# Patient Record
Sex: FEMALE | Race: WHITE | NOT HISPANIC OR LATINO | Employment: UNEMPLOYED | URBAN - METROPOLITAN AREA
[De-identification: names, ages, dates, MRNs, and addresses within clinical notes are randomized per-mention and may not be internally consistent; named-entity substitution may affect disease eponyms.]

---

## 2020-04-02 ENCOUNTER — OFFICE VISIT (OUTPATIENT)
Dept: URGENT CARE | Facility: CLINIC | Age: 60
End: 2020-04-02
Payer: COMMERCIAL

## 2020-04-02 VITALS
TEMPERATURE: 97 F | SYSTOLIC BLOOD PRESSURE: 144 MMHG | HEIGHT: 65 IN | BODY MASS INDEX: 46.65 KG/M2 | RESPIRATION RATE: 20 BRPM | HEART RATE: 92 BPM | DIASTOLIC BLOOD PRESSURE: 73 MMHG | WEIGHT: 280 LBS | OXYGEN SATURATION: 96 %

## 2020-04-02 DIAGNOSIS — R05.9 COUGH: Primary | ICD-10-CM

## 2020-04-02 DIAGNOSIS — J30.9 ALLERGIC RHINITIS WITH POSTNASAL DRIP: ICD-10-CM

## 2020-04-02 DIAGNOSIS — R09.82 ALLERGIC RHINITIS WITH POSTNASAL DRIP: ICD-10-CM

## 2020-04-02 DIAGNOSIS — J45.21 MILD INTERMITTENT ASTHMA WITH ACUTE EXACERBATION: ICD-10-CM

## 2020-04-02 PROCEDURE — 71046 XR CHEST PA AND LATERAL: ICD-10-PCS | Mod: S$GLB,,, | Performed by: RADIOLOGY

## 2020-04-02 PROCEDURE — 99203 OFFICE O/P NEW LOW 30 MIN: CPT | Mod: S$GLB,,, | Performed by: PHYSICIAN ASSISTANT

## 2020-04-02 PROCEDURE — 99203 PR OFFICE/OUTPT VISIT, NEW, LEVL III, 30-44 MIN: ICD-10-PCS | Mod: S$GLB,,, | Performed by: PHYSICIAN ASSISTANT

## 2020-04-02 PROCEDURE — 71046 X-RAY EXAM CHEST 2 VIEWS: CPT | Mod: S$GLB,,, | Performed by: RADIOLOGY

## 2020-04-02 RX ORDER — ALBUTEROL SULFATE 90 UG/1
AEROSOL, METERED RESPIRATORY (INHALATION)
COMMUNITY
End: 2021-12-15 | Stop reason: SDUPTHER

## 2020-04-02 RX ORDER — MONTELUKAST SODIUM 10 MG/1
10 TABLET ORAL NIGHTLY
Qty: 30 TABLET | Refills: 0 | Status: SHIPPED | OUTPATIENT
Start: 2020-04-02 | End: 2020-05-02

## 2020-04-02 RX ORDER — PREDNISONE 20 MG/1
TABLET ORAL
Qty: 6 TABLET | Refills: 0 | Status: SHIPPED | OUTPATIENT
Start: 2020-04-02 | End: 2020-04-06

## 2020-04-02 RX ORDER — ERGOCALCIFEROL 1.25 MG/1
CAPSULE ORAL
COMMUNITY
Start: 2019-12-10 | End: 2022-09-14 | Stop reason: SDUPTHER

## 2020-04-02 RX ORDER — BENZONATATE 200 MG/1
200 CAPSULE ORAL 3 TIMES DAILY PRN
Qty: 30 CAPSULE | Refills: 0 | Status: SHIPPED | OUTPATIENT
Start: 2020-04-02 | End: 2020-04-12

## 2020-04-02 RX ORDER — PROMETHAZINE HYDROCHLORIDE AND DEXTROMETHORPHAN HYDROBROMIDE 6.25; 15 MG/5ML; MG/5ML
5 SYRUP ORAL NIGHTLY PRN
Qty: 118 ML | Refills: 0 | Status: SHIPPED | OUTPATIENT
Start: 2020-04-02 | End: 2020-04-12

## 2020-04-02 NOTE — PATIENT INSTRUCTIONS
- Rest.    - Drink plenty of fluids.    - Tylenol or Ibuprofen as directed as needed for fever/pain. Avoid tylenol if you have a history of liver disease. Do not take ibuprofen if you have a history of GI bleeding, kidney disease, or if you take blood thinners.     - You can take over-the-counter claritin, zyrtec, allegra, or xyzal as directed. These are antihistamines that can help with runny nose, nasal congestion, sneezing, and helps to dry up post-nasal drip, which usually causes sore throat and cough.    - You can use Flonase (fluticasone) nasal spray as directed for sinus congestion and postnasal drip. This is a steroid nasal spray that works locally over time to decrease the inflammation in your nose/sinuses and help with allergic symptoms. This is not an quick- relief spray like afrin, but it works well if used daily.  Discontinue if you develop nose bleed  - use nasal saline prior to Flonase.    - Use Ocean Spray Nasal Saline 1-3 puffs each nostril every 2-3 hours then blow out onto tissue. This is to irrigate the nasal passage way to clear the sinus openings. Use until sinus problem resolved.    - you can take plain Mucinex (guaifenesin) 1200 mg twice a day to help loosen mucous    - Take the tessalon (benzonatate) as needed as prescribed for cough   - Only take the promethazine- DM as directed, as needed at night for cough. This medication may cause drowsiness.     - Montelukast/singulair is an allergy and asthma medication- use as directed.     -warm salt water gargles can help with sore throat    - warm tea with honey can help with cough. Honey is a natural cough suppressant.    - You received a steroid (prednisone) today.  This can elevate your blood pressure, elevate your blood sugar, water weight gain, nervous energy, redness to the face and dimpling of the skin where the shot goes in.   - Do not use steroids more than 3 times per year.   - If you have diabetes, please check you blood sugar  frequently.  - If you have high blood pressure, please check your blood pressure frequently.     - Follow up with your PCP or specialty clinic as directed in the next 1-2 weeks if not improved or as needed.  You can call [phone] to schedule an appointment with the appropriate provider.      - Go to the ER if you develop new or worsening symptoms.     - You must understand that you have received an Urgent Care treatment only and that you may be released before all of your medical problems are known or treated.   - You, the patient, will arrange for follow up care as instructed.   - If your condition worsens or fails to improve we recommend that you receive another evaluation at the ER immediately or contact your PCP to discuss your concerns or return here.       Understanding Nasal Allergies  Nasal allergies (also called allergic rhinitis) are a common health problem. They may be seasonal. This means they cause symptoms only at certain times of the year. Or they may be perennial. This means they cause symptoms all year long. Other health problems, such as asthma, often occur along with allergies as well.    What is an allergic reaction?  An allergy is a reaction to a substance called an allergen. Common allergens include:  · Wind-borne pollen  · Mold  · Dust mites  · Furry and feathered animals  · Cockroaches  Normally, allergens are harmless. But when a person has allergies, the body thinks they are harmful. The body then attacks allergens with antibodies. Antibodies are attached to special cells called mast cells. Allergens stick to the antibodies. This makes the mast cells release histamine and other chemicals. This is an allergic reaction. The chemicals irritate nearby nasal tissue. This causes nasal allergy symptoms.  Common nasal allergy symptoms  Allergies can cause nasal tissue to swell. This makes the air passages smaller. The nose may feel stuffed up. The nose may also make extra mucus, which can  plug the nasal passages or drip out of the nose. Mucus can drip down the back of the throat (postnasal drip) as well. Sinus tissue can swell. This may cause pain and headache. Common allergy symptoms include:  · Runny nose with clear, watery discharge  · Stuffy nose (nasal congestion)  · Drainage down your throat (postnasal drip)  · Sneezing  · Red, watery eyes  · Itchy nose, eyes, ears, and throat  · Plugged-up ears (ear congestion)  · Sore throat  · Coughing  · Sinus pain and swelling  · Headache  It may not be allergies  Other health problems can cause symptoms like those of nasal allergies. These include:  · Nonallergic rhinitis and viruses such as colds  · Irritants and pollutants, such as strong odors or smoke  · Certain medicines  · Changes in the weather   Treatment  Your healthcare provider will evaluate you to find the cause of your symptoms then recommend treatment. If your symptoms are due to nasal allergies, your healthcare provider may prescribe nasal steroid sprays or oral antihistamines to help reduce symptoms. Avoidance of the allergen will also be suggested. You may also be referred to an allergist.   Date Last Reviewed: 10/1/2016  © 8566-9215 [employer]. [address] [place] [address]. All rights reserved. This information is not intended as a substitute for professional medical care. Always follow your healthcare professional's instructions.        Asthma (Adult)  Asthma is a disease where the medium and  small air passages within the lung go into spasm and restrict the flow of air. Inflammation and swelling of the airways cause further restriction. During an acute asthma attack, these factors cause difficulty breathing, wheezing, cough and chest tightness.    An asthma attack can be triggered by many things. Common triggers include infections such as the common cold, bronchitis, pneumonia. Irritants such as smoke or pollutants in the air, emotional upset, and exercise  "can also trigger an attack. In many adults with asthma, allergies to dust, mold, pollen and animal dander can cause an asthma attack. Skipping doses of daily asthma medicine can also bring on an asthma attack.  Asthma can be controlled using the proper medicines prescribed by your healthcare provider and avoiding exposure to known triggers including allergens and irritants.  Home care  · Take prescribed medicine exactly at the times advised. If you need medicine such as from a hand held inhaler or aerosol breathing machine more than every 4 hours, contact your healthcare provider or seek immediate medical attention. If prescribed an antibiotic or prednisone, take all of the medicine as prescribed, even if you are feeling better after a few days.  · Do not smoke. Avoid being exposed to the smoke of others.  · Some people with asthma have worsening of their symptoms when they take aspirin and non-steroidal or fever-reducing medicines like ibuprofen and naproxen. Talk to your healthcare provider if you think this may apply to you.  Follow-up care  Follow up with your healthcare provider, or as advised. Always bring all of your current medicines to any appointments with your healthcare provider. Also bring a complete list of medications even those not taken for asthma. If you do not already have one, talk to your healthcare provider about developing a personalized "Asthma Action Plan."  A pneumococcal (pneumonia) vaccine and yearly flu shot (every fall) are recommended. Ask your doctor about this.  When to seek medical advice  Call your healthcare provider right away if any of these occur:   · Increased wheezing or shortness of breath  · Need to use your inhalers more often than usual without relief  · Fever of 100.4ºF (38ºC) or higher, or as directed by your healthcare provider  · Coughing up lots of dark-colored or bloody sputum (mucus)  · Chest pain with each breath  · If you use a peak flow meter as part of an " Asthma Action Plan, and you are still in the yellow zone (50% to 80%) 15 minutes after using inhaler medicine.  Call 911  Call 911 if any of the following occur  · Trouble walking or talking because of shortness of breath  · If you use a peak flow meter as part of an Asthma Action Plan and you are still in the red zone (less than 50%) 15 minutes after using inhaler medicine  · Lips or fingernails turning gray or blue  Date Last Reviewed: 12/2/2015  © 1111-9128 [employer]. [address] [place] [address], [place] [address]. All rights reserved. This information is not intended as a substitute for professional medical care. Always follow your healthcare professional's instructions.

## 2020-04-02 NOTE — PROGRESS NOTES
"Subjective:       Patient ID: Frances Cardoza is a 59 y.o. female.    Vitals:  height is 5' 5" (1.651 m) and weight is 127 kg (280 lb). Her oral temperature is 97.4 °F (36.3 °C). Her blood pressure is 144/73 (abnormal) and her pulse is 92. Her respiration is 20 and oxygen saturation is 96%.     Chief Complaint: Cough    59 year old female c/o cough, watery eyes, sneezing, post nasal drip, & scratchy throat that started 1 1/2 weeks ago. She has rhinorrhea and nasal congestion that will come and go. No sinus pain.  She denies chest pain or shortness of breath.  No fever.  She has history of Asthma, and has been using her albuterol regularly to help with coughing fits.. She claims the albuterol does temporary relief her symptoms.  Cough worsens at night time.  Cough is usually nonproductive, in the morning she does cough up some clear postnasal drip. She states that this happens every year around this time when the pollen comes out and triggers her seasonal allergies and asthma.    Cough   This is a new problem. The current episode started 1 to 4 weeks ago. The problem has been gradually worsening. The problem occurs every few minutes. The cough is productive of sputum. Associated symptoms include eye redness, postnasal drip, rhinorrhea and a sore throat (scratchy). Pertinent negatives include no chest pain, chills, ear congestion, ear pain, fever, headaches, heartburn, hemoptysis, myalgias, nasal congestion, rash, shortness of breath, sweats, weight loss or wheezing. The symptoms are aggravated by lying down. She has tried a beta-agonist inhaler for the symptoms. The treatment provided moderate relief. Her past medical history is significant for asthma, bronchitis and environmental allergies. There is no history of bronchiectasis, COPD, emphysema or pneumonia.       Constitution: Negative for chills, sweating, fatigue, fever and unexpected weight change.   HENT: Positive for postnasal drip and sore throat " (scratchy). Negative for ear pain.    Neck: Negative for neck pain, neck stiffness, painful lymph nodes and degenerative disc disease.   Cardiovascular: Negative for chest pain, leg swelling and sob on exertion.   Eyes: Positive for eye discharge (watering; no purulent discharge or crusting), eye itching and eye redness. Negative for eye pain and photophobia.   Respiratory: Positive for cough, sputum production and asthma. Negative for sleep apnea, chest tightness, bloody sputum, COPD, shortness of breath, stridor and wheezing.    Gastrointestinal: Negative for abdominal pain, abdominal bloating and heartburn.   Genitourinary: Negative for dysuria, frequency and urgency.   Musculoskeletal: Negative for muscle ache.   Skin: Negative for rash.   Allergic/Immunologic: Positive for environmental allergies, seasonal allergies and asthma.   Neurological: Negative for dizziness, light-headedness, passing out, speech difficulty, coordination disturbances, loss of balance, headaches, history of migraines, disorientation, altered mental status and loss of consciousness.   Hematologic/Lymphatic: Negative for swollen lymph nodes.   Psychiatric/Behavioral: Negative for altered mental status and disorientation.       Objective:      Physical Exam   Constitutional: She is oriented to person, place, and time. She appears well-developed and well-nourished. She is cooperative.  Non-toxic appearance. She does not have a sickly appearance. She does not appear ill. No distress.   HENT:   Head: Normocephalic and atraumatic.   Right Ear: Hearing, tympanic membrane, external ear and ear canal normal.   Left Ear: Hearing, tympanic membrane, external ear and ear canal normal.   Nose: No mucosal edema, rhinorrhea, purulent discharge or nasal deformity. No epistaxis. Right sinus exhibits no maxillary sinus tenderness and no frontal sinus tenderness. Left sinus exhibits no maxillary sinus tenderness and no frontal sinus tenderness.    Mouth/Throat: Uvula is midline, oropharynx is clear and moist and mucous membranes are normal. She does not have dentures. No trismus in the jaw. Normal dentition. No uvula swelling or dental caries. No oropharyngeal exudate, posterior oropharyngeal edema, posterior oropharyngeal erythema, tonsillar abscesses or cobblestoning. Tonsils are 0 on the right. Tonsils are 0 on the left. No tonsillar exudate.   Eyes: Pupils are equal, round, and reactive to light. Conjunctivae, EOM and lids are normal. Right eye exhibits no chemosis, no discharge and no exudate. Left eye exhibits no chemosis, no discharge and no exudate. No scleral icterus.   Neck: Trachea normal, full passive range of motion without pain and phonation normal. Neck supple. No neck rigidity. No edema and no erythema present.   Cardiovascular: Normal rate, regular rhythm, normal heart sounds, intact distal pulses and normal pulses.   Pulmonary/Chest: Effort normal and breath sounds normal. No accessory muscle usage or stridor. No tachypnea and no bradypnea. No respiratory distress. She has no decreased breath sounds. She has no wheezes. She has no rhonchi. She has no rales.   No respiratory distress; no wheeze   Abdominal: Normal appearance.   Musculoskeletal: Normal range of motion. She exhibits no edema or deformity.   Lymphadenopathy:        Head (right side): No submandibular, no preauricular and no posterior auricular adenopathy present.        Head (left side): No submandibular, no preauricular and no posterior auricular adenopathy present.     She has no cervical adenopathy.   Neurological: She is alert and oriented to person, place, and time. She exhibits normal muscle tone. Coordination normal.   Skin: Skin is warm, dry, intact, not diaphoretic and not pale.   Psychiatric: She has a normal mood and affect. Her speech is normal and behavior is normal. Judgment and thought content normal. Cognition and memory are normal.   Nursing note and vitals  reviewed.          Xr Chest Pa And Lateral    Result Date: 4/2/2020  EXAMINATION: XR CHEST PA AND LATERAL CLINICAL HISTORY: Cough TECHNIQUE: PA and lateral views of the chest were performed. COMPARISON: None FINDINGS: Cardiac silhouette and pulmonary vascularity are within normal range.  Lungs are symmetrically expanded without evidence of significant focal consolidation, large effusion or pneumothorax.  There is a retrocardiac opacity with air-fluid level likely a hiatal hernia.  Bones show degenerative changes.     No acute radiographic findings in the chest. Hiatal hernia. Electronically signed by: Aram Russell MD Date:    04/02/2020 Time:    13:19    Assessment:       1. Cough    2. Allergic rhinitis with postnasal drip    3. Mild intermittent asthma with acute exacerbation        Plan:         Cough  -     XR CHEST PA AND LATERAL; Future; Expected date: 04/02/2020  -     benzonatate (TESSALON) 200 MG capsule; Take 1 capsule (200 mg total) by mouth 3 (three) times daily as needed for Cough.  Dispense: 30 capsule; Refill: 0  -     promethazine-dextromethorphan (PROMETHAZINE-DM) 6.25-15 mg/5 mL Syrp; Take 5 mLs by mouth nightly as needed.  Dispense: 118 mL; Refill: 0    Allergic rhinitis with postnasal drip  -     montelukast (SINGULAIR) 10 mg tablet; Take 1 tablet (10 mg total) by mouth every evening.  Dispense: 30 tablet; Refill: 0    Mild intermittent asthma with acute exacerbation  -     predniSONE (DELTASONE) 20 MG tablet; Take 2 tablets (40 mg total) by mouth once daily for 2 days, THEN 1 tablet (20 mg total) once daily for 2 days.  Dispense: 6 tablet; Refill: 0  -     montelukast (SINGULAIR) 10 mg tablet; Take 1 tablet (10 mg total) by mouth every evening.  Dispense: 30 tablet; Refill: 0      Patient Instructions     - Rest.    - Drink plenty of fluids.    - Tylenol or Ibuprofen as directed as needed for fever/pain. Avoid tylenol if you have a history of liver disease. Do not take ibuprofen if you have a  history of GI bleeding, kidney disease, or if you take blood thinners.     - You can take over-the-counter claritin, zyrtec, allegra, or xyzal as directed. These are antihistamines that can help with runny nose, nasal congestion, sneezing, and helps to dry up post-nasal drip, which usually causes sore throat and cough.    - You can use Flonase (fluticasone) nasal spray as directed for sinus congestion and postnasal drip. This is a steroid nasal spray that works locally over time to decrease the inflammation in your nose/sinuses and help with allergic symptoms. This is not an quick- relief spray like afrin, but it works well if used daily.  Discontinue if you develop nose bleed  - use nasal saline prior to Flonase.    - Use Ocean Spray Nasal Saline 1-3 puffs each nostril every 2-3 hours then blow out onto tissue. This is to irrigate the nasal passage way to clear the sinus openings. Use until sinus problem resolved.    - you can take plain Mucinex (guaifenesin) 1200 mg twice a day to help loosen mucous    - Take the tessalon (benzonatate) as needed as prescribed for cough   - Only take the promethazine- DM as directed, as needed at night for cough. This medication may cause drowsiness.     - Montelukast/singulair is an allergy and asthma medication- use as directed.     -warm salt water gargles can help with sore throat    - warm tea with honey can help with cough. Honey is a natural cough suppressant.    - You received a steroid (prednisone) today.  This can elevate your blood pressure, elevate your blood sugar, water weight gain, nervous energy, redness to the face and dimpling of the skin where the shot goes in.   - Do not use steroids more than 3 times per year.   - If you have diabetes, please check you blood sugar frequently.  - If you have high blood pressure, please check your blood pressure frequently.     - Follow up with your PCP or specialty clinic as directed in the next 1-2 weeks if not improved or as  needed.  You can call [phone] to schedule an appointment with the appropriate provider.      - Go to the ER if you develop new or worsening symptoms.     - You must understand that you have received an Urgent Care treatment only and that you may be released before all of your medical problems are known or treated.   - You, the patient, will arrange for follow up care as instructed.   - If your condition worsens or fails to improve we recommend that you receive another evaluation at the ER immediately or contact your PCP to discuss your concerns or return here.       Understanding Nasal Allergies  Nasal allergies (also called allergic rhinitis) are a common health problem. They may be seasonal. This means they cause symptoms only at certain times of the year. Or they may be perennial. This means they cause symptoms all year long. Other health problems, such as asthma, often occur along with allergies as well.    What is an allergic reaction?  An allergy is a reaction to a substance called an allergen. Common allergens include:  · Wind-borne pollen  · Mold  · Dust mites  · Furry and feathered animals  · Cockroaches  Normally, allergens are harmless. But when a person has allergies, the body thinks they are harmful. The body then attacks allergens with antibodies. Antibodies are attached to special cells called mast cells. Allergens stick to the antibodies. This makes the mast cells release histamine and other chemicals. This is an allergic reaction. The chemicals irritate nearby nasal tissue. This causes nasal allergy symptoms.  Common nasal allergy symptoms  Allergies can cause nasal tissue to swell. This makes the air passages smaller. The nose may feel stuffed up. The nose may also make extra mucus, which can plug the nasal passages or drip out of the nose. Mucus can drip down the back of the throat (postnasal drip) as well. Sinus tissue can swell. This may cause pain and headache. Common allergy symptoms  include:  · Runny nose with clear, watery discharge  · Stuffy nose (nasal congestion)  · Drainage down your throat (postnasal drip)  · Sneezing  · Red, watery eyes  · Itchy nose, eyes, ears, and throat  · Plugged-up ears (ear congestion)  · Sore throat  · Coughing  · Sinus pain and swelling  · Headache  It may not be allergies  Other health problems can cause symptoms like those of nasal allergies. These include:  · Nonallergic rhinitis and viruses such as colds  · Irritants and pollutants, such as strong odors or smoke  · Certain medicines  · Changes in the weather   Treatment  Your healthcare provider will evaluate you to find the cause of your symptoms then recommend treatment. If your symptoms are due to nasal allergies, your healthcare provider may prescribe nasal steroid sprays or oral antihistamines to help reduce symptoms. Avoidance of the allergen will also be suggested. You may also be referred to an allergist.   Date Last Reviewed: 10/1/2016  © 2597-3256 [employer]. [address] [place] [address], [place] [address]. All rights reserved. This information is not intended as a substitute for professional medical care. Always follow your healthcare professional's instructions.        Asthma (Adult)  Asthma is a disease where the medium and  small air passages within the lung go into spasm and restrict the flow of air. Inflammation and swelling of the airways cause further restriction. During an acute asthma attack, these factors cause difficulty breathing, wheezing, cough and chest tightness.    An asthma attack can be triggered by many things. Common triggers include infections such as the common cold, bronchitis, pneumonia. Irritants such as smoke or pollutants in the air, emotional upset, and exercise can also trigger an attack. In many adults with asthma, allergies to dust, mold, pollen and animal dander can cause an asthma attack. Skipping doses of daily asthma medicine can also bring on an asthma  "attack.  Asthma can be controlled using the proper medicines prescribed by your healthcare provider and avoiding exposure to known triggers including allergens and irritants.  Home care  · Take prescribed medicine exactly at the times advised. If you need medicine such as from a hand held inhaler or aerosol breathing machine more than every 4 hours, contact your healthcare provider or seek immediate medical attention. If prescribed an antibiotic or prednisone, take all of the medicine as prescribed, even if you are feeling better after a few days.  · Do not smoke. Avoid being exposed to the smoke of others.  · Some people with asthma have worsening of their symptoms when they take aspirin and non-steroidal or fever-reducing medicines like ibuprofen and naproxen. Talk to your healthcare provider if you think this may apply to you.  Follow-up care  Follow up with your healthcare provider, or as advised. Always bring all of your current medicines to any appointments with your healthcare provider. Also bring a complete list of medications even those not taken for asthma. If you do not already have one, talk to your healthcare provider about developing a personalized "Asthma Action Plan."  A pneumococcal (pneumonia) vaccine and yearly flu shot (every fall) are recommended. Ask your doctor about this.  When to seek medical advice  Call your healthcare provider right away if any of these occur:   · Increased wheezing or shortness of breath  · Need to use your inhalers more often than usual without relief  · Fever of 100.4ºF (38ºC) or higher, or as directed by your healthcare provider  · Coughing up lots of dark-colored or bloody sputum (mucus)  · Chest pain with each breath  · If you use a peak flow meter as part of an Asthma Action Plan, and you are still in the yellow zone (50% to 80%) 15 minutes after using inhaler medicine.  Call 911  Call 911 if any of the following occur  · Trouble walking or talking because of " shortness of breath  · If you use a peak flow meter as part of an Asthma Action Plan and you are still in the red zone (less than 50%) 15 minutes after using inhaler medicine  · Lips or fingernails turning gray or blue  Date Last Reviewed: 12/2/2015  © 0728-2645 [employer]. [address] [place] [address], [place] [address]. All rights reserved. This information is not intended as a substitute for professional medical care. Always follow your healthcare professional's instructions.

## 2021-07-27 ENCOUNTER — OFFICE VISIT (OUTPATIENT)
Dept: URGENT CARE | Facility: CLINIC | Age: 61
End: 2021-07-27
Payer: COMMERCIAL

## 2021-07-27 VITALS
WEIGHT: 293 LBS | OXYGEN SATURATION: 98 % | BODY MASS INDEX: 48.82 KG/M2 | DIASTOLIC BLOOD PRESSURE: 71 MMHG | SYSTOLIC BLOOD PRESSURE: 122 MMHG | HEIGHT: 65 IN | HEART RATE: 91 BPM | TEMPERATURE: 98 F

## 2021-07-27 DIAGNOSIS — R09.81 CONGESTION OF PARANASAL SINUS: ICD-10-CM

## 2021-07-27 DIAGNOSIS — J00 ACUTE NASOPHARYNGITIS: Primary | ICD-10-CM

## 2021-07-27 DIAGNOSIS — Z11.52 ENCOUNTER FOR SCREENING FOR COVID-19: ICD-10-CM

## 2021-07-27 DIAGNOSIS — Z71.89 EDUCATED ABOUT COVID-19 VIRUS INFECTION: ICD-10-CM

## 2021-07-27 LAB
CTP QC/QA: YES
SARS-COV-2 RDRP RESP QL NAA+PROBE: NEGATIVE

## 2021-07-27 PROCEDURE — 1159F MED LIST DOCD IN RCRD: CPT | Mod: CPTII,S$GLB,, | Performed by: PHYSICIAN ASSISTANT

## 2021-07-27 PROCEDURE — 99214 PR OFFICE/OUTPT VISIT, EST, LEVL IV, 30-39 MIN: ICD-10-PCS | Mod: S$GLB,,, | Performed by: PHYSICIAN ASSISTANT

## 2021-07-27 PROCEDURE — U0002 COVID-19 LAB TEST NON-CDC: HCPCS | Mod: QW,S$GLB,, | Performed by: PHYSICIAN ASSISTANT

## 2021-07-27 PROCEDURE — U0002: ICD-10-PCS | Mod: QW,S$GLB,, | Performed by: PHYSICIAN ASSISTANT

## 2021-07-27 PROCEDURE — 1159F PR MEDICATION LIST DOCUMENTED IN MEDICAL RECORD: ICD-10-PCS | Mod: CPTII,S$GLB,, | Performed by: PHYSICIAN ASSISTANT

## 2021-07-27 PROCEDURE — 3008F BODY MASS INDEX DOCD: CPT | Mod: CPTII,S$GLB,, | Performed by: PHYSICIAN ASSISTANT

## 2021-07-27 PROCEDURE — 3008F PR BODY MASS INDEX (BMI) DOCUMENTED: ICD-10-PCS | Mod: CPTII,S$GLB,, | Performed by: PHYSICIAN ASSISTANT

## 2021-07-27 PROCEDURE — 99214 OFFICE O/P EST MOD 30 MIN: CPT | Mod: S$GLB,,, | Performed by: PHYSICIAN ASSISTANT

## 2021-07-27 RX ORDER — BENZONATATE 200 MG/1
CAPSULE ORAL
COMMUNITY
End: 2021-12-15

## 2021-07-27 RX ORDER — CIPROFLOXACIN 500 MG/1
TABLET ORAL
COMMUNITY
End: 2021-12-15

## 2021-07-27 RX ORDER — AMOXICILLIN AND CLAVULANATE POTASSIUM 875; 125 MG/1; MG/1
TABLET, FILM COATED ORAL
COMMUNITY
End: 2021-12-15

## 2021-07-27 RX ORDER — AZITHROMYCIN 250 MG/1
TABLET, FILM COATED ORAL
COMMUNITY
End: 2021-12-15

## 2021-07-27 RX ORDER — AMITRIPTYLINE HYDROCHLORIDE 25 MG/1
TABLET, FILM COATED ORAL
COMMUNITY
End: 2021-12-15

## 2021-07-27 RX ORDER — ASPIRIN 325 MG
TABLET, DELAYED RELEASE (ENTERIC COATED) ORAL
COMMUNITY
End: 2023-10-20

## 2021-07-27 RX ORDER — CELECOXIB 200 MG/1
CAPSULE ORAL
COMMUNITY
End: 2021-12-15

## 2021-07-27 RX ORDER — BACLOFEN 10 MG/1
TABLET ORAL
COMMUNITY
End: 2021-12-15

## 2021-07-27 RX ORDER — BETAMETHASONE DIPROPIONATE 0.5 MG/G
OINTMENT, AUGMENTED TOPICAL
COMMUNITY
End: 2021-12-15

## 2021-07-27 RX ORDER — CLOBETASOL PROPIONATE 0.5 MG/G
EMULSION TOPICAL
COMMUNITY
End: 2023-12-22

## 2021-07-27 RX ORDER — AMOXICILLIN 500 MG/1
CAPSULE ORAL
COMMUNITY
End: 2021-12-15

## 2021-07-27 RX ORDER — CLOBETASOL PROPIONATE 0.5 MG/G
OINTMENT TOPICAL
COMMUNITY
End: 2021-12-15

## 2021-12-15 ENCOUNTER — OFFICE VISIT (OUTPATIENT)
Dept: INTERNAL MEDICINE | Facility: CLINIC | Age: 61
End: 2021-12-15
Payer: COMMERCIAL

## 2021-12-15 VITALS
RESPIRATION RATE: 16 BRPM | DIASTOLIC BLOOD PRESSURE: 88 MMHG | HEIGHT: 65 IN | BODY MASS INDEX: 48.82 KG/M2 | OXYGEN SATURATION: 97 % | WEIGHT: 293 LBS | TEMPERATURE: 98 F | SYSTOLIC BLOOD PRESSURE: 134 MMHG | HEART RATE: 80 BPM

## 2021-12-15 DIAGNOSIS — I10 PRIMARY HYPERTENSION: ICD-10-CM

## 2021-12-15 DIAGNOSIS — J40 BRONCHITIS: Primary | ICD-10-CM

## 2021-12-15 DIAGNOSIS — R05.9 COUGH: ICD-10-CM

## 2021-12-15 DIAGNOSIS — J45.30 MILD PERSISTENT ASTHMA WITHOUT COMPLICATION: ICD-10-CM

## 2021-12-15 DIAGNOSIS — J01.00 ACUTE NON-RECURRENT MAXILLARY SINUSITIS: ICD-10-CM

## 2021-12-15 PROCEDURE — 99214 OFFICE O/P EST MOD 30 MIN: CPT | Mod: 25,S$GLB,, | Performed by: NURSE PRACTITIONER

## 2021-12-15 PROCEDURE — 99999 PR PBB SHADOW E&M-EST. PATIENT-LVL III: CPT | Mod: PBBFAC,,, | Performed by: NURSE PRACTITIONER

## 2021-12-15 PROCEDURE — 99214 PR OFFICE/OUTPT VISIT, EST, LEVL IV, 30-39 MIN: ICD-10-PCS | Mod: 25,S$GLB,, | Performed by: NURSE PRACTITIONER

## 2021-12-15 PROCEDURE — 96372 PR INJECTION,THERAP/PROPH/DIAG2ST, IM OR SUBCUT: ICD-10-PCS | Mod: S$GLB,,, | Performed by: NURSE PRACTITIONER

## 2021-12-15 PROCEDURE — 99999 PR PBB SHADOW E&M-EST. PATIENT-LVL III: ICD-10-PCS | Mod: PBBFAC,,, | Performed by: NURSE PRACTITIONER

## 2021-12-15 PROCEDURE — 96372 THER/PROPH/DIAG INJ SC/IM: CPT | Mod: S$GLB,,, | Performed by: NURSE PRACTITIONER

## 2021-12-15 RX ORDER — BENZONATATE 200 MG/1
200 CAPSULE ORAL 3 TIMES DAILY PRN
Qty: 30 CAPSULE | Refills: 0 | Status: SHIPPED | OUTPATIENT
Start: 2021-12-15 | End: 2021-12-25

## 2021-12-15 RX ORDER — PANTOPRAZOLE SODIUM 40 MG/1
40 TABLET, DELAYED RELEASE ORAL DAILY
COMMUNITY
Start: 2021-10-21 | End: 2022-01-13 | Stop reason: SDUPTHER

## 2021-12-15 RX ORDER — AMOXICILLIN AND CLAVULANATE POTASSIUM 875; 125 MG/1; MG/1
1 TABLET, FILM COATED ORAL EVERY 12 HOURS
Qty: 14 TABLET | Refills: 0 | Status: SHIPPED | OUTPATIENT
Start: 2021-12-15 | End: 2021-12-22

## 2021-12-15 RX ORDER — ROSUVASTATIN CALCIUM 40 MG/1
40 TABLET, COATED ORAL DAILY
COMMUNITY
Start: 2021-10-19 | End: 2022-09-14 | Stop reason: SDUPTHER

## 2021-12-15 RX ORDER — NEBIVOLOL HYDROCHLORIDE 5 MG/1
1 TABLET ORAL 2 TIMES DAILY
COMMUNITY
Start: 2021-09-23 | End: 2022-09-14 | Stop reason: SDUPTHER

## 2021-12-15 RX ORDER — ALBUTEROL SULFATE 90 UG/1
2 AEROSOL, METERED RESPIRATORY (INHALATION) EVERY 6 HOURS PRN
Qty: 18 G | Refills: 6 | Status: SHIPPED | OUTPATIENT
Start: 2021-12-15 | End: 2022-09-14 | Stop reason: SDUPTHER

## 2021-12-15 RX ORDER — METFORMIN HYDROCHLORIDE 500 MG/1
500 TABLET, EXTENDED RELEASE ORAL DAILY
COMMUNITY
Start: 2021-10-27 | End: 2022-01-20 | Stop reason: SDUPTHER

## 2021-12-15 RX ORDER — TRIAMCINOLONE ACETONIDE 40 MG/ML
40 INJECTION, SUSPENSION INTRA-ARTICULAR; INTRAMUSCULAR ONCE
Status: COMPLETED | OUTPATIENT
Start: 2021-12-15 | End: 2021-12-15

## 2021-12-15 RX ADMIN — TRIAMCINOLONE ACETONIDE 40 MG: 40 INJECTION, SUSPENSION INTRA-ARTICULAR; INTRAMUSCULAR at 08:12

## 2022-01-13 ENCOUNTER — PATIENT MESSAGE (OUTPATIENT)
Dept: INTERNAL MEDICINE | Facility: CLINIC | Age: 62
End: 2022-01-13

## 2022-01-13 ENCOUNTER — OFFICE VISIT (OUTPATIENT)
Dept: INTERNAL MEDICINE | Facility: CLINIC | Age: 62
End: 2022-01-13
Payer: COMMERCIAL

## 2022-01-13 ENCOUNTER — HOSPITAL ENCOUNTER (OUTPATIENT)
Dept: RADIOLOGY | Facility: HOSPITAL | Age: 62
Discharge: HOME | End: 2022-01-13
Attending: INTERNAL MEDICINE
Payer: COMMERCIAL

## 2022-01-13 VITALS
HEART RATE: 72 BPM | OXYGEN SATURATION: 99 % | RESPIRATION RATE: 16 BRPM | DIASTOLIC BLOOD PRESSURE: 94 MMHG | BODY MASS INDEX: 48.82 KG/M2 | HEIGHT: 65 IN | WEIGHT: 293 LBS | SYSTOLIC BLOOD PRESSURE: 130 MMHG

## 2022-01-13 DIAGNOSIS — J40 BRONCHITIS: Primary | ICD-10-CM

## 2022-01-13 DIAGNOSIS — J40 BRONCHITIS: ICD-10-CM

## 2022-01-13 PROCEDURE — 3075F SYST BP GE 130 - 139MM HG: CPT | Mod: CPTII,S$GLB,, | Performed by: INTERNAL MEDICINE

## 2022-01-13 PROCEDURE — 3080F PR MOST RECENT DIASTOLIC BLOOD PRESSURE >= 90 MM HG: ICD-10-PCS | Mod: CPTII,S$GLB,, | Performed by: INTERNAL MEDICINE

## 2022-01-13 PROCEDURE — 99999 PR PBB SHADOW E&M-EST. PATIENT-LVL III: ICD-10-PCS | Mod: PBBFAC,,, | Performed by: INTERNAL MEDICINE

## 2022-01-13 PROCEDURE — 1159F PR MEDICATION LIST DOCUMENTED IN MEDICAL RECORD: ICD-10-PCS | Mod: CPTII,S$GLB,, | Performed by: INTERNAL MEDICINE

## 2022-01-13 PROCEDURE — 99999 PR PBB SHADOW E&M-EST. PATIENT-LVL III: CPT | Mod: PBBFAC,,, | Performed by: INTERNAL MEDICINE

## 2022-01-13 PROCEDURE — 71046 XR CHEST PA AND LATERAL: ICD-10-PCS | Mod: 26,,, | Performed by: RADIOLOGY

## 2022-01-13 PROCEDURE — 71046 X-RAY EXAM CHEST 2 VIEWS: CPT | Mod: TC

## 2022-01-13 PROCEDURE — 3080F DIAST BP >= 90 MM HG: CPT | Mod: CPTII,S$GLB,, | Performed by: INTERNAL MEDICINE

## 2022-01-13 PROCEDURE — 1159F MED LIST DOCD IN RCRD: CPT | Mod: CPTII,S$GLB,, | Performed by: INTERNAL MEDICINE

## 2022-01-13 PROCEDURE — 99214 PR OFFICE/OUTPT VISIT, EST, LEVL IV, 30-39 MIN: ICD-10-PCS | Mod: S$GLB,,, | Performed by: INTERNAL MEDICINE

## 2022-01-13 PROCEDURE — 3008F BODY MASS INDEX DOCD: CPT | Mod: CPTII,S$GLB,, | Performed by: INTERNAL MEDICINE

## 2022-01-13 PROCEDURE — 3008F PR BODY MASS INDEX (BMI) DOCUMENTED: ICD-10-PCS | Mod: CPTII,S$GLB,, | Performed by: INTERNAL MEDICINE

## 2022-01-13 PROCEDURE — 3075F PR MOST RECENT SYSTOLIC BLOOD PRESS GE 130-139MM HG: ICD-10-PCS | Mod: CPTII,S$GLB,, | Performed by: INTERNAL MEDICINE

## 2022-01-13 PROCEDURE — 71046 X-RAY EXAM CHEST 2 VIEWS: CPT | Mod: 26,,, | Performed by: RADIOLOGY

## 2022-01-13 PROCEDURE — 99214 OFFICE O/P EST MOD 30 MIN: CPT | Mod: S$GLB,,, | Performed by: INTERNAL MEDICINE

## 2022-01-13 RX ORDER — CEFDINIR 300 MG/1
300 CAPSULE ORAL 2 TIMES DAILY
Qty: 20 CAPSULE | Refills: 0 | Status: SHIPPED | OUTPATIENT
Start: 2022-01-13 | End: 2022-01-23

## 2022-01-13 RX ORDER — LEVOTHYROXINE SODIUM 50 UG/1
50 TABLET ORAL DAILY
Qty: 90 TABLET | Refills: 0 | Status: SHIPPED | OUTPATIENT
Start: 2022-01-13 | End: 2022-07-12 | Stop reason: SDUPTHER

## 2022-01-13 RX ORDER — DULOXETIN HYDROCHLORIDE 30 MG/1
30 CAPSULE, DELAYED RELEASE ORAL DAILY
COMMUNITY
Start: 2021-12-29 | End: 2022-01-13 | Stop reason: SDUPTHER

## 2022-01-13 RX ORDER — PANTOPRAZOLE SODIUM 40 MG/1
40 TABLET, DELAYED RELEASE ORAL DAILY
Qty: 90 TABLET | Refills: 0 | Status: SHIPPED | OUTPATIENT
Start: 2022-01-13 | End: 2022-07-12 | Stop reason: SDUPTHER

## 2022-01-13 RX ORDER — DULOXETIN HYDROCHLORIDE 30 MG/1
30 CAPSULE, DELAYED RELEASE ORAL DAILY
Qty: 90 CAPSULE | Refills: 0 | Status: SHIPPED | OUTPATIENT
Start: 2022-01-13 | End: 2022-09-14

## 2022-01-13 NOTE — PROGRESS NOTES
[facility] Note    Chief Complaint      Chief Complaint   Patient presents with    Chest Congestion    Asthma     History of Present Illness      Frances Cardoza is a 61 y.o. female who presents today for chief complaint cough and congestion x3 weeks  HPI   covid pos 12.24, feeling poorly, productive cough, not wheezing, not short of breath, taking OTC theraflu and mucinex   Was tx with augmentin and kenalog on 12.15 NP Any     There are no problems to display for this patient.      Health Maintenance   Topic Date Due    Hepatitis C Screening  Never done    TETANUS VACCINE  Never done    Mammogram  Never done    Lipid Panel  07/24/2026       Past Medical History:   Diagnosis Date    GERD (gastroesophageal reflux disease)     High cholesterol     Hypertension     Plantar fasciitis     Thyroid disease        Past Surgical History:   Procedure Laterality Date    HERNIA REPAIR      THYROID SURGERY      TONSILLECTOMY      uterine ablation         family history includes Cancer in her mother; Hypertension in her father; Stroke in her father.    Social History     Tobacco Use    Smoking status: Never Smoker    Smokeless tobacco: Never Used   Substance Use Topics    Alcohol use: Yes     Comment: occ    Drug use: No       Review of Systems   Constitutional: Positive for malaise/fatigue. Negative for chills, fever and weight loss.   HENT: Positive for congestion and sore throat. Negative for sinus pain.    Respiratory: Positive for cough and sputum production. Negative for shortness of breath and wheezing.         Outpatient Encounter Medications as of 1/13/2022   Medication Sig Dispense Refill    albuterol (VENTOLIN HFA) 90 mcg/actuation inhaler Inhale 2 puffs into the lungs every 6 (six) hours as needed for Wheezing. Rescue 18 g 6    BYSTOLIC 5 mg Tab Take 1 tablet by mouth 2 (two) times a day.      cholecalciferol, vitamin D3, 1,250 mcg (50,000 unit)  "capsule Decara 1,250 mcg (50,000 unit) capsule   TK ONE C PO  ONCE A MONTH      ergocalciferol (ERGOCALCIFEROL) 50,000 unit Cap ergocalciferol (vitamin D2) 1,250 mcg (50,000 unit) capsule      metFORMIN (GLUCOPHAGE-XR) 500 MG ER 24hr tablet Take 500 mg by mouth once daily.      rosuvastatin (CRESTOR) 40 MG Tab Take 40 mg by mouth once daily.      [DISCONTINUED] DULoxetine (CYMBALTA) 30 MG capsule Take 30 mg by mouth once daily.      [DISCONTINUED] levothyroxine (SYNTHROID) 50 MCG tablet Take 50 mcg by mouth once daily.      [DISCONTINUED] pantoprazole (PROTONIX) 40 MG tablet Take 40 mg by mouth once daily.      cefdinir (OMNICEF) 300 MG capsule Take 1 capsule (300 mg total) by mouth 2 (two) times daily. for 10 days 20 capsule 0    clobetasoL-emollient 0.05 % Crea clobetasol-emollient 0.05 % topical cream   SABINO AA BID      DULoxetine (CYMBALTA) 30 MG capsule Take 1 capsule (30 mg total) by mouth once daily. 90 capsule 0    levothyroxine (SYNTHROID) 50 MCG tablet Take 1 tablet (50 mcg total) by mouth once daily. 90 tablet 0    pantoprazole (PROTONIX) 40 MG tablet Take 1 tablet (40 mg total) by mouth once daily. 90 tablet 0     No facility-administered encounter medications on file as of 1/13/2022.        Review of patient's allergies indicates:   Allergen Reactions    Coly-mycin m [colistimethate sodium]     Latex, natural rubber Rash           Physical Exam      Vital Signs  Pulse: 72  Resp: 16  SpO2: 99 %  BP: (!) 130/94  BP Location: Left arm  Patient Position: Sitting  Height and Weight  Height: 5' 5" (165.1 cm)  Weight: 134.4 kg (296 lb 3 oz)  BSA (Calculated - sq m): 2.48 sq meters  BMI (Calculated): 49.3  Weight in (lb) to have BMI = 25: 149.9]    Physical Exam  Vitals reviewed.   Constitutional:       General: She is not in acute distress.     Appearance: She is well-developed and well-nourished. She is not diaphoretic.   HENT:      Head: Normocephalic and atraumatic.      Right Ear: External ear " normal.      Left Ear: External ear normal.      Nose: Nose normal.   Eyes:      General:         Right eye: No discharge.         Left eye: No discharge.      Extraocular Movements: EOM normal.      Conjunctiva/sclera: Conjunctivae normal.   Cardiovascular:      Rate and Rhythm: Normal rate and regular rhythm.      Heart sounds: Normal heart sounds.   Pulmonary:      Effort: Pulmonary effort is normal. No respiratory distress.      Breath sounds: Normal breath sounds. No wheezing or rales.   Musculoskeletal:         General: Normal range of motion.      Cervical back: Normal range of motion.   Skin:     Coloration: Skin is not pale.      Findings: No rash.   Neurological:      Mental Status: She is alert and oriented to person, place, and time.   Psychiatric:         Mood and Affect: Mood and affect normal.         Behavior: Behavior normal.         Thought Content: Thought content normal.         Judgment: Judgment normal.            Assessment/Plan     Frances Cardoza is a 61 y.o.female with:    Bronchitis  Omnicef, antihistamine nasal steroid, decongestant as tolerated and expectorant otc  cxr today     Orders Placed This Encounter   Procedures    X-Ray Chest PA And Lateral     Standing Status:   Future     Standing Expiration Date:   1/13/2023     Order Specific Question:   May the Radiologist modify the order per protocol to meet the clinical needs of the patient?     Answer:   Yes       Use of the [facility] Patient Portal discussed and encouraged during today's visit  -Continue current medications and maintain follow up with specialists.  Return to clinic in prn months.  No future appointments.    Katherine Scott MD  1/13/2022 8:24 AM    Primary Care Internal Medicine - [facility]

## 2022-01-20 ENCOUNTER — PATIENT MESSAGE (OUTPATIENT)
Dept: INTERNAL MEDICINE | Facility: CLINIC | Age: 62
End: 2022-01-20
Payer: COMMERCIAL

## 2022-01-20 RX ORDER — METFORMIN HYDROCHLORIDE 500 MG/1
500 TABLET, EXTENDED RELEASE ORAL DAILY
Qty: 90 TABLET | Refills: 1 | Status: SHIPPED | OUTPATIENT
Start: 2022-01-20 | End: 2022-07-01

## 2022-03-16 ENCOUNTER — PATIENT MESSAGE (OUTPATIENT)
Dept: INTERNAL MEDICINE | Facility: CLINIC | Age: 62
End: 2022-03-16
Payer: COMMERCIAL

## 2022-03-16 DIAGNOSIS — R73.03 PRE-DIABETES: Primary | ICD-10-CM

## 2022-03-16 DIAGNOSIS — I10 PRIMARY HYPERTENSION: ICD-10-CM

## 2022-03-18 ENCOUNTER — PATIENT MESSAGE (OUTPATIENT)
Dept: INTERNAL MEDICINE | Facility: CLINIC | Age: 62
End: 2022-03-18
Payer: COMMERCIAL

## 2022-03-22 ENCOUNTER — LAB VISIT (OUTPATIENT)
Dept: LAB | Facility: HOSPITAL | Age: 62
End: 2022-03-22
Attending: NURSE PRACTITIONER
Payer: COMMERCIAL

## 2022-03-22 DIAGNOSIS — R73.03 PRE-DIABETES: ICD-10-CM

## 2022-03-22 DIAGNOSIS — I10 PRIMARY HYPERTENSION: ICD-10-CM

## 2022-03-22 LAB
ALBUMIN SERPL BCP-MCNC: 3.4 G/DL (ref 3.5–5.2)
ALP SERPL-CCNC: 81 U/L (ref 55–135)
ALT SERPL W/O P-5'-P-CCNC: 24 U/L (ref 10–44)
ANION GAP SERPL CALC-SCNC: 8 MMOL/L (ref 8–16)
AST SERPL-CCNC: 17 U/L (ref 10–40)
BILIRUB SERPL-MCNC: 0.7 MG/DL (ref 0.1–1)
BUN SERPL-MCNC: 16 MG/DL (ref 6–20)
CALCIUM SERPL-MCNC: 10 MG/DL (ref 8.7–10.5)
CHLORIDE SERPL-SCNC: 103 MMOL/L (ref 95–110)
CO2 SERPL-SCNC: 29 MMOL/L (ref 23–29)
CREAT SERPL-MCNC: 0.7 MG/DL (ref 0.5–1.4)
EST. GFR  (AFRICAN AMERICAN): >60 ML/MIN/1.73 M^2
EST. GFR  (NON AFRICAN AMERICAN): >60 ML/MIN/1.73 M^2
ESTIMATED AVG GLUCOSE: 114 MG/DL (ref 68–131)
GLUCOSE SERPL-MCNC: 110 MG/DL (ref 70–110)
HBA1C MFR BLD: 5.6 % (ref 4–5.6)
POTASSIUM SERPL-SCNC: 4.4 MMOL/L (ref 3.5–5.1)
PROT SERPL-MCNC: 7.2 G/DL (ref 6–8.4)
SODIUM SERPL-SCNC: 140 MMOL/L (ref 136–145)

## 2022-03-22 PROCEDURE — 36415 COLL VENOUS BLD VENIPUNCTURE: CPT | Performed by: NURSE PRACTITIONER

## 2022-03-22 PROCEDURE — 80053 COMPREHEN METABOLIC PANEL: CPT | Performed by: NURSE PRACTITIONER

## 2022-03-22 PROCEDURE — 83036 HEMOGLOBIN GLYCOSYLATED A1C: CPT | Performed by: NURSE PRACTITIONER

## 2022-07-12 ENCOUNTER — PATIENT MESSAGE (OUTPATIENT)
Dept: INTERNAL MEDICINE | Facility: CLINIC | Age: 62
End: 2022-07-12
Payer: COMMERCIAL

## 2022-07-12 RX ORDER — PANTOPRAZOLE SODIUM 40 MG/1
40 TABLET, DELAYED RELEASE ORAL DAILY
Qty: 90 TABLET | Refills: 0 | Status: SHIPPED | OUTPATIENT
Start: 2022-07-12 | End: 2022-09-14 | Stop reason: SDUPTHER

## 2022-07-12 RX ORDER — DULOXETIN HYDROCHLORIDE 30 MG/1
30 CAPSULE, DELAYED RELEASE ORAL DAILY
Qty: 90 CAPSULE | Refills: 0 | OUTPATIENT
Start: 2022-07-12

## 2022-07-12 RX ORDER — LEVOTHYROXINE SODIUM 50 UG/1
50 TABLET ORAL DAILY
Qty: 90 TABLET | Refills: 0 | Status: SHIPPED | OUTPATIENT
Start: 2022-07-12 | End: 2022-09-14 | Stop reason: SDUPTHER

## 2022-07-12 RX ORDER — PANTOPRAZOLE SODIUM 40 MG/1
40 TABLET, DELAYED RELEASE ORAL DAILY
Qty: 90 TABLET | Refills: 0 | OUTPATIENT
Start: 2022-07-12

## 2022-07-12 RX ORDER — LEVOTHYROXINE SODIUM 50 UG/1
50 TABLET ORAL DAILY
Qty: 90 TABLET | Refills: 0 | OUTPATIENT
Start: 2022-07-12

## 2022-09-14 ENCOUNTER — LAB VISIT (OUTPATIENT)
Dept: LAB | Facility: HOSPITAL | Age: 62
End: 2022-09-14
Attending: INTERNAL MEDICINE
Payer: COMMERCIAL

## 2022-09-14 ENCOUNTER — TELEPHONE (OUTPATIENT)
Dept: HEMATOLOGY/ONCOLOGY | Facility: CLINIC | Age: 62
End: 2022-09-14
Payer: COMMERCIAL

## 2022-09-14 ENCOUNTER — PATIENT MESSAGE (OUTPATIENT)
Dept: ADMINISTRATIVE | Facility: HOSPITAL | Age: 62
End: 2022-09-14
Payer: COMMERCIAL

## 2022-09-14 ENCOUNTER — OFFICE VISIT (OUTPATIENT)
Dept: INTERNAL MEDICINE | Facility: CLINIC | Age: 62
End: 2022-09-14
Payer: COMMERCIAL

## 2022-09-14 VITALS
DIASTOLIC BLOOD PRESSURE: 88 MMHG | OXYGEN SATURATION: 98 % | WEIGHT: 293 LBS | HEIGHT: 65 IN | TEMPERATURE: 98 F | SYSTOLIC BLOOD PRESSURE: 128 MMHG | HEART RATE: 90 BPM | BODY MASS INDEX: 48.82 KG/M2

## 2022-09-14 DIAGNOSIS — I65.29 CAROTID ATHEROSCLEROSIS, UNSPECIFIED LATERALITY: ICD-10-CM

## 2022-09-14 DIAGNOSIS — Z11.59 ENCOUNTER FOR HEPATITIS C SCREENING TEST FOR LOW RISK PATIENT: ICD-10-CM

## 2022-09-14 DIAGNOSIS — R73.03 PREDIABETES: ICD-10-CM

## 2022-09-14 DIAGNOSIS — Z12.11 SCREENING FOR COLON CANCER: ICD-10-CM

## 2022-09-14 DIAGNOSIS — Z80.0 FAMILY HISTORY OF COLON CANCER: ICD-10-CM

## 2022-09-14 DIAGNOSIS — Z80.3 FAMILY HISTORY OF BREAST CANCER: ICD-10-CM

## 2022-09-14 DIAGNOSIS — M79.7 FIBROMYALGIA: ICD-10-CM

## 2022-09-14 DIAGNOSIS — E55.9 VITAMIN D DEFICIENCY: ICD-10-CM

## 2022-09-14 DIAGNOSIS — J01.00 ACUTE NON-RECURRENT MAXILLARY SINUSITIS: ICD-10-CM

## 2022-09-14 DIAGNOSIS — I10 HYPERTENSION, BENIGN: ICD-10-CM

## 2022-09-14 DIAGNOSIS — K21.9 GASTROESOPHAGEAL REFLUX DISEASE, UNSPECIFIED WHETHER ESOPHAGITIS PRESENT: ICD-10-CM

## 2022-09-14 DIAGNOSIS — Z11.59 ENCOUNTER FOR HEPATITIS C SCREENING TEST FOR LOW RISK PATIENT: Primary | ICD-10-CM

## 2022-09-14 DIAGNOSIS — J45.30 MILD PERSISTENT ASTHMA WITHOUT COMPLICATION: ICD-10-CM

## 2022-09-14 DIAGNOSIS — I25.10 CORONARY ARTERY DISEASE INVOLVING NATIVE CORONARY ARTERY OF NATIVE HEART WITHOUT ANGINA PECTORIS: ICD-10-CM

## 2022-09-14 DIAGNOSIS — E89.0 POST-SURGICAL HYPOTHYROIDISM: ICD-10-CM

## 2022-09-14 DIAGNOSIS — E78.2 MIXED HYPERLIPIDEMIA: ICD-10-CM

## 2022-09-14 DIAGNOSIS — Z84.81 FAMILY HISTORY OF BRCA GENE MUTATION: ICD-10-CM

## 2022-09-14 DIAGNOSIS — J40 BRONCHITIS: ICD-10-CM

## 2022-09-14 LAB — HCV AB SERPL QL IA: NORMAL

## 2022-09-14 PROCEDURE — 1159F PR MEDICATION LIST DOCUMENTED IN MEDICAL RECORD: ICD-10-PCS | Mod: CPTII,S$GLB,, | Performed by: INTERNAL MEDICINE

## 2022-09-14 PROCEDURE — 3074F SYST BP LT 130 MM HG: CPT | Mod: CPTII,S$GLB,, | Performed by: INTERNAL MEDICINE

## 2022-09-14 PROCEDURE — 3074F PR MOST RECENT SYSTOLIC BLOOD PRESSURE < 130 MM HG: ICD-10-PCS | Mod: CPTII,S$GLB,, | Performed by: INTERNAL MEDICINE

## 2022-09-14 PROCEDURE — 3044F PR MOST RECENT HEMOGLOBIN A1C LEVEL <7.0%: ICD-10-PCS | Mod: CPTII,S$GLB,, | Performed by: INTERNAL MEDICINE

## 2022-09-14 PROCEDURE — 99999 PR PBB SHADOW E&M-EST. PATIENT-LVL V: ICD-10-PCS | Mod: PBBFAC,,, | Performed by: INTERNAL MEDICINE

## 2022-09-14 PROCEDURE — 3079F DIAST BP 80-89 MM HG: CPT | Mod: CPTII,S$GLB,, | Performed by: INTERNAL MEDICINE

## 2022-09-14 PROCEDURE — 36415 COLL VENOUS BLD VENIPUNCTURE: CPT | Performed by: INTERNAL MEDICINE

## 2022-09-14 PROCEDURE — 1159F MED LIST DOCD IN RCRD: CPT | Mod: CPTII,S$GLB,, | Performed by: INTERNAL MEDICINE

## 2022-09-14 PROCEDURE — 3044F HG A1C LEVEL LT 7.0%: CPT | Mod: CPTII,S$GLB,, | Performed by: INTERNAL MEDICINE

## 2022-09-14 PROCEDURE — 3079F PR MOST RECENT DIASTOLIC BLOOD PRESSURE 80-89 MM HG: ICD-10-PCS | Mod: CPTII,S$GLB,, | Performed by: INTERNAL MEDICINE

## 2022-09-14 PROCEDURE — 99214 PR OFFICE/OUTPT VISIT, EST, LEVL IV, 30-39 MIN: ICD-10-PCS | Mod: S$GLB,,, | Performed by: INTERNAL MEDICINE

## 2022-09-14 PROCEDURE — 3008F BODY MASS INDEX DOCD: CPT | Mod: CPTII,S$GLB,, | Performed by: INTERNAL MEDICINE

## 2022-09-14 PROCEDURE — 99214 OFFICE O/P EST MOD 30 MIN: CPT | Mod: S$GLB,,, | Performed by: INTERNAL MEDICINE

## 2022-09-14 PROCEDURE — 1160F RVW MEDS BY RX/DR IN RCRD: CPT | Mod: CPTII,S$GLB,, | Performed by: INTERNAL MEDICINE

## 2022-09-14 PROCEDURE — 1160F PR REVIEW ALL MEDS BY PRESCRIBER/CLIN PHARMACIST DOCUMENTED: ICD-10-PCS | Mod: CPTII,S$GLB,, | Performed by: INTERNAL MEDICINE

## 2022-09-14 PROCEDURE — 99999 PR PBB SHADOW E&M-EST. PATIENT-LVL V: CPT | Mod: PBBFAC,,, | Performed by: INTERNAL MEDICINE

## 2022-09-14 PROCEDURE — 3008F PR BODY MASS INDEX (BMI) DOCUMENTED: ICD-10-PCS | Mod: CPTII,S$GLB,, | Performed by: INTERNAL MEDICINE

## 2022-09-14 PROCEDURE — 86803 HEPATITIS C AB TEST: CPT | Performed by: INTERNAL MEDICINE

## 2022-09-14 RX ORDER — CLOPIDOGREL BISULFATE 75 MG/1
75 TABLET ORAL DAILY
COMMUNITY
Start: 2022-07-06 | End: 2022-09-14 | Stop reason: SDUPTHER

## 2022-09-14 RX ORDER — MONTELUKAST SODIUM 10 MG/1
10 TABLET ORAL NIGHTLY
Qty: 90 TABLET | Refills: 1 | Status: SHIPPED | OUTPATIENT
Start: 2022-09-14 | End: 2023-03-14

## 2022-09-14 RX ORDER — ALBUTEROL SULFATE 90 UG/1
2 AEROSOL, METERED RESPIRATORY (INHALATION) EVERY 6 HOURS PRN
Qty: 18 G | Refills: 6 | Status: SHIPPED | OUTPATIENT
Start: 2022-09-14 | End: 2023-10-20 | Stop reason: SDUPTHER

## 2022-09-14 RX ORDER — DULOXETIN HYDROCHLORIDE 30 MG/1
30 CAPSULE, DELAYED RELEASE ORAL DAILY
Qty: 90 CAPSULE | Refills: 1 | Status: SHIPPED | OUTPATIENT
Start: 2022-09-14 | End: 2022-11-10

## 2022-09-14 RX ORDER — MUPIROCIN 20 MG/G
1 OINTMENT TOPICAL 2 TIMES DAILY
COMMUNITY
Start: 2022-08-30 | End: 2023-12-22

## 2022-09-14 RX ORDER — DULOXETIN HYDROCHLORIDE 30 MG/1
30 CAPSULE, DELAYED RELEASE ORAL DAILY
COMMUNITY
End: 2022-09-14 | Stop reason: SDUPTHER

## 2022-09-14 RX ORDER — ROSUVASTATIN CALCIUM 40 MG/1
40 TABLET, COATED ORAL DAILY
Qty: 90 TABLET | Refills: 1 | Status: SHIPPED | OUTPATIENT
Start: 2022-09-14 | End: 2023-03-28

## 2022-09-14 RX ORDER — METFORMIN HYDROCHLORIDE 500 MG/1
500 TABLET, EXTENDED RELEASE ORAL DAILY
Qty: 90 TABLET | Refills: 1 | Status: SHIPPED | OUTPATIENT
Start: 2022-09-14 | End: 2023-03-28

## 2022-09-14 RX ORDER — CLOPIDOGREL BISULFATE 75 MG/1
75 TABLET ORAL DAILY
Qty: 90 TABLET | Refills: 1 | Status: SHIPPED | OUTPATIENT
Start: 2022-09-14 | End: 2022-11-10

## 2022-09-14 RX ORDER — PANTOPRAZOLE SODIUM 40 MG/1
40 TABLET, DELAYED RELEASE ORAL DAILY
Qty: 90 TABLET | Refills: 1 | Status: SHIPPED | OUTPATIENT
Start: 2022-09-14 | End: 2022-11-10 | Stop reason: SDUPTHER

## 2022-09-14 RX ORDER — LEVOTHYROXINE SODIUM 50 UG/1
50 TABLET ORAL DAILY
Qty: 90 TABLET | Refills: 1 | Status: SHIPPED | OUTPATIENT
Start: 2022-09-14 | End: 2023-04-05 | Stop reason: SDUPTHER

## 2022-09-14 RX ORDER — ERGOCALCIFEROL 1.25 MG/1
50000 CAPSULE ORAL
Qty: 12 CAPSULE | Refills: 1 | Status: SHIPPED | OUTPATIENT
Start: 2022-09-14 | End: 2023-10-20 | Stop reason: SDUPTHER

## 2022-09-14 RX ORDER — NEBIVOLOL HYDROCHLORIDE 5 MG/1
5 TABLET ORAL 2 TIMES DAILY
Qty: 180 TABLET | Refills: 1 | Status: SHIPPED | OUTPATIENT
Start: 2022-09-14 | End: 2023-03-22

## 2022-09-14 RX ORDER — EZETIMIBE 10 MG/1
10 TABLET ORAL DAILY
Qty: 90 TABLET | Refills: 1 | Status: SHIPPED | OUTPATIENT
Start: 2022-09-14 | End: 2022-11-10

## 2022-09-14 RX ORDER — EZETIMIBE 10 MG/1
10 TABLET ORAL DAILY
COMMUNITY
Start: 2022-07-06 | End: 2022-09-14 | Stop reason: SDUPTHER

## 2022-09-14 NOTE — LETTER
AUTHORIZATION FOR RELEASE OF   CONFIDENTIAL INFORMATION    Dear ,    We are seeing Frances Cardoza, date of birth 1960, in the clinic at [facility]. Katherine Scott MD is the patient's PCP. Frances Cardoza has an outstanding lab/procedure at the time we reviewed her chart. In order to help keep her health information updated, she has authorized us to request the following medical record(s):        (  )  MAMMOGRAM                                      (  )  COLONOSCOPY      (  )  PAP SMEAR                                          (  X)  OUTSIDE LAB RESULTS     (  )  DEXA SCAN                                          (  )  EYE EXAM            (  )  FOOT EXAM                                          ( X )  ENTIRE RECORD     (  )  OUTSIDE IMMUNIZATIONS                 ( X )Imaging         Please fax records to [facility], Abby E. Gandolfi, MD, [phone]     If you have any questions, please contact Araceli at [phone].           Patient Name: Frances Cardoza  : 1960  Patient Phone #: [phone]

## 2022-09-14 NOTE — LETTER
AUTHORIZATION FOR RELEASE OF   CONFIDENTIAL INFORMATION    Dear Dr.Labadie,    We are seeing Frances Cardoza, date of birth 1960, in the clinic at [facility]. Katherine Scott MD is the patient's PCP. Frances Cardoza has an outstanding lab/procedure at the time we reviewed her chart. In order to help keep her health information updated, she has authorized us to request the following medical record(s):        ( X )  MAMMOGRAM                                      (  )  COLONOSCOPY      (  )  PAP SMEAR                                          (  )  OUTSIDE LAB RESULTS     (  )  DEXA SCAN                                          (  )  EYE EXAM            (  )  FOOT EXAM                                          (  )  ENTIRE RECORD     (  )  OUTSIDE IMMUNIZATIONS                 (  )  _______________         Please fax records to [facility], Abby E. Gandolfi, MD, [phone]     If you have any questions, please contact Araceli at [phone].           Patient Name: Frances Cardoza  : 1960  Patient Phone #: [phone]

## 2022-09-14 NOTE — PROGRESS NOTES
[facility] Note    Chief Complaint      Chief Complaint   Patient presents with    Medication Refill     History of Present Illness      Frances Cardoza is a 62 y.o. female who presents today for chief complaint follow up chronic issues and est care.   HPI   Basically new pt appt, had seen PCP at [facility] and prior to that Noe, has been followed for   She has covid fog   HTN is on bystolic monotherapy, has tried other meds shes unsure  Has known hld is on crestor and zetia recently added by Dr Freddie nolasco surgery   HoT post op in  benign goiter   Vit d def is on ergo   Pre dm last A1c 5.8 is on metformin and   Takes cymbalta by Rheum Pwerdue for fibromyalgia   She has asthma mild intermittent, has been using HELLEN more recently bc of seasonal allergies, normally she doesn't need it unless she ill   GERD sees Joseph is on ppi has had egd in the past, due for scope now her mom  of colon cancer     CAD and carotid disease: famhis on dads side:dad had cva and PGF  of MI, brotehr with MI x2, P uncle with MI, GF  at 50, brother with MI at 30. She sees cards Dr Lancaster at . Had CNS vasualr imaging, carotid u/s, has had angigogram within 10 years she has non obstrictive cad it sounds like. She has had a stress and echo. She has maybe had a mild mi, has EKG changes she is unaware of   She has significant carotid atherosclerosis, was ref'd to gaurav Graves   She was ref'd to bariatric surgery as well     2 sisters with breast cancer, BRCA pos, also her MAunt, GGM maternal,   She had a sunconjunctival hemorrhage last week now resolved, - sees Dr Thomas     Had labs with Jason in July which were largely unremarkable.   HM: see above will get records .    Finacial exec sec for [employer]   Active Problem List with Overview Notes    Diagnosis Date Noted    Family history of colon cancer 2022     Mother       Family history of BRCA gene mutation  09/14/2022    Family history of breast cancer 09/14/2022     M GGM, M Aunt, 2 sisters both BRCA pos       Coronary artery disease involving native coronary artery of native heart without angina pectoris 09/14/2022    Carotid atherosclerosis 09/14/2022    Hypertension, benign 09/14/2022    Mixed hyperlipidemia 09/14/2022    Asthma, mild persistent 09/14/2022    Post-surgical hypothyroidism 09/14/2022    Fibromyalgia 09/14/2022    Vitamin D deficiency 09/14/2022       Health Maintenance   Topic Date Due    TETANUS VACCINE  Never done    Mammogram  04/04/2023    Lipid Panel  07/05/2027    Hepatitis C Screening  Completed       Past Medical History:   Diagnosis Date    GERD (gastroesophageal reflux disease)     High cholesterol     Hypertension     Plantar fasciitis     Thyroid disease        Past Surgical History:   Procedure Laterality Date    HERNIA REPAIR      THYROID SURGERY      TONSILLECTOMY      uterine ablation         family history includes Cancer in her mother; Hypertension in her father; Stroke in her father.    Social History     Tobacco Use    Smoking status: Never    Smokeless tobacco: Never   Substance Use Topics    Alcohol use: Yes     Comment: occ    Drug use: No       Review of Systems   Constitutional:  Negative for chills, fever, malaise/fatigue and weight loss.   Respiratory:  Negative for cough, sputum production, shortness of breath and wheezing.    Cardiovascular:  Negative for chest pain, palpitations, orthopnea and leg swelling.   Gastrointestinal:  Negative for constipation, diarrhea, nausea and vomiting.   Genitourinary:  Negative for dysuria, frequency, hematuria and urgency.      Outpatient Encounter Medications as of 9/14/2022   Medication Sig Dispense Refill    cholecalciferol, vitamin D3, 1,250 mcg (50,000 unit) capsule Decara 1,250 mcg (50,000 unit) capsule   TK ONE C PO  ONCE A MONTH      clobetasoL-emollient 0.05 % Crea clobetasol-emollient 0.05 % topical cream   SABINO AA BID       mupirocin (BACTROBAN) 2 % ointment Apply 1 g topically 2 (two) times daily.      [DISCONTINUED] albuterol (VENTOLIN HFA) 90 mcg/actuation inhaler Inhale 2 puffs into the lungs every 6 (six) hours as needed for Wheezing. Rescue 18 g 6    [DISCONTINUED] BYSTOLIC 5 mg Tab Take 1 tablet by mouth 2 (two) times a day.      [DISCONTINUED] clopidogreL (PLAVIX) 75 mg tablet Take 75 mg by mouth once daily.      [DISCONTINUED] DULoxetine (CYMBALTA) 30 MG capsule Take 1 capsule (30 mg total) by mouth once daily. 90 capsule 0    [DISCONTINUED] DULoxetine (CYMBALTA) 30 MG capsule Take 30 mg by mouth once daily.      [DISCONTINUED] ergocalciferol (ERGOCALCIFEROL) 50,000 unit Cap ergocalciferol (vitamin D2) 1,250 mcg (50,000 unit) capsule      [DISCONTINUED] ezetimibe (ZETIA) 10 mg tablet Take 10 mg by mouth once daily.      [DISCONTINUED] levothyroxine (SYNTHROID) 50 MCG tablet Take 1 tablet (50 mcg total) by mouth once daily. 90 tablet 0    [DISCONTINUED] metFORMIN (GLUCOPHAGE-XR) 500 MG ER 24hr tablet TAKE 1 TABLET(500 MG) BY MOUTH EVERY DAY 90 tablet 1    [DISCONTINUED] pantoprazole (PROTONIX) 40 MG tablet Take 1 tablet (40 mg total) by mouth once daily. 90 tablet 0    [DISCONTINUED] rosuvastatin (CRESTOR) 40 MG Tab Take 40 mg by mouth once daily.      albuterol (VENTOLIN HFA) 90 mcg/actuation inhaler Inhale 2 puffs into the lungs every 6 (six) hours as needed for Wheezing. Rescue 18 g 6    BYSTOLIC 5 mg Tab Take 1 tablet (5 mg total) by mouth 2 (two) times a day. 180 tablet 1    clopidogreL (PLAVIX) 75 mg tablet Take 1 tablet (75 mg total) by mouth once daily. 90 tablet 1    DULoxetine (CYMBALTA) 30 MG capsule Take 1 capsule (30 mg total) by mouth once daily. 90 capsule 1    ergocalciferol (ERGOCALCIFEROL) 50,000 unit Cap Take 1 capsule (50,000 Units total) by mouth every 7 days. 12 capsule 1    ezetimibe (ZETIA) 10 mg tablet Take 1 tablet (10 mg total) by mouth once daily. 90 tablet 1    levothyroxine (SYNTHROID) 50 MCG  "tablet Take 1 tablet (50 mcg total) by mouth once daily. 90 tablet 1    metFORMIN (GLUCOPHAGE-XR) 500 MG ER 24hr tablet Take 1 tablet (500 mg total) by mouth once daily. 90 tablet 1    montelukast (SINGULAIR) 10 mg tablet Take 1 tablet (10 mg total) by mouth every evening. 90 tablet 1    pantoprazole (PROTONIX) 40 MG tablet Take 1 tablet (40 mg total) by mouth once daily. 90 tablet 1    rosuvastatin (CRESTOR) 40 MG Tab Take 1 tablet (40 mg total) by mouth once daily. 90 tablet 1     No facility-administered encounter medications on file as of 9/14/2022.        Review of patient's allergies indicates:   Allergen Reactions    Coly-mycin m [colistimethate sodium]     Latex, natural rubber Rash           Physical Exam      Vital Signs  Temp: 97.9 °F (36.6 °C)  Pulse: 90  SpO2: 98 %  BP: 128/88  BP Location: Left arm  Patient Position: Sitting  Height and Weight  Height: 5' 5" (165.1 cm)  Weight: (!) 138.6 kg (305 lb 8.9 oz)  BSA (Calculated - sq m): 2.52 sq meters  BMI (Calculated): 50.8  Weight in (lb) to have BMI = 25: 149.9]    Physical Exam  Vitals reviewed.   Constitutional:       General: She is not in acute distress.     Appearance: She is well-developed. She is not diaphoretic.   HENT:      Head: Normocephalic and atraumatic.      Right Ear: External ear normal.      Left Ear: External ear normal.      Nose: Nose normal.   Eyes:      General:         Right eye: No discharge.         Left eye: No discharge.      Conjunctiva/sclera: Conjunctivae normal.   Cardiovascular:      Rate and Rhythm: Normal rate and regular rhythm.   Pulmonary:      Effort: Pulmonary effort is normal. No respiratory distress.      Breath sounds: Normal breath sounds.   Musculoskeletal:         General: Normal range of motion.      Cervical back: Normal range of motion.   Skin:     Coloration: Skin is not pale.      Findings: No rash.   Neurological:      Mental Status: She is alert and oriented to person, place, and time.   Psychiatric:   "       Behavior: Behavior normal.         Thought Content: Thought content normal.        Laboratory:  CBC:  No results for input(s): WBC, RBC, HGB, HCT, PLT, MCV, MCH, MCHC in the last 2160 hours.  CMP:  No results for input(s): GLU, CALCIUM, ALBUMIN, PROT, NA, K, CO2, CL, BUN, ALKPHOS, ALT, AST, BILITOT in the last 2160 hours.    Invalid input(s): CREATININ  URINALYSIS:  No results for input(s): COLORU, CLARITYU, SPECGRAV, PHUR, PROTEINUA, GLUCOSEU, BILIRUBINCON, BLOODU, WBCU, RBCU, BACTERIA, MUCUS, NITRITE, LEUKOCYTESUR, UROBILINOGEN, HYALINECASTS in the last 2160 hours.   LIPIDS:  Recent Labs   Lab Result Units 07/05/22  0736   HDL mg/dL 66   Cholesterol mg/dL 180   Triglycerides mg/dL 151*   LDL Calculated mg/dL 90   Hdl/Cholesterol Ratio  2.73     TSH:  No results for input(s): TSH in the last 2160 hours.  A1C:  No results for input(s): HGBA1C in the last 2160 hours.    Radiology:      Assessment/Plan     Frances Cardoza is a 62 y.o.female with:    1. Encounter for hepatitis C screening test for low risk patient  -     Hepatitis C Antibody    2. Family history of colon cancer  Overview:  Mother       3. Family history of BRCA gene mutation  Assessment & Plan:  Ref to breast surgery and genetics    Orders:  -     Ambulatory referral/consult to Genetics  -     Ambulatory referral/consult to Breast Surgery    4. Family history of breast cancer  Overview:  M GGM, M Aunt, 2 sisters both BRCA pos       5. Coronary artery disease involving native coronary artery of native heart without angina pectoris  Assessment & Plan:  Get cardiology records     Orders:  -     clopidogreL (PLAVIX) 75 mg tablet  -     rosuvastatin (CRESTOR) 40 MG Tab    6. Carotid atherosclerosis, unspecified laterality  Assessment & Plan:  Per vascular    Orders:  -     clopidogreL (PLAVIX) 75 mg tablet  -     rosuvastatin (CRESTOR) 40 MG Tab    7. Hypertension, benign  Assessment & Plan:  At goal no change     Orders:  -     BYSTOLIC 5 mg  Tab    8. Mixed hyperlipidemia  -     ezetimibe (ZETIA) 10 mg tablet  -     rosuvastatin (CRESTOR) 40 MG Tab    9. Mild persistent asthma without complication  Assessment & Plan:  Trial singulair     Orders:  -     montelukast (SINGULAIR) 10 mg tablet  -     albuterol (VENTOLIN HFA) 90 mcg/actuation inhaler    10. Post-surgical hypothyroidism  Assessment & Plan:  Euthyroid     Orders:  -     levothyroxine (SYNTHROID) 50 MCG tablet    11. Fibromyalgia  Assessment & Plan:  Continue cymbalta     Orders:  -     DULoxetine (CYMBALTA) 30 MG capsule    12. Vitamin D deficiency  -     ergocalciferol (ERGOCALCIFEROL) 50,000 unit Cap    13. Acute non-recurrent maxillary sinusitis  -     albuterol (VENTOLIN HFA) 90 mcg/actuation inhaler    14. Bronchitis  -     albuterol (VENTOLIN HFA) 90 mcg/actuation inhaler    15. Prediabetes  -     metFORMIN (GLUCOPHAGE-XR) 500 MG ER 24hr tablet    16. Gastroesophageal reflux disease, unspecified whether esophagitis present  -     pantoprazole (PROTONIX) 40 MG tablet       Use of the [employer] Patient Portal discussed and encouraged during today's visit  -Continue current medications and maintain follow up with specialists.  Return to clinic in .  Future Appointments   Date Time Provider Department Center   10/26/2022  8:30 AM Katherine Scott MD [facility]   11/15/2022  9:30 AM Tammi Rodney PA-C [facility]       Katherine Scott MD  9/19/2022 9:06 AM    Primary Care Internal Medicine

## 2022-09-14 NOTE — PATIENT INSTRUCTIONS
6 week follow up    OTC antihistamine (Zyrtec, Claritin, Allegra, or Xyxal) and a steroid nasal spray such as flonase for ear pressure

## 2022-09-14 NOTE — LETTER
AUTHORIZATION FOR RELEASE OF   CONFIDENTIAL INFORMATION    Dear Joseph,    We are seeing Frances Cardoza, date of birth 1960, in the clinic at [facility]. Katherine Scott MD is the patient's PCP. Frances Cardoza has an outstanding lab/procedure at the time we reviewed her chart. In order to help keep her health information updated, she has authorized us to request the following medical record(s):        (  )  MAMMOGRAM                                      ( X )  COLONOSCOPY      (  )  PAP SMEAR                                          (  )  OUTSIDE LAB RESULTS     (  )  DEXA SCAN                                          (  )  EYE EXAM            (  )  FOOT EXAM                                          (  )  ENTIRE RECORD     (  )  OUTSIDE IMMUNIZATIONS                 (  )  _______________         Please fax records to [facility], Abby E. Gandolfi, MD, [phone]     If you have any questions, please contact Araceli at [phone].           Patient Name: Frances Cardoza  : 1960  Patient Phone #: [phone]

## 2022-09-14 NOTE — Clinical Note
Mammogram  Imaging center Dr Labadie  Colonoscopy Dr Ruiz GI  Imaging/records/cardica studies Dr Lancaster

## 2022-10-03 ENCOUNTER — PATIENT MESSAGE (OUTPATIENT)
Dept: ADMINISTRATIVE | Facility: HOSPITAL | Age: 62
End: 2022-10-03
Payer: COMMERCIAL

## 2022-11-10 ENCOUNTER — OFFICE VISIT (OUTPATIENT)
Dept: INTERNAL MEDICINE | Facility: CLINIC | Age: 62
End: 2022-11-10
Payer: COMMERCIAL

## 2022-11-10 VITALS
TEMPERATURE: 98 F | DIASTOLIC BLOOD PRESSURE: 80 MMHG | HEART RATE: 81 BPM | HEIGHT: 65 IN | OXYGEN SATURATION: 100 % | SYSTOLIC BLOOD PRESSURE: 146 MMHG | BODY MASS INDEX: 48.82 KG/M2 | WEIGHT: 293 LBS

## 2022-11-10 DIAGNOSIS — K21.9 GASTROESOPHAGEAL REFLUX DISEASE, UNSPECIFIED WHETHER ESOPHAGITIS PRESENT: ICD-10-CM

## 2022-11-10 DIAGNOSIS — F33.8 SEASONAL AFFECTIVE DISORDER: ICD-10-CM

## 2022-11-10 DIAGNOSIS — Z84.81 FAMILY HISTORY OF BRCA GENE MUTATION: ICD-10-CM

## 2022-11-10 DIAGNOSIS — H66.90 OTITIS MEDIA, UNSPECIFIED LATERALITY, UNSPECIFIED OTITIS MEDIA TYPE: Primary | ICD-10-CM

## 2022-11-10 PROCEDURE — 3044F PR MOST RECENT HEMOGLOBIN A1C LEVEL <7.0%: ICD-10-PCS | Mod: CPTII,S$GLB,, | Performed by: INTERNAL MEDICINE

## 2022-11-10 PROCEDURE — 3079F DIAST BP 80-89 MM HG: CPT | Mod: CPTII,S$GLB,, | Performed by: INTERNAL MEDICINE

## 2022-11-10 PROCEDURE — 3077F SYST BP >= 140 MM HG: CPT | Mod: CPTII,S$GLB,, | Performed by: INTERNAL MEDICINE

## 2022-11-10 PROCEDURE — 99214 OFFICE O/P EST MOD 30 MIN: CPT | Mod: S$GLB,,, | Performed by: INTERNAL MEDICINE

## 2022-11-10 PROCEDURE — 1159F PR MEDICATION LIST DOCUMENTED IN MEDICAL RECORD: ICD-10-PCS | Mod: CPTII,S$GLB,, | Performed by: INTERNAL MEDICINE

## 2022-11-10 PROCEDURE — 3079F PR MOST RECENT DIASTOLIC BLOOD PRESSURE 80-89 MM HG: ICD-10-PCS | Mod: CPTII,S$GLB,, | Performed by: INTERNAL MEDICINE

## 2022-11-10 PROCEDURE — 1159F MED LIST DOCD IN RCRD: CPT | Mod: CPTII,S$GLB,, | Performed by: INTERNAL MEDICINE

## 2022-11-10 PROCEDURE — 99214 PR OFFICE/OUTPT VISIT, EST, LEVL IV, 30-39 MIN: ICD-10-PCS | Mod: S$GLB,,, | Performed by: INTERNAL MEDICINE

## 2022-11-10 PROCEDURE — 3008F PR BODY MASS INDEX (BMI) DOCUMENTED: ICD-10-PCS | Mod: CPTII,S$GLB,, | Performed by: INTERNAL MEDICINE

## 2022-11-10 PROCEDURE — 3008F BODY MASS INDEX DOCD: CPT | Mod: CPTII,S$GLB,, | Performed by: INTERNAL MEDICINE

## 2022-11-10 PROCEDURE — 3077F PR MOST RECENT SYSTOLIC BLOOD PRESSURE >= 140 MM HG: ICD-10-PCS | Mod: CPTII,S$GLB,, | Performed by: INTERNAL MEDICINE

## 2022-11-10 PROCEDURE — 99999 PR PBB SHADOW E&M-EST. PATIENT-LVL IV: CPT | Mod: PBBFAC,,, | Performed by: INTERNAL MEDICINE

## 2022-11-10 PROCEDURE — 3044F HG A1C LEVEL LT 7.0%: CPT | Mod: CPTII,S$GLB,, | Performed by: INTERNAL MEDICINE

## 2022-11-10 PROCEDURE — 99999 PR PBB SHADOW E&M-EST. PATIENT-LVL IV: ICD-10-PCS | Mod: PBBFAC,,, | Performed by: INTERNAL MEDICINE

## 2022-11-10 RX ORDER — METHYLPREDNISOLONE 4 MG/1
TABLET ORAL
Qty: 21 EACH | Refills: 0 | Status: SHIPPED | OUTPATIENT
Start: 2022-11-10 | End: 2022-12-01

## 2022-11-10 RX ORDER — PANTOPRAZOLE SODIUM 40 MG/1
40 TABLET, DELAYED RELEASE ORAL 2 TIMES DAILY
Qty: 180 TABLET | Refills: 1 | Status: SHIPPED | OUTPATIENT
Start: 2022-11-10 | End: 2023-06-23 | Stop reason: SDUPTHER

## 2022-11-10 RX ORDER — AZITHROMYCIN 250 MG/1
TABLET, FILM COATED ORAL
Qty: 4 TABLET | Refills: 0 | Status: SHIPPED | OUTPATIENT
Start: 2022-11-10 | End: 2022-11-15

## 2022-11-10 RX ORDER — DULOXETIN HYDROCHLORIDE 20 MG/1
40 CAPSULE, DELAYED RELEASE ORAL DAILY
Qty: 60 CAPSULE | Refills: 1 | Status: SHIPPED | OUTPATIENT
Start: 2022-11-10 | End: 2023-01-22

## 2022-11-10 NOTE — PROGRESS NOTES
[facility] Note    Chief Complaint      Chief Complaint   Patient presents with    Fatigue     Started on Friday with fatigue and vomiting, but has not happened since. 2-3 people in office have RSV     Cough     Slightly productive cough in am but dry throughout the day, started on Monday     Headache     History of Present Illness      Frances Cardoza is a 62 y.o. female who presents today for chief complaint ear pain and coughh, depression .     HPI   Has exposure to RSV, cough x5 days, had emesis 1 week ago no recurrence, no fever,   Had barium swallow and has hiatal hernia uses ppi bid but having brakthrough, sees GI Angiea   Feeling bad sinc ethe end of daylight savings, overwhelmed and tearful, on [place] [address]   Active Problem List with Overview Notes    Diagnosis Date Noted    Seasonal affective disorder 11/10/2022    Family history of colon cancer 09/14/2022     Mother       Family history of BRCA gene mutation 09/14/2022    Family history of breast cancer 09/14/2022     M GGM, M Aunt, 2 sisters both BRCA pos       Coronary artery disease involving native coronary artery of native heart without angina pectoris 09/14/2022    Carotid atherosclerosis 09/14/2022    Hypertension, benign 09/14/2022    Mixed hyperlipidemia 09/14/2022    Asthma, mild persistent 09/14/2022    Post-surgical hypothyroidism 09/14/2022    Fibromyalgia 09/14/2022    Vitamin D deficiency 09/14/2022       Health Maintenance   Topic Date Due    TETANUS VACCINE  Never done    Mammogram  04/04/2023    Lipid Panel  07/05/2027    Hepatitis C Screening  Completed       Past Medical History:   Diagnosis Date    GERD (gastroesophageal reflux disease)     High cholesterol     Hypertension     Plantar fasciitis     Seasonal affective disorder 11/10/2022    Thyroid disease        Past Surgical History:   Procedure Laterality Date    HERNIA REPAIR      THYROID SURGERY      TONSILLECTOMY      uterine ablation         family  history includes Cancer in her mother; Hypertension in her father; Stroke in her father.    Social History     Tobacco Use    Smoking status: Never    Smokeless tobacco: Never   Substance Use Topics    Alcohol use: Yes     Comment: occ    Drug use: No       Review of Systems   Constitutional:  Positive for malaise/fatigue. Negative for chills, fever and weight loss.   HENT:  Positive for ear pain and sore throat. Negative for congestion and sinus pain.    Respiratory:  Positive for cough. Negative for sputum production, shortness of breath and wheezing.    Cardiovascular:  Negative for chest pain, palpitations, orthopnea and leg swelling.   Gastrointestinal:  Positive for abdominal pain and heartburn. Negative for constipation, diarrhea, nausea and vomiting.   Genitourinary:  Negative for dysuria, frequency, hematuria and urgency.   Psychiatric/Behavioral:  Positive for depression. The patient is nervous/anxious.       Outpatient Encounter Medications as of 11/10/2022   Medication Sig Dispense Refill    BYSTOLIC 5 mg Tab Take 1 tablet (5 mg total) by mouth 2 (two) times a day. (Patient taking differently: Take 5 mg by mouth 2 (two) times a day. Brand name only) 180 tablet 1    cholecalciferol, vitamin D3, 1,250 mcg (50,000 unit) capsule Decara 1,250 mcg (50,000 unit) capsule   TK ONE C PO  ONCE A MONTH      clobetasoL-emollient 0.05 % Crea clobetasol-emollient 0.05 % topical cream   SABINO AA BID      ergocalciferol (ERGOCALCIFEROL) 50,000 unit Cap Take 1 capsule (50,000 Units total) by mouth every 7 days. 12 capsule 1    levothyroxine (SYNTHROID) 50 MCG tablet Take 1 tablet (50 mcg total) by mouth once daily. 90 tablet 1    mupirocin (BACTROBAN) 2 % ointment Apply 1 g topically 2 (two) times daily.      rosuvastatin (CRESTOR) 40 MG Tab Take 1 tablet (40 mg total) by mouth once daily. 90 tablet 1    [DISCONTINUED] DULoxetine (CYMBALTA) 30 MG capsule Take 1 capsule (30 mg total) by mouth once daily. 90 capsule 1     "[DISCONTINUED] pantoprazole (PROTONIX) 40 MG tablet Take 1 tablet (40 mg total) by mouth once daily. 90 tablet 1    albuterol (VENTOLIN HFA) 90 mcg/actuation inhaler Inhale 2 puffs into the lungs every 6 (six) hours as needed for Wheezing. Rescue 18 g 6    azithromycin (Z-JOHN) 250 MG tablet Take 2 tablets by mouth on day 1; Take 1 tablet by mouth on days 2-5 4 tablet 0    DULoxetine (CYMBALTA) 20 MG capsule Take 2 capsules (40 mg total) by mouth once daily. 60 capsule 1    metFORMIN (GLUCOPHAGE-XR) 500 MG ER 24hr tablet Take 1 tablet (500 mg total) by mouth once daily. (Patient not taking: Reported on 11/10/2022) 90 tablet 1    methylPREDNISolone (MEDROL DOSEPACK) 4 mg tablet use as directed 21 each 0    pantoprazole (PROTONIX) 40 MG tablet Take 1 tablet (40 mg total) by mouth 2 (two) times daily. 180 tablet 1    [DISCONTINUED] clopidogreL (PLAVIX) 75 mg tablet Take 1 tablet (75 mg total) by mouth once daily. (Patient not taking: Reported on 11/10/2022) 90 tablet 1    [DISCONTINUED] ezetimibe (ZETIA) 10 mg tablet Take 1 tablet (10 mg total) by mouth once daily. 90 tablet 1     No facility-administered encounter medications on file as of 11/10/2022.        Review of patient's allergies indicates:   Allergen Reactions    Coly-mycin m [colistimethate sodium]     Latex, natural rubber Rash           Physical Exam      Vital Signs  Temp: 98.1 °F (36.7 °C)  Pulse: 81  SpO2: 100 %  BP: (!) 146/80  BP Location: Right arm  Patient Position: Sitting  Height and Weight  Height: 5' 5" (165.1 cm)  Weight: (!) 138.4 kg (305 lb 1.9 oz)  BSA (Calculated - sq m): 2.52 sq meters  BMI (Calculated): 50.8  Weight in (lb) to have BMI = 25: 149.9]    Physical Exam  Vitals reviewed.   Constitutional:       General: She is not in acute distress.     Appearance: She is well-developed. She is not diaphoretic.   HENT:      Head: Normocephalic and atraumatic.      Right Ear: External ear normal. A middle ear effusion is present.      Left Ear: " External ear normal. A middle ear effusion is present.      Nose: Nose normal.      Right Sinus: No maxillary sinus tenderness or frontal sinus tenderness.      Left Sinus: No maxillary sinus tenderness or frontal sinus tenderness.      Mouth/Throat:      Pharynx: Posterior oropharyngeal erythema present. No oropharyngeal exudate.   Eyes:      General:         Right eye: No discharge.         Left eye: No discharge.      Conjunctiva/sclera: Conjunctivae normal.   Cardiovascular:      Rate and Rhythm: Normal rate and regular rhythm.      Heart sounds: Normal heart sounds.   Pulmonary:      Effort: Pulmonary effort is normal. No respiratory distress.      Breath sounds: Normal breath sounds.   Musculoskeletal:         General: Normal range of motion.      Cervical back: Normal range of motion.   Skin:     Coloration: Skin is not pale.      Findings: No rash.   Neurological:      Mental Status: She is alert and oriented to person, place, and time.   Psychiatric:         Behavior: Behavior normal.         Thought Content: Thought content normal.        Laboratory:  CBC:  No results for input(s): WBC, RBC, HGB, HCT, PLT, MCV, MCH, MCHC in the last 2160 hours.  CMP:  No results for input(s): GLU, CALCIUM, ALBUMIN, PROT, NA, K, CO2, CL, BUN, ALKPHOS, ALT, AST, BILITOT in the last 2160 hours.    Invalid input(s): CREATININ  URINALYSIS:  No results for input(s): COLORU, CLARITYU, SPECGRAV, PHUR, PROTEINUA, GLUCOSEU, BILIRUBINCON, BLOODU, WBCU, RBCU, BACTERIA, MUCUS, NITRITE, LEUKOCYTESUR, UROBILINOGEN, HYALINECASTS in the last 2160 hours.   LIPIDS:  No results for input(s): TSH, HDL, CHOL, TRIG, LDLCALC, CHOLHDL, NONHDLCHOL, TOTALCHOLEST in the last 2160 hours.  TSH:  No results for input(s): TSH in the last 2160 hours.  A1C:  No results for input(s): HGBA1C in the last 2160 hours.    Radiology:      Assessment/Plan     Frances Cardoza is a 62 y.o.female with:    1. Otitis media, unspecified laterality, unspecified  otitis media type  -     methylPREDNISolone (MEDROL DOSEPACK) 4 mg tablet; use as directed  Dispense: 21 each; Refill: 0  -     azithromycin (Z-JOHN) 250 MG tablet; Take 2 tablets by mouth on day 1; Take 1 tablet by mouth on days 2-5  Dispense: 4 tablet; Refill: 0    2. Seasonal affective disorder  Assessment & Plan:  Increase cymbalta to 40     Orders:  -     DULoxetine (CYMBALTA) 20 MG capsule; Take 2 capsules (40 mg total) by mouth once daily.  Dispense: 60 capsule; Refill: 1    3. Gastroesophageal reflux disease, unspecified whether esophagitis present  -     pantoprazole (PROTONIX) 40 MG tablet; Take 1 tablet (40 mg total) by mouth 2 (two) times daily.  Dispense: 180 tablet; Refill: 1    4. Family history of BRCA gene mutation  Assessment & Plan:  She is scheduled for genetics consult          Use of the [employer] Patient Portal discussed and encouraged during today's visit  -Continue current medications and maintain follow up with specialists.  Return to clinic in 3 mos .  Future Appointments   Date Time Provider Department Center   11/15/2022  9:30 AM Tammi Rodney PA-C [facility] NATE Scott MD  11/10/2022 8:43 AM    Primary Care Internal Medicine

## 2022-11-10 NOTE — PATIENT INSTRUCTIONS
OTC antihistamine (Zyrtec, Claritin, Allegra, or Xyxal) and a steroid nasal spray such as flonase   decongestant with pseudoephedrine as tolerated, guaifenesin for expectorant/thin secretions-- Mucinex D   Delsym DM OTC cough syrup     Future Appointments   Date Time Provider Department Center   11/15/2022  9:30 AM Tammi Rodney PA-C [facility] NATE Reid

## 2022-11-11 ENCOUNTER — PATIENT MESSAGE (OUTPATIENT)
Dept: HEMATOLOGY/ONCOLOGY | Facility: CLINIC | Age: 62
End: 2022-11-11
Payer: COMMERCIAL

## 2022-11-14 ENCOUNTER — PATIENT MESSAGE (OUTPATIENT)
Dept: HEMATOLOGY/ONCOLOGY | Facility: CLINIC | Age: 62
End: 2022-11-14
Payer: COMMERCIAL

## 2022-11-14 NOTE — PROGRESS NOTES
"Department of Hematology and Oncology  [facility]    Cancer Genetics  Initial Consult    Date of Service:  11/15/22  Visit Provider:  Tammi Rodney PA-C  Collaborating Physician:  Josi Luciano MD    Patient:  Frances Cardoza  :  1960  MRN:  [id number]     Referring Provider    Katherine Scott MD  [address]  [facility] [address], [address] [place] [address]    SUBJECTIVE      Chief Complaint: Genetic evaluation  History of Present Illness (HPI):  Frances Cardoza ("Frances"), 62 y.o., assigned female sex at birth, is new to the [facility] and to me.  She presents for genetic cancer risk assessment given her family history of cancer and a reported BRCA mutation in her sisters and maternal relative. This mutation has not been confirmed.     Genetic Assessment History  Germline cancer-genetic testing: No  Personal history of cancer: No  Benign tumors: Yes - Thyroid nodules  Colon polyps: No. Last colonoscopy 6 years ago showed polyps. Plan is to repeat in 5 years.   Pancreatitis: No  Chemoprevention: Never used  Uterus and ovaries intact: Yes  Ashkenazi Gnosticist ancestry: No    Past Medical History:   Diagnosis Date    GERD (gastroesophageal reflux disease)     High cholesterol     Hypertension     Plantar fasciitis     Post-surgical hypothyroidism     Seasonal affective disorder 11/10/2022     Patient Active Problem List    Diagnosis Date Noted    Seasonal affective disorder 11/10/2022    Family history of colon cancer 2022    Family history of BRCA gene mutation 2022    Family history of breast cancer 2022    Coronary artery disease involving native coronary artery of native heart without angina pectoris 2022    Carotid atherosclerosis 2022    Hypertension, benign 2022    Mixed hyperlipidemia 2022    Asthma, mild persistent 2022    Post-surgical hypothyroidism " 09/14/2022    Fibromyalgia 09/14/2022    Vitamin D deficiency 09/14/2022      Family History  Germline cancer-genetic testing in blood relatives:  No  Consanguinity in ancestors:  No  No known history of cancer of colorectal polyps in extended family other than noted below.       Family History   Problem Relation Age of Onset    Colon cancer Mother         mets to lung, etc    Stroke Father 71    Hypertension Father     BRCA 1/2 Sister     Breast cancer Sister 58        s/p bilat mast    BRCA 1/2 Sister     Breast cancer Sister 56        s/p bilat mast    Heart attacks under age 50 Brother 30        x2    Heart attacks under age 50 Paternal Grandfather     BRCA 1/2 Maternal Aunt     Breast cancer Maternal Aunt 25    Stroke Paternal Uncle     Heart attack Paternal Uncle     Cancer Paternal Grandmother 88        soft tissue of her upper arm    Cancer Other 1        rare form of cancer only elderly people get, only seen in 5 children world wide, at base of neck    Cancer Maternal Uncle 50        prostate?    Breast cancer Other 80        s/p bilat mastectomy      Review of Systems  See HPI.    Pain 0/10  Recent falls: None   Patient's Distress Score today was 8/10 (with 10 being the worst). Patient attributes this to seasonal affective disorder, work stress, weight gain, family stress.  Patient is not experiencing suicidal or homicidal ideations (SI/HI).  Offered patient a referral to Social Work and Psychology, and patient declined.      OBJECTIVE      Physical Exam  Very pleasant patient.  Unaccompanied  Vitals signs:  There were no vitals filed for this visit.   Constitutional      Appearance:  Well developed and well nourished. No apparent distress.   Pulmonary     Effort:  Normal  Neurological     Mental Status:  Alert and oriented.     Coordination:  Normal  Psychiatric        Mood and Affect: Normal     Thought Content:  Normal       Speech:  Normal     Behavior:  Normal     Judgment:   Normal  Genetics-specific     It is my assessment that the patient is ready to proceed with cancer-genetic testing from a psychosocial perspective.    COUNSELING      Germline cancer-genetic testing is the testing of genes associated with cancer, known as cancer susceptibility genes.  Just as these genes are inherited from parents, mutations in these genes can be inherited, as well.  A mutation in a cancer susceptibility gene adversely affects the gene's ability to prevent cancer; therefore, carriers of cancer susceptibility gene mutations may be at increased risk for certain cancers.    A small percentage of cancers are caused by an cancer susceptibility gene mutation, meaning the cancer is genetic/hereditary; rather, most cancers are sporadic.  Causes of sporadic cancers may include environmental risk factors, lifestyle risk factors, and non-modifiable risk factors.  It is important to note that members of a family often share not only their genetics but also risk factors including environmental and lifestyle risk factors.    Results of genetic testing include positive, negative, and variant of unknown significance (VUS).  A positive result indicates the presence of at least one clinically significant mutation, and the patient's specific cancer risks vary depending upon the tumor-suppressor gene(s) in which there is/are a mutation(s).  With a positive result, in some cases, depending upon the specific result and the patient's clinical history, modified management may be recommended, including measures for risk reduction and/or surveillance; however, modified management is not always an option.  A negative result indicates that no clinically significant mutations were identified in the gene(s) tested.  A VUS indicates that there is not presently enough data for the laboratory to make a determination as to whether the variant is clinically significant; VUSs are not typically acted upon clinically.      The ability  to interpret the meaning of a negative genetic testing result in genes associated with cancer with which the patient has not personally been affected, when done prior to testing the appropriate affected relative(s), is significantly limited.  A negative result in the patient does not indicate that she cannot develop cancer, and, in fact, the patient may even be at increased risk for cancer based on shared risk factors with affected relatives.  The most informative candidates for initial genetic testing in a family are those who have been affected with cancer.    Modified management may also be recommended, even with a result of no or unknown significance, based upon risk assessment that incorporates the family history.      Sometimes, depending upon the genetic testing result and the cancer diagnosis, additional/modified treatments may be an option, though this is not guaranteed.    If Frances tests positive for a mutation, her first-degree relatives would each have a 50% chance of having the same mutation, and other, more distantly related blood-relatives would also be at risk of having the same mutation.       The Genetic Information Nondiscrimination Act (IDRIS) is U.S. federal legislation that provides some protections against use of an individual's genetic information by their health insurer and by their employer.  Title I of IDRIS prohibits most health insurers from utilizing an individual's genetic information to make decisions regarding insurance eligibility, coverage, underwriting, or premium charges.  Title II of IDRIS prohibits covered entities, which include employers, employment agencies, labor organizations, and joint labor-management training and apprenticeship programs, from requesting, requiring, or disclosing the genetic information of employees and applicants.  IDRIS also prohibits health insurers and employers from asking or mandating that an individual take a genetic test.  IDRIS does not protect  individuals from genetic discrimination toward health insurance obtained through a job with the  or through the Federal Employees Health Benefits Plan; from genetic discrimination by employers with fewer than 15 employees; or from genetic discrimination by any other type of policies/entities, including but not limited to life insurance, disability insurance, long-term care insurance,  benefits, and [employer] [facility] benefits.     An outside laboratory would perform the testing after a blood sample is collected here at the [facility] or a saliva sample is collected by the patient at home.  With genetic testing, there is a potential for the patient to incur out-of-pocket costs.  Results can take several weeks.  Post-test genetic counseling can be conducted once the genetic testing results are available.     Questions were encouraged and answered to the patient's satisfaction, and she verbalized understanding of information and agreement with the plan.       ASSESSMENT/PLAN      A cancer-genetic evaluation and pre-test genetic counseling were conducted. Based on the information provided by Frances, her personal and/or family history is suggestive of a potential hereditary predisposition to cancer. The recommendation is to proceed with germline testing to include the genes commonly associated with hereditary breast cancer (ARNULFO, BARD1, BRCA1, BRCA2, CDH1, CHEK2, NF1, PALB2, PTEN, RAD51C, RAD51D, STK11, TP53) and colorectal cancer and polyp syndromes (APC, AXIN2, BMPR1A, CHEK2, EPCAM, GREM1, MLH1, MSH2, MSH3, MSH6, MUTYH, NTHL1, PMS2, POLD1, POLE, PTEN, SMAD4, STK11, TP53).     Frances was given the option of proceeding with testing now, deferring testing to a later date, or declining testing and opted to proceed. Results are expected approximately 2-3 weeks after the genetic testing laboratory receives the specimen.     Genetic test: [facility] BRCA1/2 Core Panel (05524) + Multi-Cancer +RNA Panel,  84 genes (689134.1)  Specimen type: Blood   Collection: By [facility] today.    Consent: The patient has provided her written informed consent.       1. Encounter for nonprocreative genetic counseling    2. Family history of breast cancer    3. Family history of BRCA gene mutation  - Ambulatory referral/consult to Genetics  - Genetic Misc Sendout Test, Blood; Future    4. Family history of colon cancer    - Proceed with germline genetic testing.  - Follow up with results.    Approximately 60 minutes were spent face-to-face with the patient.  Approximately 75 minutes in total were spent on this encounter, which includes face-to-face time and non-face-to-face time preparing to see the patient (e.g., review of tests), obtaining and/or reviewing separately obtained history, documenting clinical information in the electronic or other health record, independently interpreting results (not separately reported) and communicating results to the patient/family/caregiver, or care coordination (not separately reported).     REFERENCES     National Comprehensive Cancer Network (NCCN). (2021). Genetic/familial high-risk assessment: Breast, ovarian, and pancreatic. NCCN Clinical Practice Guidelines in Oncology (NCCN Guidelines), Version 1.2022.  National Comprehensive Cancer Network (NCCN). (2021). Genetic/familial high-risk assessment: Colorectal. NCCN Clinical Practice Guidelines in Oncology (NCCN Guidelines), Version 1.2021.    Tammi Rodney PA-C, MPAS, PA-C  Physician Assistant, Hereditary/High Risk Clinic  Hematology/Oncology, [facility]

## 2022-11-15 ENCOUNTER — LAB VISIT (OUTPATIENT)
Dept: LAB | Facility: HOSPITAL | Age: 62
End: 2022-11-15
Payer: COMMERCIAL

## 2022-11-15 ENCOUNTER — OFFICE VISIT (OUTPATIENT)
Dept: HEMATOLOGY/ONCOLOGY | Facility: CLINIC | Age: 62
End: 2022-11-15
Payer: COMMERCIAL

## 2022-11-15 DIAGNOSIS — Z84.81 FAMILY HISTORY OF BRCA GENE MUTATION: ICD-10-CM

## 2022-11-15 DIAGNOSIS — Z80.3 FAMILY HISTORY OF BREAST CANCER: ICD-10-CM

## 2022-11-15 DIAGNOSIS — Z71.83 ENCOUNTER FOR NONPROCREATIVE GENETIC COUNSELING: Primary | ICD-10-CM

## 2022-11-15 DIAGNOSIS — Z80.0 FAMILY HISTORY OF COLON CANCER: ICD-10-CM

## 2022-11-15 PROCEDURE — 1159F PR MEDICATION LIST DOCUMENTED IN MEDICAL RECORD: ICD-10-PCS | Mod: CPTII,S$GLB,, | Performed by: PHYSICIAN ASSISTANT

## 2022-11-15 PROCEDURE — 99205 OFFICE O/P NEW HI 60 MIN: CPT | Mod: S$GLB,,, | Performed by: PHYSICIAN ASSISTANT

## 2022-11-15 PROCEDURE — 1159F MED LIST DOCD IN RCRD: CPT | Mod: CPTII,S$GLB,, | Performed by: PHYSICIAN ASSISTANT

## 2022-11-15 PROCEDURE — 99205 PR OFFICE/OUTPT VISIT, NEW, LEVL V, 60-74 MIN: ICD-10-PCS | Mod: S$GLB,,, | Performed by: PHYSICIAN ASSISTANT

## 2022-11-15 PROCEDURE — 99999 PR PBB SHADOW E&M-EST. PATIENT-LVL III: CPT | Mod: PBBFAC,,, | Performed by: PHYSICIAN ASSISTANT

## 2022-11-15 PROCEDURE — 3044F HG A1C LEVEL LT 7.0%: CPT | Mod: CPTII,S$GLB,, | Performed by: PHYSICIAN ASSISTANT

## 2022-11-15 PROCEDURE — 99999 PR PBB SHADOW E&M-EST. PATIENT-LVL III: ICD-10-PCS | Mod: PBBFAC,,, | Performed by: PHYSICIAN ASSISTANT

## 2022-11-15 PROCEDURE — 36415 COLL VENOUS BLD VENIPUNCTURE: CPT | Performed by: PHYSICIAN ASSISTANT

## 2022-11-15 PROCEDURE — 3044F PR MOST RECENT HEMOGLOBIN A1C LEVEL <7.0%: ICD-10-PCS | Mod: CPTII,S$GLB,, | Performed by: PHYSICIAN ASSISTANT

## 2022-11-30 ENCOUNTER — PATIENT MESSAGE (OUTPATIENT)
Dept: INTERNAL MEDICINE | Facility: CLINIC | Age: 62
End: 2022-11-30
Payer: COMMERCIAL

## 2022-11-30 RX ORDER — IPRATROPIUM BROMIDE 0.5 MG/2.5ML
500 SOLUTION RESPIRATORY (INHALATION) 4 TIMES DAILY
Qty: 75 ML | Refills: 1 | Status: SHIPPED | OUTPATIENT
Start: 2022-11-30 | End: 2023-03-22

## 2022-11-30 RX ORDER — IPRATROPIUM BROMIDE 0.5 MG/2.5ML
500 SOLUTION RESPIRATORY (INHALATION) 4 TIMES DAILY
COMMUNITY
End: 2022-11-30 | Stop reason: SDUPTHER

## 2022-11-30 NOTE — TELEPHONE ENCOUNTER
Care Due:                  Date            Visit Type   Department     Provider  --------------------------------------------------------------------------------                                MYCHART                              FOLLOWUP/OF  [facility] PRIMARY  Last Visit: 11-      FICE VISIT   CARE           Katherine Scott  Next Visit: None Scheduled  None         None Found                                                            Last  Test          Frequency    Reason                     Performed    Due Date  --------------------------------------------------------------------------------    HBA1C.......  6 months...  metFORMIN................  03- 09-    Lipid Panel.  12 months..  rosuvastatin.............  Not Found    Overdue    TSH.........  12 months..  levothyroxine............  Not Found    Overdue    [employer] Embedded Care Gaps. Reference number: [id number]. 11/30/2022   4:04:29 PM CST

## 2022-12-05 LAB
GENETIC COUNSELING?: YES
GENSO SPECIMEN TYPE: NORMAL
MISCELLANEOUS GENETIC TEST NAME: NORMAL
PARTENTAL OR SIBLING TESTING?: NO
REFERENCE LAB: NORMAL
TEST RESULT: NORMAL

## 2022-12-08 ENCOUNTER — DOCUMENTATION ONLY (OUTPATIENT)
Dept: HEMATOLOGY/ONCOLOGY | Facility: CLINIC | Age: 62
End: 2022-12-08
Payer: COMMERCIAL

## 2022-12-08 NOTE — PROGRESS NOTES
"Hereditary and High Risk Clinic  Department of Hematology and Oncology  [facility]    Cancer Genetics  Results Disclosure    Name: Frances Cardoza ("Frances")  MRN: [id number]    GERMLINE GENETIC TESTING       The [facility] Multi-Cancer DNA+RNA Panel analyzes 84 genes associated with hereditary cancer:  AIP, ALK, APC, ARNULFO, AXIN2, BAP1, BARD1, BLM, BMPR1A, BRCA1, BRCA2, BRIP1, CASR, CDC73, CDH1, CDK4, CDKN1B, CDKN1C, CDKN2A, CEBPA, CHEK2, CTNNA1, DICER1, DIS3L2, EGFR, EPCAM, FH, FLCN, GATA2, GPC3, GREM1, HOXB13, HRAS, KIT, MAX, MEN1, MET, MITF, MLH1, MSH2, MSH3, MSH6, MUTYH, NBN, NF1, NF2, NTHL1, PALB2, PDGFRA, PHOX2B, PMS2, POLD1, POLE, POT1, LROXK0L, PTCH1, PTEN, RAD50, RAD51C, RAD51D, RB1, RECQL4, RET, RUNX1, SDHA, SDHAF2, SDHB, SDHC, SDHD, SMAD4, SMARCA4, SMARCB1, SMARCE1, STK11, SUFU, TERC, TERT, EDST461, TP53, TSC1, TSC2, VHL, WRN, WT1.    RESULT: Negative  Pathogenic (harmful) variants:  The test did not identify any genetic variants known to cause cancer.     Variants of uncertain significance:   The test did not identify any variants of uncertain significance.         Personal and Family History:   Frances has no personal history of cancer.    Frances's family history is significant for:   Sister: Breast cancer 56, BRCA+ (not confirmed)  Sister: Breast cancer 58, BRCA+ (not confirmed)  Nephew: Unspecified rare cancer at age 1  Mother: Colon ,  at 58  Maternal aunt: Breast cancer 25, BRCA+ (not confirmed)  Maternal uncle: Unspecified cancer at 50, possibly prostate  PGM: Soft tissue cancer of upper arm    Interpretation of Results:     Frances tested negative for pathogenic (harmful) mutations in BRCA1, BRCA2 and the genes commonly associated with hereditary breast, colon, prostate and other cancers.   BRCA: BRCA1/2 are associated with breast, ovarian, prostate and pancreatic cancer. Frances's sisters and maternal aunt have had breast cancer and are reported to have a BRCA mutation, which " means your mother had the BRCA mutation. Frances did not inherit the BRCA mutation, so her risk for breast cancer is lower than that of her sisters and maternal aunt. The BRCA genes are also associated with prostate cancer. If this is the cancer her uncle had at age 50, he may also have the BRCA mutation.   Colon cancer: Regarding her mother's history of metastatic colon cancer in her 50s, Frances's negative result is considered an uninformative negative, as it is unknown if her mother had a genetic variant she did not inherit. Since there are no other colon cancers in the family and no cancers associated with any of the common hereditary colon cancer syndromes, it is quite possible that her mother had a random cancer.   Relatives: Anyone who is a blood relative of Frances's mother and maternal aunts should undergo genetic testing for the familial BRCA gene mutation.   Anyone interested in pursuing genetic counseling/testing can contact the [facility] for an in-personal or virtual appointment in [place] or [place] by calling [phone]. The [place] provider can see virtual patients in [place] or [place]. The [place] providers can only see virtual patients located in [place]. Virtual patients must be able to access the virtual visit through the MyChart ([facility]) portal. Anyone who lives in another state or prefers to see someone in-person closer to home can visit [email or web address] to locate a local genetic specialist in their area.    Cancer Risks:   Breast cancer: Frances's lifetime risk for breast cancer as calculated today using the Tyrer-Cuzick model is 3.3% if her relatives have a BRCA1 mutation and 6.2% if they have a BRCA2 mutation. This places her in the average risk range. Annual mammogram, clinical breast exam and self-exams are recommended.   Colon cancer: Frances is considered to be at increased risk for colon cancer due to having a first-degree relative with colon cancer and  should undergo colonoscopy every 5 years, per current NCCN guidelines.   Gynecologic cancers: Average risk.     Recommendations:   Annual well woman exam for breast and gynecologic cancer screening.   Colonoscopy at least every 5 years.     Patient notification:   The Genetics Navigator will notify the patient of the results and direct her to the results letter in [employer].   Results letter: A letter will be sent to the patient via [employer] that contains the test results and recommendations and advises the patient to notify me of any changes to her personal or family history and the genetic testing results of any relatives. Since genetics is an evolving field, she is also advised to check in with me periodically to see if updated testing is indicated.   Results report: The Genetics Navigator will ensure that Frances receives a copy of her [facility] results report and that a copy is available in [employer].     JUAN Rodriguez, PAIsabelC  Hereditary/High Risk Clinic  Department of Hematology/Oncology  [facility]      CC: Katherine Scott MD

## 2022-12-08 NOTE — LETTER
2022    Frances Cardoza  [address] [facility] [address]     Dear Frances,    Below are the results from your recent cancer genetic testing. Please review and let us know if you have any questions or concerns. If you would like to schedule an in-person or a virtual appointment with me to further discuss the results, please message me through your [facility] patient portal or call [phone] to request a post-test genetic counseling appointment.    Katherine Scott MD will be notified of your results.  A copy of the [facility] results report is available to you in your [facility] medical record and the [facility] patient portal. If you have any difficulty accessing your report, please let our office know so we can mail you a hard copy.    Germline Genetic Testing  The [facility] Multi-Cancer DNA+RNA Panel analyzes 84 genes associated with hereditary cancer:  AIP, ALK, APC, ARNULFO, AXIN2, BAP1, BARD1, BLM, BMPR1A, BRCA1, BRCA2, BRIP1, CASR, CDC73, CDH1, CDK4, CDKN1B, CDKN1C, CDKN2A, CEBPA, CHEK2, CTNNA1, DICER1, DIS3L2, EGFR, EPCAM, FH, FLCN, GATA2, GPC3, GREM1, HOXB13, HRAS, KIT, MAX, MEN1, MET, MITF, MLH1, MSH2, MSH3, MSH6, MUTYH, NBN, NF1, NF2, NTHL1, PALB2, PDGFRA, PHOX2B, PMS2, POLD1, POLE, POT1, ITEYW5S, PTCH1, PTEN, RAD50, RAD51C, RAD51D, RB1, RECQL4, RET, RUNX1, SDHA, SDHAF2, SDHB, SDHC, SDHD, SMAD4, SMARCA4, SMARCB1, SMARCE1, STK11, SUFU, TERC, TERT, ASLZ445, TP53, TSC1, TSC2, VHL, WRN, WT1.    RESULT: Negative  Pathogenic (harmful) variants:  The test did not identify any genetic variants known to cause cancer.     Variants of uncertain significance:   The test did not identify any variants of uncertain significance.         Family History:  Sister: Breast cancer 56, BRCA+ (not confirmed)  Sister: Breast cancer 58, BRCA+ (not confirmed)  Nephew: Unspecified rare cancer at age 1  Mother: Colon ,  at 58  Maternal aunt: Breast cancer 25, BRCA+ (not confirmed)  Maternal uncle:  Unspecified cancer at 50, possibly prostate  PGM: Soft tissue cancer of upper arm              What do these results mean?    You tested negative for pathogenic (harmful) mutations in BRCA1, BRCA2 and the genes associated with hereditary breast, colon, prostate and other cancers.    BRCA: BRCA1/2 are associated with breast, ovarian, prostate and pancreatic cancer. Your sisters and maternal aunt have had breast cancer and are reported to have a BRCA mutation. This would mean that your mother also had the BRCA mutation. Since you did not inherit the BRCA mutation, your risk for breast cancer is lower than that of your sisters and maternal aunt. The BRCA genes are also associated with prostate cancer. If this is the cancer your uncle had at age 50, he may also have the BRCA mutation.   Colon cancer: Regarding your mother's history of metastatic colon cancer in her 50s, your negative result is considered an uninformative negative, as it is unknown if your mother had a genetic variant you did not inherit. Since there are no other colon cancers in the family and no cancers associated with any of the common hereditary colon cancer syndromes, it is quite possible that your mother had a random colon cancer.   Relatives: Anyone who is a blood relative of your mother and maternal aunts should undergo genetic testing for the familial BRCA gene mutation.   Anyone interested in pursuing genetic counseling/testing can contact the [facility] for an in-personal or virtual appointment in [place] or [place] by calling [phone]. The [place] provider can see virtual patients in [place] or [place]. The [place] providers can only see virtual patients located in [place]. Virtual patients must be able to access the virtual visit through the MyChart ([facility]) portal. Anyone who lives in another state or prefers to see someone in-person closer to home can visit [email or web address] to locate a local genetic  specialist in their area.  Cancer is usually caused by multiple risk factors, some of which are inherited. Even though your genetic testing was negative, you may still be at increased risk for the cancers in your family compared to someone who does not have a family history of these cancers.     Your cancer risks:  Breast cancer: Your lifetime risk for breast cancer as calculated today using the Tyrer-Cuzick model is 3.3% if your relatives have a BRCA1 mutation and 6.2% if they have a BRCA2 mutation. This places you in the average risk range. Annual mammogram, clinical breast exam and self-exams are recommended.   Colon cancer: You are considered to be at increased risk for colon cancer due to having a first-degree relative with colon cancer and should undergo colonoscopy every 5 years, per current NCCN guidelines.   Gynecologic cancers: Average risk.     Your screening recommendations:   Annual well woman exam for breast and gynecologic cancer screening.   Colonoscopy at least every 5 years.     Hereditary versus random/sporadic cancer:   Only a small percentage of cancers (5-10%) are hereditary, meaning they are caused by an inherited genetic variant (mutation). The remaining cancers (90-95%) are random or sporadic, caused often by a combination of risk factors including age, sex, race, physical characteristics and environmental and lifestyle factors (diet, obesity, smoking, lack of exercise, etc). Family members often share these risk factors resulting in multiple individuals with cancer. Even though your test was negative, you may still be at risk for certain cancers based on factors such as family history, genetic causes not evaluated with this test, or other lifestyle and environmental influences.     Cancer screening and risk reduction  It is important that you and your relatives establish care with a primary care provider (PCP), whom you see at least annually for routine health maintenance and guidance on  cancer screening and cancer risk reduction. Keep your PCP updated on your personal and family history. If you are not established with a PCP, please contact me so I can submit a referral.        What you can do to reduce your risk for cancer:   Avoid tobacco use; exercise; maintain a healthy weight; eat a diet rich in fruits, vegetables, and whole grains and low in saturated/trans fat, red meat, and processed meat; limit alcohol consumption; protect against sexually transmitted infections; protect against sun exposure, avoid tanning beds; and get regular cancer screenings as recommended by your healthcare team.    Follow-up    Please update me through [facility] with any changes to your personal or family history and with any relative's genetic testing results. I'm happy to review their results to determine how they might affect you and other family members. Genetics is an evolving field, so I invite you to contact me periodically to determine if any updated genetic testing is recommended for you or your relatives.     Sincerely,  JUAN Rodriguez PA-C  Physician Assistant, Hereditary/High Risk Clinic  [facility]    Phone:  [phone]

## 2022-12-09 ENCOUNTER — PATIENT MESSAGE (OUTPATIENT)
Dept: HEMATOLOGY/ONCOLOGY | Facility: CLINIC | Age: 62
End: 2022-12-09
Payer: COMMERCIAL

## 2023-01-09 ENCOUNTER — PATIENT OUTREACH (OUTPATIENT)
Dept: ADMINISTRATIVE | Facility: HOSPITAL | Age: 63
End: 2023-01-09
Payer: COMMERCIAL

## 2023-01-09 ENCOUNTER — PATIENT MESSAGE (OUTPATIENT)
Dept: ADMINISTRATIVE | Facility: HOSPITAL | Age: 63
End: 2023-01-09
Payer: COMMERCIAL

## 2023-01-09 NOTE — PROGRESS NOTES
Health Maintenance Due   Topic Date Due    COVID-19 Vaccine (1) Never done    Pneumococcal Vaccines (Age 0-64) (1 - PCV) Never done    TETANUS VACCINE  Never done    Colorectal Cancer Screening  Never done    Shingles Vaccine (1 of 2) Never done    Influenza Vaccine (1) Never done    Cervical Cancer Screening  12/12/2022     Portal message sent to inquire about last Colorectal Exam and possible need to schedule

## 2023-02-20 ENCOUNTER — OFFICE VISIT (OUTPATIENT)
Dept: PRIMARY CARE CLINIC | Facility: CLINIC | Age: 63
End: 2023-02-20
Payer: COMMERCIAL

## 2023-02-20 VITALS
HEART RATE: 78 BPM | BODY MASS INDEX: 51.95 KG/M2 | WEIGHT: 293 LBS | OXYGEN SATURATION: 97 % | SYSTOLIC BLOOD PRESSURE: 168 MMHG | DIASTOLIC BLOOD PRESSURE: 70 MMHG

## 2023-02-20 DIAGNOSIS — R73.01 IFG (IMPAIRED FASTING GLUCOSE): ICD-10-CM

## 2023-02-20 DIAGNOSIS — I77.9 DISORDER OF ARTERIES AND ARTERIOLES, UNSPECIFIED: ICD-10-CM

## 2023-02-20 DIAGNOSIS — E78.2 MIXED HYPERLIPIDEMIA: ICD-10-CM

## 2023-02-20 DIAGNOSIS — I25.10 CORONARY ARTERY DISEASE INVOLVING NATIVE CORONARY ARTERY OF NATIVE HEART WITHOUT ANGINA PECTORIS: ICD-10-CM

## 2023-02-20 DIAGNOSIS — I10 HYPERTENSION, BENIGN: ICD-10-CM

## 2023-02-20 DIAGNOSIS — E66.01 MORBID (SEVERE) OBESITY DUE TO EXCESS CALORIES: ICD-10-CM

## 2023-02-20 DIAGNOSIS — F33.8 SEASONAL AFFECTIVE DISORDER: ICD-10-CM

## 2023-02-20 DIAGNOSIS — R10.11 RIGHT UPPER QUADRANT ABDOMINAL PAIN: Primary | ICD-10-CM

## 2023-02-20 PROCEDURE — 1159F PR MEDICATION LIST DOCUMENTED IN MEDICAL RECORD: ICD-10-PCS | Mod: CPTII,S$GLB,, | Performed by: NURSE PRACTITIONER

## 2023-02-20 PROCEDURE — 1160F PR REVIEW ALL MEDS BY PRESCRIBER/CLIN PHARMACIST DOCUMENTED: ICD-10-PCS | Mod: CPTII,S$GLB,, | Performed by: NURSE PRACTITIONER

## 2023-02-20 PROCEDURE — 3078F PR MOST RECENT DIASTOLIC BLOOD PRESSURE < 80 MM HG: ICD-10-PCS | Mod: CPTII,S$GLB,, | Performed by: NURSE PRACTITIONER

## 2023-02-20 PROCEDURE — 3008F PR BODY MASS INDEX (BMI) DOCUMENTED: ICD-10-PCS | Mod: CPTII,S$GLB,, | Performed by: NURSE PRACTITIONER

## 2023-02-20 PROCEDURE — 3077F SYST BP >= 140 MM HG: CPT | Mod: CPTII,S$GLB,, | Performed by: NURSE PRACTITIONER

## 2023-02-20 PROCEDURE — 1159F MED LIST DOCD IN RCRD: CPT | Mod: CPTII,S$GLB,, | Performed by: NURSE PRACTITIONER

## 2023-02-20 PROCEDURE — 3008F BODY MASS INDEX DOCD: CPT | Mod: CPTII,S$GLB,, | Performed by: NURSE PRACTITIONER

## 2023-02-20 PROCEDURE — 99999 PR PBB SHADOW E&M-EST. PATIENT-LVL IV: CPT | Mod: PBBFAC,,, | Performed by: NURSE PRACTITIONER

## 2023-02-20 PROCEDURE — 1160F RVW MEDS BY RX/DR IN RCRD: CPT | Mod: CPTII,S$GLB,, | Performed by: NURSE PRACTITIONER

## 2023-02-20 PROCEDURE — 3044F PR MOST RECENT HEMOGLOBIN A1C LEVEL <7.0%: ICD-10-PCS | Mod: CPTII,S$GLB,, | Performed by: NURSE PRACTITIONER

## 2023-02-20 PROCEDURE — 99214 OFFICE O/P EST MOD 30 MIN: CPT | Mod: S$GLB,,, | Performed by: NURSE PRACTITIONER

## 2023-02-20 PROCEDURE — 3078F DIAST BP <80 MM HG: CPT | Mod: CPTII,S$GLB,, | Performed by: NURSE PRACTITIONER

## 2023-02-20 PROCEDURE — 3044F HG A1C LEVEL LT 7.0%: CPT | Mod: CPTII,S$GLB,, | Performed by: NURSE PRACTITIONER

## 2023-02-20 PROCEDURE — 99214 PR OFFICE/OUTPT VISIT, EST, LEVL IV, 30-39 MIN: ICD-10-PCS | Mod: S$GLB,,, | Performed by: NURSE PRACTITIONER

## 2023-02-20 PROCEDURE — 3077F PR MOST RECENT SYSTOLIC BLOOD PRESSURE >= 140 MM HG: ICD-10-PCS | Mod: CPTII,S$GLB,, | Performed by: NURSE PRACTITIONER

## 2023-02-20 PROCEDURE — 99999 PR PBB SHADOW E&M-EST. PATIENT-LVL IV: ICD-10-PCS | Mod: PBBFAC,,, | Performed by: NURSE PRACTITIONER

## 2023-02-20 NOTE — PROGRESS NOTES
[facility]    Chief Complaint      Chief Complaint   Patient presents with    Breast Pain     Left     History of Present Illness      Frances Cardoza is a 62 y.o. female patient of Dr. Salazar who presents today for c/o  pain across top of abd over the past few weeks. Sharp pain, sometimes has mid back pain. Still has gallbladder.     Pt is inquiring about diabetic injectables    Health Maintenance   Topic Date Due    TETANUS VACCINE  Never done    Mammogram  04/04/2023    Lipid Panel  07/05/2027    Hepatitis C Screening  Completed       Past Medical History:   Diagnosis Date    Genetic testing 12/2022    negative, [facility] 84-gene Multi-Cancer +RNA panel    GERD (gastroesophageal reflux disease)     High cholesterol     Hypertension     Plantar fasciitis     Post-surgical hypothyroidism 1997    Seasonal affective disorder 11/10/2022       Past Surgical History:   Procedure Laterality Date    HERNIA REPAIR  1997    TONSILLECTOMY  1977    TOTAL THYROIDECTOMY Bilateral 1997    due to rapid growth of a benign nodule    uterine ablation  2010    for menorrhagia       family history includes BRCA 1/2 in her maternal aunt, sister, and sister; Breast cancer (age of onset: 25) in her maternal aunt; Breast cancer (age of onset: 56) in her sister; Breast cancer (age of onset: 58) in her sister; Breast cancer (age of onset: 80) in an other family member; Cancer (age of onset: 1) in an other family member; Cancer (age of onset: 50) in her maternal uncle; Cancer (age of onset: 88) in her paternal grandmother; Colon cancer in her mother; Heart attack in her paternal uncle; Heart attacks under age 50 in her paternal grandfather; Heart attacks under age 50 (age of onset: 30) in her brother; Hypertension in her father; Stroke in her paternal uncle; Stroke (age of onset: 71) in her father.    Social History     Tobacco Use    Smoking status: Never    Smokeless tobacco: Never   Substance Use Topics     Alcohol use: Yes     Comment: occ    Drug use: No       Review of Systems   Constitutional:  Negative for chills and fever.   HENT:  Negative for congestion, sinus pain and sore throat.    Eyes:  Negative for blurred vision.   Respiratory:  Negative for cough, shortness of breath and wheezing.    Cardiovascular:  Negative for chest pain, palpitations and leg swelling.   Gastrointestinal:  Positive for abdominal pain. Negative for constipation, diarrhea, nausea and vomiting.   Genitourinary:  Negative for dysuria.   Musculoskeletal:  Positive for back pain. Negative for myalgias.   Skin:  Negative for rash.   Neurological:  Negative for dizziness, weakness and headaches.   Psychiatric/Behavioral:  Negative for depression. The patient is not nervous/anxious.       Outpatient Encounter Medications as of 2/20/2023   Medication Sig Dispense Refill    albuterol (VENTOLIN HFA) 90 mcg/actuation inhaler Inhale 2 puffs into the lungs every 6 (six) hours as needed for Wheezing. Rescue 18 g 6    cholecalciferol, vitamin D3, 1,250 mcg (50,000 unit) capsule Decara 1,250 mcg (50,000 unit) capsule   TK ONE C PO  ONCE A MONTH      clobetasoL-emollient 0.05 % Crea clobetasol-emollient 0.05 % topical cream   SABINO AA BID      DULoxetine (CYMBALTA) 20 MG capsule TAKE 2 CAPSULES(40 MG) BY MOUTH EVERY DAY 60 capsule 1    ergocalciferol (ERGOCALCIFEROL) 50,000 unit Cap Take 1 capsule (50,000 Units total) by mouth every 7 days. 12 capsule 1    ipratropium (ATROVENT) 0.02 % nebulizer solution Take 2.5 mLs (500 mcg total) by nebulization 4 (four) times daily. Rescue 75 mL 1    levothyroxine (SYNTHROID) 50 MCG tablet Take 1 tablet (50 mcg total) by mouth once daily. 90 tablet 1    metFORMIN (GLUCOPHAGE-XR) 500 MG ER 24hr tablet Take 1 tablet (500 mg total) by mouth once daily. 90 tablet 1    mupirocin (BACTROBAN) 2 % ointment Apply 1 g topically 2 (two) times daily.      pantoprazole (PROTONIX) 40 MG tablet Take 1 tablet (40 mg total) by mouth  2 (two) times daily. 180 tablet 1    rosuvastatin (CRESTOR) 40 MG Tab Take 1 tablet (40 mg total) by mouth once daily. 90 tablet 1    BYSTOLIC 5 mg Tab Take 1 tablet (5 mg total) by mouth 2 (two) times a day. (Patient not taking: Reported on 2/20/2023) 180 tablet 1     No facility-administered encounter medications on file as of 2/20/2023.        Review of patient's allergies indicates:   Allergen Reactions    Coly-mycin m [colistimethate sodium]     Latex, natural rubber Rash       Physical Exam      Vital Signs  Pulse: 78  SpO2: 97 %  BP: (!) 168/70  Height and Weight  Weight: (!) 141.6 kg (312 lb 2.7 oz)    Physical Exam  Vitals and nursing note reviewed.   Constitutional:       General: She is not in acute distress.     Appearance: Normal appearance. She is ill-appearing.   HENT:      Head: Normocephalic and atraumatic.   Cardiovascular:      Rate and Rhythm: Normal rate and regular rhythm.   Pulmonary:      Effort: Pulmonary effort is normal.      Breath sounds: Normal breath sounds.   Abdominal:      General: There is no distension.      Palpations: Abdomen is soft.      Tenderness: There is abdominal tenderness.   Neurological:      General: No focal deficit present.      Mental Status: She is alert and oriented to person, place, and time.   Psychiatric:         Mood and Affect: Mood normal.         Behavior: Behavior normal.         Thought Content: Thought content normal.         Judgment: Judgment normal.        Laboratory:  CBC:  Lab Results   Component Value Date    WBC 8.74 02/22/2023    RBC 5.07 02/22/2023    HGB 13.2 02/22/2023    HCT 42.8 02/22/2023     02/22/2023    MCV 84 02/22/2023    MCH 26.0 (L) 02/22/2023    MCHC 30.8 (L) 02/22/2023    MCHC 32.5 02/18/2006     CMP:  Lab Results   Component Value Date     (H) 02/22/2023    CALCIUM 9.7 02/22/2023    ALBUMIN 3.5 02/22/2023    PROT 7.2 02/22/2023     02/22/2023    K 4.1 02/22/2023    CO2 27 02/22/2023     02/22/2023    BUN  13 02/22/2023    ALKPHOS 99 02/22/2023    ALT 27 02/22/2023    AST 21 02/22/2023    BILITOT 0.7 02/22/2023    BILITOT 0.7 03/22/2022    BILITOT 0.6 02/18/2006     URINALYSIS:  No results found for: COLORU, CLARITYU, SPECGRAV, PHUR, PROTEINUA, GLUCOSEU, BILIRUBINCON, BLOODU, WBCU, RBCU, BACTERIA, MUCUS, NITRITE, LEUKOCYTESUR, UROBILINOGEN, HYALINECASTS   LIPIDS:  Lab Results   Component Value Date    TSH 2.0 02/18/2006    HDL 66 07/05/2022    HDL 67.0 02/18/2006    CHOL 180 07/05/2022    CHOL 205 (H) 02/18/2006    TRIG 151 (H) 07/05/2022    TRIG 71 02/18/2006    LDLCALC 90 07/05/2022    LDLCALC 123.8 02/18/2006    CHOLHDL 2.73 07/05/2022    CHOLHDL 32.7 02/18/2006    TOTALCHOLEST 3.1 02/18/2006     TSH:  Lab Results   Component Value Date    TSH 2.0 02/18/2006     A1C:  Lab Results   Component Value Date    HGBA1C 6.0 (H) 02/22/2023    HGBA1C 5.6 03/22/2022         Assessment/Plan     Frances Cardoza is a 62 y.o.female with:    Right upper quadrant abdominal pain  -     US Abdomen Limited; Future; Expected date: 02/20/2023  -     Comprehensive Metabolic Panel; Future; Expected date: 02/20/2023  -     Hemoglobin A1C; Future; Expected date: 02/20/2023  -     CBC Auto Differential; Future; Expected date: 02/20/2023    IFG (impaired fasting glucose)  -     Comprehensive Metabolic Panel; Future; Expected date: 02/20/2023  -     Hemoglobin A1C; Future; Expected date: 02/20/2023  -     CBC Auto Differential; Future; Expected date: 02/20/2023    Hypertension, benign    Mixed hyperlipidemia    Coronary artery disease involving native coronary artery of native heart without angina pectoris    Morbid (severe) obesity due to excess calories    Disorder of arteries and arterioles, unspecified    Seasonal affective disorder          Health Maintenance Due   Topic Date Due    COVID-19 Vaccine (1) Never done    Pneumococcal Vaccines (Age 0-64) (1 - PCV) Never done    TETANUS VACCINE  Never done    Colorectal Cancer  Screening  Never done    Shingles Vaccine (1 of 2) Never done    Cervical Cancer Screening  12/12/2022    Mammogram  04/04/2023        I spent 35 minutes on the day of this encounter for preparing for, evaluating, treating, and managing this patient.      -Continue current medications and maintain follow up with specialists.  Return to clinic in 1 year or sooner for any concerns   No follow-ups on file.       BARBARA RendonC  [facility]

## 2023-02-22 ENCOUNTER — PATIENT MESSAGE (OUTPATIENT)
Dept: HEMATOLOGY/ONCOLOGY | Facility: CLINIC | Age: 63
End: 2023-02-22
Payer: COMMERCIAL

## 2023-02-22 ENCOUNTER — HOSPITAL ENCOUNTER (OUTPATIENT)
Dept: RADIOLOGY | Facility: HOSPITAL | Age: 63
Discharge: HOME | End: 2023-02-22
Attending: NURSE PRACTITIONER
Payer: COMMERCIAL

## 2023-02-22 DIAGNOSIS — R10.11 RIGHT UPPER QUADRANT ABDOMINAL PAIN: ICD-10-CM

## 2023-02-22 PROCEDURE — 76705 US ABDOMEN LIMITED: ICD-10-PCS | Mod: 26,,, | Performed by: STUDENT IN AN ORGANIZED HEALTH CARE EDUCATION/TRAINING PROGRAM

## 2023-02-22 PROCEDURE — 76705 ECHO EXAM OF ABDOMEN: CPT | Mod: 26,,, | Performed by: STUDENT IN AN ORGANIZED HEALTH CARE EDUCATION/TRAINING PROGRAM

## 2023-02-22 PROCEDURE — 76705 ECHO EXAM OF ABDOMEN: CPT | Mod: TC

## 2023-02-24 ENCOUNTER — PATIENT MESSAGE (OUTPATIENT)
Dept: PRIMARY CARE CLINIC | Facility: CLINIC | Age: 63
End: 2023-02-24
Payer: COMMERCIAL

## 2023-02-26 PROBLEM — I77.9 DISORDER OF ARTERIES AND ARTERIOLES, UNSPECIFIED: Status: ACTIVE | Noted: 2023-02-26

## 2023-02-26 PROBLEM — E66.01 MORBID (SEVERE) OBESITY DUE TO EXCESS CALORIES: Status: ACTIVE | Noted: 2023-02-26

## 2023-03-01 ENCOUNTER — TELEPHONE (OUTPATIENT)
Dept: PRIMARY CARE CLINIC | Facility: CLINIC | Age: 63
End: 2023-03-01
Payer: COMMERCIAL

## 2023-03-09 ENCOUNTER — PATIENT MESSAGE (OUTPATIENT)
Dept: PRIMARY CARE CLINIC | Facility: CLINIC | Age: 63
End: 2023-03-09
Payer: COMMERCIAL

## 2023-03-22 ENCOUNTER — OFFICE VISIT (OUTPATIENT)
Dept: PRIMARY CARE CLINIC | Facility: CLINIC | Age: 63
End: 2023-03-22
Payer: COMMERCIAL

## 2023-03-22 VITALS
SYSTOLIC BLOOD PRESSURE: 138 MMHG | HEIGHT: 65 IN | HEART RATE: 76 BPM | BODY MASS INDEX: 48.82 KG/M2 | OXYGEN SATURATION: 95 % | DIASTOLIC BLOOD PRESSURE: 86 MMHG | WEIGHT: 293 LBS

## 2023-03-22 DIAGNOSIS — Z12.31 ENCOUNTER FOR SCREENING MAMMOGRAM FOR MALIGNANT NEOPLASM OF BREAST: ICD-10-CM

## 2023-03-22 DIAGNOSIS — K74.60 HEPATIC CIRRHOSIS, UNSPECIFIED HEPATIC CIRRHOSIS TYPE, UNSPECIFIED WHETHER ASCITES PRESENT: ICD-10-CM

## 2023-03-22 DIAGNOSIS — I10 HYPERTENSION, BENIGN: Primary | ICD-10-CM

## 2023-03-22 PROCEDURE — 3079F DIAST BP 80-89 MM HG: CPT | Mod: CPTII,S$GLB,, | Performed by: INTERNAL MEDICINE

## 2023-03-22 PROCEDURE — 99999 PR PBB SHADOW E&M-EST. PATIENT-LVL IV: CPT | Mod: PBBFAC,,, | Performed by: INTERNAL MEDICINE

## 2023-03-22 PROCEDURE — 1159F MED LIST DOCD IN RCRD: CPT | Mod: CPTII,S$GLB,, | Performed by: INTERNAL MEDICINE

## 2023-03-22 PROCEDURE — 3008F BODY MASS INDEX DOCD: CPT | Mod: CPTII,S$GLB,, | Performed by: INTERNAL MEDICINE

## 2023-03-22 PROCEDURE — 3075F SYST BP GE 130 - 139MM HG: CPT | Mod: CPTII,S$GLB,, | Performed by: INTERNAL MEDICINE

## 2023-03-22 PROCEDURE — 99214 OFFICE O/P EST MOD 30 MIN: CPT | Mod: S$GLB,,, | Performed by: INTERNAL MEDICINE

## 2023-03-22 PROCEDURE — 3044F HG A1C LEVEL LT 7.0%: CPT | Mod: CPTII,S$GLB,, | Performed by: INTERNAL MEDICINE

## 2023-03-22 PROCEDURE — 3008F PR BODY MASS INDEX (BMI) DOCUMENTED: ICD-10-PCS | Mod: CPTII,S$GLB,, | Performed by: INTERNAL MEDICINE

## 2023-03-22 PROCEDURE — 99214 PR OFFICE/OUTPT VISIT, EST, LEVL IV, 30-39 MIN: ICD-10-PCS | Mod: S$GLB,,, | Performed by: INTERNAL MEDICINE

## 2023-03-22 PROCEDURE — 99999 PR PBB SHADOW E&M-EST. PATIENT-LVL IV: ICD-10-PCS | Mod: PBBFAC,,, | Performed by: INTERNAL MEDICINE

## 2023-03-22 PROCEDURE — 3079F PR MOST RECENT DIASTOLIC BLOOD PRESSURE 80-89 MM HG: ICD-10-PCS | Mod: CPTII,S$GLB,, | Performed by: INTERNAL MEDICINE

## 2023-03-22 PROCEDURE — 4010F PR ACE/ARB THEARPY RXD/TAKEN: ICD-10-PCS | Mod: CPTII,S$GLB,, | Performed by: INTERNAL MEDICINE

## 2023-03-22 PROCEDURE — 1159F PR MEDICATION LIST DOCUMENTED IN MEDICAL RECORD: ICD-10-PCS | Mod: CPTII,S$GLB,, | Performed by: INTERNAL MEDICINE

## 2023-03-22 PROCEDURE — 3044F PR MOST RECENT HEMOGLOBIN A1C LEVEL <7.0%: ICD-10-PCS | Mod: CPTII,S$GLB,, | Performed by: INTERNAL MEDICINE

## 2023-03-22 PROCEDURE — 4010F ACE/ARB THERAPY RXD/TAKEN: CPT | Mod: CPTII,S$GLB,, | Performed by: INTERNAL MEDICINE

## 2023-03-22 PROCEDURE — 3075F PR MOST RECENT SYSTOLIC BLOOD PRESS GE 130-139MM HG: ICD-10-PCS | Mod: CPTII,S$GLB,, | Performed by: INTERNAL MEDICINE

## 2023-03-22 RX ORDER — LISINOPRIL 20 MG/1
20 TABLET ORAL DAILY
Qty: 90 TABLET | Refills: 3 | Status: SHIPPED | OUTPATIENT
Start: 2023-03-22 | End: 2023-10-20

## 2023-03-23 ENCOUNTER — PATIENT MESSAGE (OUTPATIENT)
Dept: PRIMARY CARE CLINIC | Facility: CLINIC | Age: 63
End: 2023-03-23
Payer: COMMERCIAL

## 2023-03-23 NOTE — TELEPHONE ENCOUNTER
Pt was seen yesterday and was advised that blood work was going to ordered and a follow up visit needed to be scheduled.     I do not see any lab orders on file, when would you like for her to return?

## 2023-03-27 ENCOUNTER — PATIENT MESSAGE (OUTPATIENT)
Dept: PRIMARY CARE CLINIC | Facility: CLINIC | Age: 63
End: 2023-03-27
Payer: COMMERCIAL

## 2023-04-03 ENCOUNTER — PATIENT MESSAGE (OUTPATIENT)
Dept: ADMINISTRATIVE | Facility: HOSPITAL | Age: 63
End: 2023-04-03
Payer: COMMERCIAL

## 2023-04-04 ENCOUNTER — LAB VISIT (OUTPATIENT)
Dept: LAB | Facility: HOSPITAL | Age: 63
End: 2023-04-04
Attending: INTERNAL MEDICINE
Payer: COMMERCIAL

## 2023-04-04 DIAGNOSIS — K74.60 HEPATIC CIRRHOSIS, UNSPECIFIED HEPATIC CIRRHOSIS TYPE, UNSPECIFIED WHETHER ASCITES PRESENT: ICD-10-CM

## 2023-04-04 LAB
APTT PPP: 25.4 SEC (ref 21–32)
CERULOPLASMIN SERPL-MCNC: 36 MG/DL (ref 15–45)
FERRITIN SERPL-MCNC: 20 NG/ML (ref 20–300)
HAV IGM SERPL QL IA: NORMAL
HBV CORE IGM SERPL QL IA: NORMAL
HBV SURFACE AG SERPL QL IA: NORMAL
HCV AB SERPL QL IA: NORMAL
INR PPP: 1 (ref 0.8–1.2)
PROTHROMBIN TIME: 10.3 SEC (ref 9–12.5)

## 2023-04-04 PROCEDURE — 85610 PROTHROMBIN TIME: CPT | Performed by: INTERNAL MEDICINE

## 2023-04-04 PROCEDURE — 86015 ACTIN ANTIBODY EACH: CPT | Performed by: INTERNAL MEDICINE

## 2023-04-04 PROCEDURE — 86036 ANCA SCREEN EACH ANTIBODY: CPT | Performed by: INTERNAL MEDICINE

## 2023-04-04 PROCEDURE — 86381 MITOCHONDRIAL ANTIBODY EACH: CPT | Performed by: INTERNAL MEDICINE

## 2023-04-04 PROCEDURE — 82728 ASSAY OF FERRITIN: CPT | Performed by: INTERNAL MEDICINE

## 2023-04-04 PROCEDURE — 80074 ACUTE HEPATITIS PANEL: CPT | Performed by: INTERNAL MEDICINE

## 2023-04-04 PROCEDURE — 85730 THROMBOPLASTIN TIME PARTIAL: CPT | Performed by: INTERNAL MEDICINE

## 2023-04-04 PROCEDURE — 82787 IGG 1 2 3 OR 4 EACH: CPT | Performed by: INTERNAL MEDICINE

## 2023-04-04 PROCEDURE — 82390 ASSAY OF CERULOPLASMIN: CPT | Performed by: INTERNAL MEDICINE

## 2023-04-04 PROCEDURE — 36415 COLL VENOUS BLD VENIPUNCTURE: CPT | Performed by: INTERNAL MEDICINE

## 2023-04-05 ENCOUNTER — PATIENT MESSAGE (OUTPATIENT)
Dept: PRIMARY CARE CLINIC | Facility: CLINIC | Age: 63
End: 2023-04-05
Payer: COMMERCIAL

## 2023-04-05 LAB
MITOCHONDRIA AB TITR SER IF: NORMAL {TITER}
SMOOTH MUSCLE AB TITR SER IF: NORMAL {TITER}

## 2023-04-07 LAB
ANCA AB TITR SER IF: NORMAL TITER
IGG4 SER-MCNC: 39 MG/DL (ref 4–86)
P-ANCA TITR SER IF: NORMAL TITER

## 2023-04-21 ENCOUNTER — PATIENT MESSAGE (OUTPATIENT)
Dept: ADMINISTRATIVE | Facility: HOSPITAL | Age: 63
End: 2023-04-21
Payer: COMMERCIAL

## 2023-06-23 DIAGNOSIS — F33.8 SEASONAL AFFECTIVE DISORDER: ICD-10-CM

## 2023-06-23 DIAGNOSIS — K21.9 GASTROESOPHAGEAL REFLUX DISEASE, UNSPECIFIED WHETHER ESOPHAGITIS PRESENT: ICD-10-CM

## 2023-06-23 NOTE — TELEPHONE ENCOUNTER
Care Due:                  Date            Visit Type   Department     Provider  --------------------------------------------------------------------------------                                MYCHART                              FOLLOWUP/OF  Last Visit: 03-      [facility]ICE VISIT   None Found     Katherine Scott  Next Visit: None Scheduled  None         None Found                                                            Last  Test          Frequency    Reason                     Performed    Due Date  --------------------------------------------------------------------------------    HBA1C.......  6 months...  metFORMIN................  02- 08-    Lipid Panel.  12 months..  rosuvastatin.............  Not Found    Overdue    TSH.........  12 months..  levothyroxine............  Not Found    Overdue    [employer] Embedded Care Due Messages. Reference number: [id number].   6/23/2023 12:46:21 PM CDT

## 2023-06-25 RX ORDER — PANTOPRAZOLE SODIUM 40 MG/1
40 TABLET, DELAYED RELEASE ORAL 2 TIMES DAILY
Qty: 180 TABLET | Refills: 1 | Status: SHIPPED | OUTPATIENT
Start: 2023-06-25

## 2023-06-25 RX ORDER — DULOXETIN HYDROCHLORIDE 20 MG/1
40 CAPSULE, DELAYED RELEASE ORAL DAILY
Qty: 60 CAPSULE | Refills: 1 | Status: SHIPPED | OUTPATIENT
Start: 2023-06-25 | End: 2023-10-03 | Stop reason: SDUPTHER

## 2023-08-09 ENCOUNTER — TELEPHONE (OUTPATIENT)
Dept: PRIMARY CARE CLINIC | Facility: CLINIC | Age: 63
End: 2023-08-09
Payer: COMMERCIAL

## 2023-08-09 NOTE — TELEPHONE ENCOUNTER
----- Message from Sujey Aguilar sent at 8/9/2023  2:29 PM CDT -----  Type:  Patient Returning Call    Who Called:pt   Who Left Message for Patient:  Does the patient know what this is regarding?:appt   Would the patient rather a call back or a response via [facility]? Call   Best Call Back Number:[phone]  Additional Information: states that she has gotten better and states that she would like to give her appt to her  MRN: [id number] Ron Araujo as he has gotten worse.

## 2023-09-10 DIAGNOSIS — J45.30 MILD PERSISTENT ASTHMA WITHOUT COMPLICATION: ICD-10-CM

## 2023-09-10 NOTE — TELEPHONE ENCOUNTER
Care Due:                  Date            Visit Type   Department     Provider  --------------------------------------------------------------------------------                                MYCHART                              FOLLOWUP/OF  [facility]  Last Visit: 03-      FICE VISIT   CARE           Katherine Scott  Next Visit: None Scheduled  None         None Found                                                            Last  Test          Frequency    Reason                     Performed    Due Date  --------------------------------------------------------------------------------    HBA1C.......  6 months...  metFORMIN................  02- 08-    Lipid Panel.  12 months..  rosuvastatin.............  Not Found    Overdue    TSH.........  12 months..  levothyroxine............  Not Found    Overdue    [employer] Embedded Care Due Messages. Reference number: [id number].   9/10/2023 5:24:31 AM CDT

## 2023-09-11 RX ORDER — MONTELUKAST SODIUM 10 MG/1
TABLET ORAL
Qty: 90 TABLET | Refills: 1 | Status: SHIPPED | OUTPATIENT
Start: 2023-09-11 | End: 2023-12-22

## 2023-09-11 NOTE — TELEPHONE ENCOUNTER
Refill Routing Note     Refill Routing Note   Medication(s) are not appropriate for processing by [facility] for the following reason(s):      No active prescription written by provider  discontinued 3/22/23 for patient no longer taking; patient filled at pharmacy in June 2023 and rx refill is being requested; deferred to pcp for review    [facility] action(s):  Defer Care Due:  Labs due     Medication Therapy Plan: discontinued 3/22/23 for patient no longer taking; patient filled at pharmacy in June 2023 and rx refill is being requested; deferred to pcp for review      Appointments  past 12m or future 3m with PCP    Date Provider   Last Visit   3/22/2023 Katherine Scott MD   Next Visit   Visit date not found Katherine Scott MD   ED visits in past 90 days: 0        Note composed:5:10 AM 09/11/2023

## 2023-09-24 DIAGNOSIS — E89.0 POST-SURGICAL HYPOTHYROIDISM: ICD-10-CM

## 2023-09-24 DIAGNOSIS — R73.03 PREDIABETES: ICD-10-CM

## 2023-09-24 NOTE — TELEPHONE ENCOUNTER
No care due was identified.  [facility] Embedded Care Due Messages. Reference number: [id number].   9/24/2023 6:42:07 AM CDT

## 2023-09-25 RX ORDER — LEVOTHYROXINE SODIUM 50 UG/1
50 TABLET ORAL
Qty: 90 TABLET | Refills: 0 | Status: SHIPPED | OUTPATIENT
Start: 2023-09-25 | End: 2023-10-20 | Stop reason: SDUPTHER

## 2023-09-25 RX ORDER — METFORMIN HYDROCHLORIDE 500 MG/1
TABLET, EXTENDED RELEASE ORAL
Qty: 90 TABLET | Refills: 0 | Status: SHIPPED | OUTPATIENT
Start: 2023-09-25 | End: 2023-10-20

## 2023-09-25 NOTE — TELEPHONE ENCOUNTER
Refill Routing Note   Medication(s) are not appropriate for processing by [facility] for the following reason(s):      Required labs outdated    [facility] action(s):  Defer Care Due:  None identified              Appointments  past 12m or future 3m with PCP    Date Provider   Last Visit   3/22/2023 Katherine Scott MD   Next Visit   Visit date not found Katherine Scott MD   ED visits in past 90 days: 0        Note composed:11:30 AM 09/25/2023

## 2023-10-03 DIAGNOSIS — F33.8 SEASONAL AFFECTIVE DISORDER: ICD-10-CM

## 2023-10-03 RX ORDER — DULOXETIN HYDROCHLORIDE 20 MG/1
40 CAPSULE, DELAYED RELEASE ORAL DAILY
Qty: 60 CAPSULE | Refills: 1 | Status: SHIPPED | OUTPATIENT
Start: 2023-10-03 | End: 2024-03-10 | Stop reason: SDUPTHER

## 2023-10-03 NOTE — TELEPHONE ENCOUNTER
No care due was identified.  [facility] Embedded Care Due Messages. Reference number: [id number].   10/03/2023 3:31:42 PM CDT

## 2023-10-20 ENCOUNTER — OFFICE VISIT (OUTPATIENT)
Dept: PRIMARY CARE CLINIC | Facility: CLINIC | Age: 63
End: 2023-10-20
Payer: COMMERCIAL

## 2023-10-20 VITALS
OXYGEN SATURATION: 98 % | HEIGHT: 65 IN | BODY MASS INDEX: 48.82 KG/M2 | SYSTOLIC BLOOD PRESSURE: 142 MMHG | WEIGHT: 293 LBS | DIASTOLIC BLOOD PRESSURE: 84 MMHG | HEART RATE: 90 BPM

## 2023-10-20 DIAGNOSIS — J45.30 MILD PERSISTENT ASTHMA WITHOUT COMPLICATION: ICD-10-CM

## 2023-10-20 DIAGNOSIS — I25.10 CORONARY ARTERY DISEASE INVOLVING NATIVE CORONARY ARTERY OF NATIVE HEART WITHOUT ANGINA PECTORIS: ICD-10-CM

## 2023-10-20 DIAGNOSIS — E78.2 MIXED HYPERLIPIDEMIA: ICD-10-CM

## 2023-10-20 DIAGNOSIS — J40 BRONCHITIS: ICD-10-CM

## 2023-10-20 DIAGNOSIS — F43.9 STRESS AT HOME: Primary | ICD-10-CM

## 2023-10-20 DIAGNOSIS — E89.0 POST-SURGICAL HYPOTHYROIDISM: ICD-10-CM

## 2023-10-20 DIAGNOSIS — R73.03 PRE-DIABETES: ICD-10-CM

## 2023-10-20 DIAGNOSIS — I65.29 CAROTID ATHEROSCLEROSIS, UNSPECIFIED LATERALITY: ICD-10-CM

## 2023-10-20 DIAGNOSIS — J01.00 ACUTE NON-RECURRENT MAXILLARY SINUSITIS: ICD-10-CM

## 2023-10-20 DIAGNOSIS — E66.01 CLASS 3 SEVERE OBESITY DUE TO EXCESS CALORIES WITH BODY MASS INDEX (BMI) OF 50.0 TO 59.9 IN ADULT, UNSPECIFIED WHETHER SERIOUS COMORBIDITY PRESENT: ICD-10-CM

## 2023-10-20 DIAGNOSIS — E55.9 VITAMIN D DEFICIENCY: ICD-10-CM

## 2023-10-20 DIAGNOSIS — F41.9 ANXIETY: ICD-10-CM

## 2023-10-20 PROCEDURE — 4010F ACE/ARB THERAPY RXD/TAKEN: CPT | Mod: CPTII,S$GLB,, | Performed by: NURSE PRACTITIONER

## 2023-10-20 PROCEDURE — 3044F PR MOST RECENT HEMOGLOBIN A1C LEVEL <7.0%: ICD-10-PCS | Mod: CPTII,S$GLB,, | Performed by: NURSE PRACTITIONER

## 2023-10-20 PROCEDURE — 1159F MED LIST DOCD IN RCRD: CPT | Mod: CPTII,S$GLB,, | Performed by: NURSE PRACTITIONER

## 2023-10-20 PROCEDURE — 1159F PR MEDICATION LIST DOCUMENTED IN MEDICAL RECORD: ICD-10-PCS | Mod: CPTII,S$GLB,, | Performed by: NURSE PRACTITIONER

## 2023-10-20 PROCEDURE — 99214 OFFICE O/P EST MOD 30 MIN: CPT | Mod: S$GLB,,, | Performed by: NURSE PRACTITIONER

## 2023-10-20 PROCEDURE — 4010F PR ACE/ARB THEARPY RXD/TAKEN: ICD-10-PCS | Mod: CPTII,S$GLB,, | Performed by: NURSE PRACTITIONER

## 2023-10-20 PROCEDURE — 99999 PR PBB SHADOW E&M-EST. PATIENT-LVL III: ICD-10-PCS | Mod: PBBFAC,,, | Performed by: NURSE PRACTITIONER

## 2023-10-20 PROCEDURE — 3008F PR BODY MASS INDEX (BMI) DOCUMENTED: ICD-10-PCS | Mod: CPTII,S$GLB,, | Performed by: NURSE PRACTITIONER

## 2023-10-20 PROCEDURE — 99214 PR OFFICE/OUTPT VISIT, EST, LEVL IV, 30-39 MIN: ICD-10-PCS | Mod: S$GLB,,, | Performed by: NURSE PRACTITIONER

## 2023-10-20 PROCEDURE — 3079F DIAST BP 80-89 MM HG: CPT | Mod: CPTII,S$GLB,, | Performed by: NURSE PRACTITIONER

## 2023-10-20 PROCEDURE — 3077F SYST BP >= 140 MM HG: CPT | Mod: CPTII,S$GLB,, | Performed by: NURSE PRACTITIONER

## 2023-10-20 PROCEDURE — 3008F BODY MASS INDEX DOCD: CPT | Mod: CPTII,S$GLB,, | Performed by: NURSE PRACTITIONER

## 2023-10-20 PROCEDURE — 3077F PR MOST RECENT SYSTOLIC BLOOD PRESSURE >= 140 MM HG: ICD-10-PCS | Mod: CPTII,S$GLB,, | Performed by: NURSE PRACTITIONER

## 2023-10-20 PROCEDURE — 99999 PR PBB SHADOW E&M-EST. PATIENT-LVL III: CPT | Mod: PBBFAC,,, | Performed by: NURSE PRACTITIONER

## 2023-10-20 PROCEDURE — 1160F PR REVIEW ALL MEDS BY PRESCRIBER/CLIN PHARMACIST DOCUMENTED: ICD-10-PCS | Mod: CPTII,S$GLB,, | Performed by: NURSE PRACTITIONER

## 2023-10-20 PROCEDURE — 3079F PR MOST RECENT DIASTOLIC BLOOD PRESSURE 80-89 MM HG: ICD-10-PCS | Mod: CPTII,S$GLB,, | Performed by: NURSE PRACTITIONER

## 2023-10-20 PROCEDURE — 3044F HG A1C LEVEL LT 7.0%: CPT | Mod: CPTII,S$GLB,, | Performed by: NURSE PRACTITIONER

## 2023-10-20 PROCEDURE — 1160F RVW MEDS BY RX/DR IN RCRD: CPT | Mod: CPTII,S$GLB,, | Performed by: NURSE PRACTITIONER

## 2023-10-20 RX ORDER — ERGOCALCIFEROL 1.25 MG/1
50000 CAPSULE ORAL
Qty: 24 CAPSULE | Refills: 1 | Status: SHIPPED | OUTPATIENT
Start: 2023-10-20

## 2023-10-20 RX ORDER — NEBIVOLOL 10 MG/1
10 TABLET ORAL DAILY
COMMUNITY
Start: 2023-07-27

## 2023-10-20 RX ORDER — ALPRAZOLAM 0.25 MG/1
0.25 TABLET ORAL DAILY PRN
Qty: 90 TABLET | Refills: 0 | Status: SHIPPED | OUTPATIENT
Start: 2023-10-20 | End: 2023-12-22

## 2023-10-20 RX ORDER — ALBUTEROL SULFATE 90 UG/1
2 AEROSOL, METERED RESPIRATORY (INHALATION) EVERY 6 HOURS PRN
Qty: 18 G | Refills: 6 | Status: SHIPPED | OUTPATIENT
Start: 2023-10-20 | End: 2024-10-19

## 2023-10-20 RX ORDER — ROSUVASTATIN CALCIUM 40 MG/1
40 TABLET, COATED ORAL DAILY
Qty: 90 TABLET | Refills: 1 | Status: SHIPPED | OUTPATIENT
Start: 2023-10-20

## 2023-10-20 RX ORDER — LEVOTHYROXINE SODIUM 50 UG/1
50 TABLET ORAL
Qty: 90 TABLET | Refills: 1 | Status: SHIPPED | OUTPATIENT
Start: 2023-10-20 | End: 2024-01-18 | Stop reason: SDUPTHER

## 2023-10-20 NOTE — PROGRESS NOTES
[facility] Note    Chief Complaint      Chief Complaint   Patient presents with    Medication Refill     History of Present Illness      Frances Newell Donnajosé miguel is a 63 y.o. female patient of Dr. Salazar with chronic conditions of seasonal affective disorder, asthma, carotid atherosclerosis, CAD, HTN, mixed HLD, morbid obesity, hypothyroid, fibromyalgia who presents today for medication refills.     Xanax- seasonal affective disorder, stress at home  Vitd d def- stable on vit d  Hypothyroid- stable on synthroid  HLD- stable on crestor  Obesity- stable on ozempic        Health Maintenance   Topic Date Due    TETANUS VACCINE  Never done    Aspirin/Antiplatelet Therapy  Never done    Shingles Vaccine (1 of 2) Never done    Mammogram  04/25/2024    Lipid Panel  07/05/2027    Colorectal Cancer Screening  04/28/2033    Hepatitis C Screening  Completed       Past Medical History:   Diagnosis Date    Genetic testing 12/2022    negative, [facility] 84-gene Multi-Cancer +RNA panel    GERD (gastroesophageal reflux disease)     High cholesterol     Hypertension     Plantar fasciitis     Post-surgical hypothyroidism 1997    Seasonal affective disorder 11/10/2022       Past Surgical History:   Procedure Laterality Date    HERNIA REPAIR  1997    TONSILLECTOMY  1977    TOTAL THYROIDECTOMY Bilateral 1997    due to rapid growth of a benign nodule    uterine ablation  2010    for menorrhagia       family history includes BRCA 1/2 in her maternal aunt, sister, and sister; Breast cancer (age of onset: 25) in her maternal aunt; Breast cancer (age of onset: 56) in her sister; Breast cancer (age of onset: 58) in her sister; Breast cancer (age of onset: 80) in an other family member; Cancer (age of onset: 1) in an other family member; Cancer (age of onset: 50) in her maternal uncle; Cancer (age of onset: 88) in her paternal grandmother; Colon cancer in her mother; Heart attack in her paternal uncle; Heart attacks under age  50 in her paternal grandfather; Heart attacks under age 50 (age of onset: 30) in her brother; Hypertension in her father; Stroke in her paternal uncle; Stroke (age of onset: 71) in her father.    Social History     Tobacco Use    Smoking status: Never    Smokeless tobacco: Never   Substance Use Topics    Alcohol use: Yes     Comment: occ    Drug use: No       Review of Systems   Constitutional:  Negative for chills and fever.   HENT:  Negative for congestion, sinus pain and sore throat.    Eyes:  Negative for blurred vision.   Respiratory:  Negative for cough, shortness of breath and wheezing.    Cardiovascular:  Negative for chest pain, palpitations and leg swelling.   Gastrointestinal:  Negative for abdominal pain, constipation, diarrhea, nausea and vomiting.   Genitourinary:  Negative for dysuria.   Musculoskeletal:  Negative for myalgias.   Skin:  Negative for rash.   Neurological:  Negative for dizziness, weakness and headaches.   Psychiatric/Behavioral:  Negative for depression. The patient is not nervous/anxious.         Outpatient Encounter Medications as of 10/20/2023   Medication Sig Dispense Refill    clobetasoL-emollient 0.05 % Crea clobetasol-emollient 0.05 % topical cream   SABINO AA BID      DULoxetine (CYMBALTA) 20 MG capsule Take 2 capsules (40 mg total) by mouth once daily. 60 capsule 1    montelukast (SINGULAIR) 10 mg tablet TAKE 1 TABLET(10 MG) BY MOUTH EVERY EVENING 90 tablet 1    mupirocin (BACTROBAN) 2 % ointment Apply 1 g topically 2 (two) times daily.      nebivoloL (BYSTOLIC) 10 MG Tab Take 10 mg by mouth once daily.      pantoprazole (PROTONIX) 40 MG tablet Take 1 tablet (40 mg total) by mouth 2 (two) times daily. 180 tablet 1    SEMAGLUTIDE SUBQ Inject into the skin.      [DISCONTINUED] albuterol (VENTOLIN HFA) 90 mcg/actuation inhaler Inhale 2 puffs into the lungs every 6 (six) hours as needed for Wheezing. Rescue 18 g 6    [DISCONTINUED] ergocalciferol (ERGOCALCIFEROL) 50,000 unit Cap Take  "1 capsule (50,000 Units total) by mouth every 7 days. 12 capsule 1    [DISCONTINUED] levothyroxine (SYNTHROID) 50 MCG tablet TAKE 1 TABLET(50 MCG) BY MOUTH EVERY DAY 90 tablet 0    [DISCONTINUED] rosuvastatin (CRESTOR) 40 MG Tab TAKE 1 TABLET(40 MG) BY MOUTH EVERY DAY 90 tablet 1    albuterol (VENTOLIN HFA) 90 mcg/actuation inhaler Inhale 2 puffs into the lungs every 6 (six) hours as needed for Wheezing. Rescue 18 g 6    ALPRAZolam (XANAX) 0.25 MG tablet Take 1 tablet (0.25 mg total) by mouth daily as needed for Anxiety. 90 tablet 0    ergocalciferol (ERGOCALCIFEROL) 50,000 unit Cap Take 1 capsule (50,000 Units total) by mouth every 7 days. 24 capsule 1    levothyroxine (SYNTHROID) 50 MCG tablet Take 1 tablet (50 mcg total) by mouth before breakfast. 90 tablet 1    rosuvastatin (CRESTOR) 40 MG Tab Take 1 tablet (40 mg total) by mouth once daily. 90 tablet 1    semaglutide (OZEMPIC) 0.25 mg or 0.5 mg (2 mg/3 mL) pen injector Inject 0.5 mg into the skin every 7 days. 1 mL 3    [DISCONTINUED] cholecalciferol, vitamin D3, 1,250 mcg (50,000 unit) capsule Decara 1,250 mcg (50,000 unit) capsule   TK ONE C PO  ONCE A MONTH      [DISCONTINUED] lisinopriL (PRINIVIL,ZESTRIL) 20 MG tablet Take 1 tablet (20 mg total) by mouth once daily. (Patient not taking: Reported on 10/20/2023) 90 tablet 3    [DISCONTINUED] metFORMIN (GLUCOPHAGE-XR) 500 MG ER 24hr tablet TAKE 1 TABLET(500 MG) BY MOUTH EVERY DAY (Patient not taking: Reported on 10/20/2023) 90 tablet 0     No facility-administered encounter medications on file as of 10/20/2023.        Review of patient's allergies indicates:   Allergen Reactions    Coly-mycin m [colistimethate sodium]     Latex, natural rubber Rash       Physical Exam      Vital Signs  Pulse: 90  SpO2: 98 %  BP: (!) 142/84  BP Location: Left arm  Patient Position: Sitting  Height and Weight  Height: 5' 5" (165.1 cm)  Weight: (!) 137 kg (302 lb 0.5 oz)  BSA (Calculated - sq m): 2.51 sq meters  BMI (Calculated): " 50.3  Weight in (lb) to have BMI = 25: 149.9    Physical Exam  Vitals and nursing note reviewed.   Constitutional:       General: She is not in acute distress.     Appearance: Normal appearance. She is well-developed. She is not ill-appearing.   HENT:      Head: Normocephalic and atraumatic.      Right Ear: External ear normal.      Left Ear: External ear normal.   Eyes:      Conjunctiva/sclera: Conjunctivae normal.      Pupils: Pupils are equal, round, and reactive to light.   Neck:      Thyroid: No thyromegaly.      Vascular: No JVD.      Trachea: No tracheal deviation.   Cardiovascular:      Rate and Rhythm: Normal rate and regular rhythm.      Heart sounds: Normal heart sounds. No murmur heard.  Pulmonary:      Effort: Pulmonary effort is normal.      Breath sounds: Normal breath sounds.   Chest:      Chest wall: No tenderness.   Abdominal:      General: Bowel sounds are normal.      Palpations: Abdomen is soft.      Tenderness: There is no abdominal tenderness. There is no guarding.   Musculoskeletal:         General: Normal range of motion.      Cervical back: Normal range of motion and neck supple.   Lymphadenopathy:      Cervical: No cervical adenopathy.   Skin:     General: Skin is warm and dry.   Neurological:      General: No focal deficit present.      Mental Status: She is alert and oriented to person, place, and time.   Psychiatric:         Mood and Affect: Mood normal.         Behavior: Behavior normal.         Thought Content: Thought content normal.         Judgment: Judgment normal.          Laboratory:  CBC:  Lab Results   Component Value Date    WBC 8.74 02/22/2023    RBC 5.07 02/22/2023    HGB 13.2 02/22/2023    HCT 42.8 02/22/2023     02/22/2023    MCV 84 02/22/2023    MCH 26.0 (L) 02/22/2023    MCHC 30.8 (L) 02/22/2023    MCHC 32.5 02/18/2006     CMP:  Lab Results   Component Value Date     (H) 02/22/2023    CALCIUM 9.7 02/22/2023    ALBUMIN 3.5 02/22/2023    PROT 7.2 02/22/2023  "    02/22/2023    K 4.1 02/22/2023    CO2 27 02/22/2023     02/22/2023    BUN 13 02/22/2023    ALKPHOS 99 02/22/2023    ALT 27 02/22/2023    AST 21 02/22/2023    BILITOT 0.7 02/22/2023    BILITOT 0.7 03/22/2022    BILITOT 0.6 02/18/2006     URINALYSIS:  No results found for: "COLORU", "CLARITYU", "SPECGRAV", "PHUR", "PROTEINUA", "GLUCOSEU", "BILIRUBINCON", "BLOODU", "WBCU", "RBCU", "BACTERIA", "MUCUS", "NITRITE", "LEUKOCYTESUR", "UROBILINOGEN", "HYALINECASTS"   LIPIDS:  Lab Results   Component Value Date    TSH 2.0 02/18/2006    HDL 66 07/05/2022    HDL 67.0 02/18/2006    CHOL 180 07/05/2022    CHOL 205 (H) 02/18/2006    TRIG 151 (H) 07/05/2022    TRIG 71 02/18/2006    LDLCALC 90 07/05/2022    LDLCALC 123.8 02/18/2006    CHOLHDL 2.73 07/05/2022    CHOLHDL 32.7 02/18/2006    TOTALCHOLEST 3.1 02/18/2006     TSH:  Lab Results   Component Value Date    TSH 2.0 02/18/2006     A1C:  Lab Results   Component Value Date    HGBA1C 6.0 (H) 02/22/2023    HGBA1C 5.6 03/22/2022         Assessment/Plan     Frances Monknsjosé miguel is a 63 y.o.female with:    Stress at home  -     ALPRAZolam (XANAX) 0.25 MG tablet; Take 1 tablet (0.25 mg total) by mouth daily as needed for Anxiety.  Dispense: 90 tablet; Refill: 0    Coronary artery disease involving native coronary artery of native heart without angina pectoris  -     rosuvastatin (CRESTOR) 40 MG Tab; Take 1 tablet (40 mg total) by mouth once daily.  Dispense: 90 tablet; Refill: 1    Carotid atherosclerosis, unspecified laterality  -     rosuvastatin (CRESTOR) 40 MG Tab; Take 1 tablet (40 mg total) by mouth once daily.  Dispense: 90 tablet; Refill: 1    Mixed hyperlipidemia  -     rosuvastatin (CRESTOR) 40 MG Tab; Take 1 tablet (40 mg total) by mouth once daily.  Dispense: 90 tablet; Refill: 1    Post-surgical hypothyroidism  -     levothyroxine (SYNTHROID) 50 MCG tablet; Take 1 tablet (50 mcg total) by mouth before breakfast.  Dispense: 90 tablet; Refill: 1    Vitamin " D deficiency  -     ergocalciferol (ERGOCALCIFEROL) 50,000 unit Cap; Take 1 capsule (50,000 Units total) by mouth every 7 days.  Dispense: 24 capsule; Refill: 1    Mild persistent asthma without complication  -     albuterol (VENTOLIN HFA) 90 mcg/actuation inhaler; Inhale 2 puffs into the lungs every 6 (six) hours as needed for Wheezing. Rescue  Dispense: 18 g; Refill: 6    Acute non-recurrent maxillary sinusitis  -     albuterol (VENTOLIN HFA) 90 mcg/actuation inhaler; Inhale 2 puffs into the lungs every 6 (six) hours as needed for Wheezing. Rescue  Dispense: 18 g; Refill: 6    Bronchitis  -     albuterol (VENTOLIN HFA) 90 mcg/actuation inhaler; Inhale 2 puffs into the lungs every 6 (six) hours as needed for Wheezing. Rescue  Dispense: 18 g; Refill: 6    Anxiety  -     ALPRAZolam (XANAX) 0.25 MG tablet; Take 1 tablet (0.25 mg total) by mouth daily as needed for Anxiety.  Dispense: 90 tablet; Refill: 0    Pre-diabetes  -     semaglutide (OZEMPIC) 0.25 mg or 0.5 mg (2 mg/3 mL) pen injector; Inject 0.5 mg into the skin every 7 days.  Dispense: 1 mL; Refill: 3    Class 3 severe obesity due to excess calories with body mass index (BMI) of 50.0 to 59.9 in adult, unspecified whether serious comorbidity present  -     semaglutide (OZEMPIC) 0.25 mg or 0.5 mg (2 mg/3 mL) pen injector; Inject 0.5 mg into the skin every 7 days.  Dispense: 1 mL; Refill: 3          Health Maintenance Due   Topic Date Due    COVID-19 Vaccine (1) Never done    Pneumococcal Vaccines (Age 0-64) (1 - PCV) Never done    TETANUS VACCINE  Never done    Aspirin/Antiplatelet Therapy  Never done    Shingles Vaccine (1 of 2) Never done    RSV Vaccine (Age 60+) (1 - 1-dose 60+ series) Never done        I spent 43 minutes on the day of this encounter for preparing for, evaluating, treating, and managing this patient.      -Continue current medications and maintain follow up with specialists.  Return to clinic as needed for any concerns   No follow-ups on  file.       Yanique Abad NP-C  [facility]

## 2023-11-16 ENCOUNTER — PATIENT MESSAGE (OUTPATIENT)
Dept: PRIMARY CARE CLINIC | Facility: CLINIC | Age: 63
End: 2023-11-16
Payer: COMMERCIAL

## 2023-11-28 ENCOUNTER — OFFICE VISIT (OUTPATIENT)
Dept: PRIMARY CARE CLINIC | Facility: CLINIC | Age: 63
End: 2023-11-28
Payer: COMMERCIAL

## 2023-11-28 VITALS
SYSTOLIC BLOOD PRESSURE: 138 MMHG | HEART RATE: 82 BPM | TEMPERATURE: 98 F | BODY MASS INDEX: 48.82 KG/M2 | DIASTOLIC BLOOD PRESSURE: 78 MMHG | WEIGHT: 293 LBS | HEIGHT: 65 IN | OXYGEN SATURATION: 97 %

## 2023-11-28 DIAGNOSIS — J10.1 INFLUENZA A: Primary | ICD-10-CM

## 2023-11-28 DIAGNOSIS — R50.9 FEVER, UNSPECIFIED FEVER CAUSE: ICD-10-CM

## 2023-11-28 DIAGNOSIS — R06.2 WHEEZING: ICD-10-CM

## 2023-11-28 DIAGNOSIS — R05.9 COUGH, UNSPECIFIED TYPE: ICD-10-CM

## 2023-11-28 LAB
CTP QC/QA: YES
CTP QC/QA: YES
POC MOLECULAR INFLUENZA A AGN: POSITIVE
POC MOLECULAR INFLUENZA B AGN: NEGATIVE
SARS-COV-2 AG RESP QL IA.RAPID: NEGATIVE

## 2023-11-28 PROCEDURE — 3078F PR MOST RECENT DIASTOLIC BLOOD PRESSURE < 80 MM HG: ICD-10-PCS | Mod: CPTII,S$GLB,, | Performed by: INTERNAL MEDICINE

## 2023-11-28 PROCEDURE — 99999 PR PBB SHADOW E&M-EST. PATIENT-LVL IV: ICD-10-PCS | Mod: PBBFAC,,, | Performed by: INTERNAL MEDICINE

## 2023-11-28 PROCEDURE — 87811 SARS CORONAVIRUS 2 ANTIGEN POCT, MANUAL READ: ICD-10-PCS | Mod: QW,S$GLB,, | Performed by: INTERNAL MEDICINE

## 2023-11-28 PROCEDURE — 3044F HG A1C LEVEL LT 7.0%: CPT | Mod: CPTII,S$GLB,, | Performed by: INTERNAL MEDICINE

## 2023-11-28 PROCEDURE — 99214 PR OFFICE/OUTPT VISIT, EST, LEVL IV, 30-39 MIN: ICD-10-PCS | Mod: S$GLB,,, | Performed by: INTERNAL MEDICINE

## 2023-11-28 PROCEDURE — 3008F PR BODY MASS INDEX (BMI) DOCUMENTED: ICD-10-PCS | Mod: CPTII,S$GLB,, | Performed by: INTERNAL MEDICINE

## 2023-11-28 PROCEDURE — 1159F PR MEDICATION LIST DOCUMENTED IN MEDICAL RECORD: ICD-10-PCS | Mod: CPTII,S$GLB,, | Performed by: INTERNAL MEDICINE

## 2023-11-28 PROCEDURE — 4010F PR ACE/ARB THEARPY RXD/TAKEN: ICD-10-PCS | Mod: CPTII,S$GLB,, | Performed by: INTERNAL MEDICINE

## 2023-11-28 PROCEDURE — 3078F DIAST BP <80 MM HG: CPT | Mod: CPTII,S$GLB,, | Performed by: INTERNAL MEDICINE

## 2023-11-28 PROCEDURE — 87502 INFLUENZA DNA AMP PROBE: CPT | Mod: QW,S$GLB,, | Performed by: INTERNAL MEDICINE

## 2023-11-28 PROCEDURE — 99214 OFFICE O/P EST MOD 30 MIN: CPT | Mod: S$GLB,,, | Performed by: INTERNAL MEDICINE

## 2023-11-28 PROCEDURE — 4010F ACE/ARB THERAPY RXD/TAKEN: CPT | Mod: CPTII,S$GLB,, | Performed by: INTERNAL MEDICINE

## 2023-11-28 PROCEDURE — 3008F BODY MASS INDEX DOCD: CPT | Mod: CPTII,S$GLB,, | Performed by: INTERNAL MEDICINE

## 2023-11-28 PROCEDURE — 3075F SYST BP GE 130 - 139MM HG: CPT | Mod: CPTII,S$GLB,, | Performed by: INTERNAL MEDICINE

## 2023-11-28 PROCEDURE — 87811 SARS-COV-2 COVID19 W/OPTIC: CPT | Mod: QW,S$GLB,, | Performed by: INTERNAL MEDICINE

## 2023-11-28 PROCEDURE — 1159F MED LIST DOCD IN RCRD: CPT | Mod: CPTII,S$GLB,, | Performed by: INTERNAL MEDICINE

## 2023-11-28 PROCEDURE — 3075F PR MOST RECENT SYSTOLIC BLOOD PRESS GE 130-139MM HG: ICD-10-PCS | Mod: CPTII,S$GLB,, | Performed by: INTERNAL MEDICINE

## 2023-11-28 PROCEDURE — 99999 PR PBB SHADOW E&M-EST. PATIENT-LVL IV: CPT | Mod: PBBFAC,,, | Performed by: INTERNAL MEDICINE

## 2023-11-28 PROCEDURE — 87502 POCT INFLUENZA A/B MOLECULAR: ICD-10-PCS | Mod: QW,S$GLB,, | Performed by: INTERNAL MEDICINE

## 2023-11-28 PROCEDURE — 3044F PR MOST RECENT HEMOGLOBIN A1C LEVEL <7.0%: ICD-10-PCS | Mod: CPTII,S$GLB,, | Performed by: INTERNAL MEDICINE

## 2023-11-28 RX ORDER — PROMETHAZINE HYDROCHLORIDE AND DEXTROMETHORPHAN HYDROBROMIDE 6.25; 15 MG/5ML; MG/5ML
10 SYRUP ORAL EVERY 6 HOURS PRN
Qty: 200 ML | Refills: 0 | Status: SHIPPED | OUTPATIENT
Start: 2023-11-28 | End: 2023-12-08

## 2023-11-28 RX ORDER — OSELTAMIVIR PHOSPHATE 75 MG/1
75 CAPSULE ORAL 2 TIMES DAILY
Qty: 10 CAPSULE | Refills: 0 | Status: SHIPPED | OUTPATIENT
Start: 2023-11-28 | End: 2023-12-03

## 2023-11-28 RX ORDER — PREDNISONE 20 MG/1
20 TABLET ORAL DAILY
Qty: 5 TABLET | Refills: 0 | Status: SHIPPED | OUTPATIENT
Start: 2023-11-28 | End: 2023-12-22

## 2023-11-28 NOTE — PROGRESS NOTES
[facility] Note    Chief Complaint      Chief Complaint   Patient presents with    Sore Throat     X 3 days    Sinusitis    Fatigue    Generalized Body Aches    Cough     Dry      History of Present Illness      Frances Cardoza is a 63 y.o. female who presents today for chief complaint uri x3 d   HPI   Feeling ill x 3 days: subj fever, headche, body ache, ear pain, sore throat, non productive cough, congestiom and rhinorrhea   Negative home covid yesterday   Flu negative today   She took otc multisympotm     Active Problem List with Overview Notes    Diagnosis Date Noted    Morbid (severe) obesity due to excess calories 02/26/2023    Disorder of arteries and arterioles, unspecified 02/26/2023    Seasonal affective disorder 11/10/2022    Family history of colon cancer 09/14/2022     Mother       Family history of BRCA gene mutation 09/14/2022    Family history of breast cancer 09/14/2022     M GGM, M Aunt, 2 sisters both BRCA pos       Coronary artery disease involving native coronary artery of native heart without angina pectoris 09/14/2022    Carotid atherosclerosis 09/14/2022    Hypertension, benign 09/14/2022    Mixed hyperlipidemia 09/14/2022    Asthma, mild persistent 09/14/2022    Post-surgical hypothyroidism 09/14/2022    Fibromyalgia 09/14/2022    Vitamin D deficiency 09/14/2022       Health Maintenance   Topic Date Due    TETANUS VACCINE  Never done    Aspirin/Antiplatelet Therapy  Never done    Shingles Vaccine (1 of 2) Never done    Mammogram  04/25/2024    Lipid Panel  07/05/2027    Colorectal Cancer Screening  04/28/2033    Hepatitis C Screening  Completed       Past Medical History:   Diagnosis Date    Genetic testing 12/2022    negative, [facility] 84-gene Multi-Cancer +RNA panel    GERD (gastroesophageal reflux disease)     High cholesterol     Hypertension     Plantar fasciitis     Post-surgical hypothyroidism 1997    Seasonal affective disorder 11/10/2022       Past  Surgical History:   Procedure Laterality Date    HERNIA REPAIR  1997    TONSILLECTOMY  1977    TOTAL THYROIDECTOMY Bilateral 1997    due to rapid growth of a benign nodule    uterine ablation  2010    for menorrhagia       family history includes BRCA 1/2 in her maternal aunt, sister, and sister; Breast cancer (age of onset: 25) in her maternal aunt; Breast cancer (age of onset: 56) in her sister; Breast cancer (age of onset: 58) in her sister; Breast cancer (age of onset: 80) in an other family member; Cancer (age of onset: 1) in an other family member; Cancer (age of onset: 50) in her maternal uncle; Cancer (age of onset: 88) in her paternal grandmother; Colon cancer in her mother; Heart attack in her paternal uncle; Heart attacks under age 50 in her paternal grandfather; Heart attacks under age 50 (age of onset: 30) in her brother; Hypertension in her father; Stroke in her paternal uncle; Stroke (age of onset: 71) in her father.    Social History     Tobacco Use    Smoking status: Never    Smokeless tobacco: Never   Substance Use Topics    Alcohol use: Yes     Comment: occ    Drug use: No       Review of Systems   Constitutional:  Positive for chills, fever and malaise/fatigue.   HENT:  Positive for congestion, ear pain, sinus pain and sore throat.    Respiratory:  Positive for cough and wheezing. Negative for sputum production and shortness of breath.         Outpatient Encounter Medications as of 11/28/2023   Medication Sig Dispense Refill    albuterol (VENTOLIN HFA) 90 mcg/actuation inhaler Inhale 2 puffs into the lungs every 6 (six) hours as needed for Wheezing. Rescue 18 g 6    clobetasoL-emollient 0.05 % Crea clobetasol-emollient 0.05 % topical cream   SABINO AA BID      DULoxetine (CYMBALTA) 20 MG capsule Take 2 capsules (40 mg total) by mouth once daily. 60 capsule 1    ergocalciferol (ERGOCALCIFEROL) 50,000 unit Cap Take 1 capsule (50,000 Units total) by mouth every 7 days. 24 capsule 1    levothyroxine  "(SYNTHROID) 50 MCG tablet Take 1 tablet (50 mcg total) by mouth before breakfast. 90 tablet 1    montelukast (SINGULAIR) 10 mg tablet TAKE 1 TABLET(10 MG) BY MOUTH EVERY EVENING 90 tablet 1    mupirocin (BACTROBAN) 2 % ointment Apply 1 g topically 2 (two) times daily.      nebivoloL (BYSTOLIC) 10 MG Tab Take 10 mg by mouth once daily.      pantoprazole (PROTONIX) 40 MG tablet Take 1 tablet (40 mg total) by mouth 2 (two) times daily. 180 tablet 1    rosuvastatin (CRESTOR) 40 MG Tab Take 1 tablet (40 mg total) by mouth once daily. 90 tablet 1    semaglutide (OZEMPIC) 0.25 mg or 0.5 mg (2 mg/3 mL) pen injector Inject 0.5 mg into the skin every 7 days. 1 mL 3    SEMAGLUTIDE SUBQ Inject into the skin.      ALPRAZolam (XANAX) 0.25 MG tablet Take 1 tablet (0.25 mg total) by mouth daily as needed for Anxiety. 90 tablet 0    oseltamivir (TAMIFLU) 75 MG capsule Take 1 capsule (75 mg total) by mouth 2 (two) times daily. for 5 days 10 capsule 0    predniSONE (DELTASONE) 20 MG tablet Take 1 tablet (20 mg total) by mouth once daily. 5 tablet 0    promethazine-dextromethorphan (PROMETHAZINE-DM) 6.25-15 mg/5 mL Syrp Take 10 mLs by mouth every 6 (six) hours as needed. 200 mL 0     No facility-administered encounter medications on file as of 11/28/2023.        Review of patient's allergies indicates:   Allergen Reactions    Coly-mycin m [colistimethate sodium]     Latex, natural rubber Rash           Physical Exam      Vital Signs  Temp: 98.3 °F (36.8 °C)  Pulse: 82  SpO2: 97 %  BP: 138/78  BP Location: Right arm  Patient Position: Sitting  Height and Weight  Height: 5' 5" (165.1 cm)  Weight: 135.1 kg (297 lb 13.5 oz)  BSA (Calculated - sq m): 2.49 sq meters  BMI (Calculated): 49.6  Weight in (lb) to have BMI = 25: 149.9]    Physical Exam  Vitals reviewed.   Constitutional:       General: She is not in acute distress.     Appearance: She is well-developed. She is not diaphoretic.   HENT:      Head: Normocephalic and atraumatic.      " "Right Ear: External ear normal. A middle ear effusion is present.      Left Ear: External ear normal. A middle ear effusion is present.      Nose: Nose normal.      Mouth/Throat:      Pharynx: Posterior oropharyngeal erythema present. No oropharyngeal exudate.   Eyes:      General:         Right eye: No discharge.         Left eye: No discharge.      Conjunctiva/sclera: Conjunctivae normal.   Pulmonary:      Effort: Pulmonary effort is normal. No respiratory distress.      Breath sounds: Wheezing present. No rhonchi or rales.   Musculoskeletal:         General: Normal range of motion.      Cervical back: Normal range of motion.   Skin:     Coloration: Skin is not pale.      Findings: No rash.   Neurological:      Mental Status: She is alert and oriented to person, place, and time.   Psychiatric:         Behavior: Behavior normal.         Thought Content: Thought content normal.          Laboratory:  CBC:  No results for input(s): "WBC", "RBC", "HGB", "HCT", "PLT", "MCV", "MCH", "MCHC" in the last 2160 hours.  CMP:  No results for input(s): "GLU", "CALCIUM", "ALBUMIN", "PROT", "NA", "K", "CO2", "CL", "BUN", "ALKPHOS", "ALT", "AST", "BILITOT" in the last 2160 hours.    Invalid input(s): "CREATININ"  URINALYSIS:  No results for input(s): "COLORU", "CLARITYU", "SPECGRAV", "PHUR", "PROTEINUA", "GLUCOSEU", "BILIRUBINCON", "BLOODU", "WBCU", "RBCU", "BACTERIA", "MUCUS", "NITRITE", "LEUKOCYTESUR", "UROBILINOGEN", "HYALINECASTS" in the last 2160 hours.   LIPIDS:  No results for input(s): "TSH", "HDL", "CHOL", "TRIG", "LDLCALC", "CHOLHDL", "NONHDLCHOL", "TOTALCHOLEST" in the last 2160 hours.  TSH:  No results for input(s): "TSH" in the last 2160 hours.  A1C:  No results for input(s): "HGBA1C" in the last 2160 hours.    Radiology:      Assessment/Plan     [facility] is a 63 y.o.female with:    1. Influenza A  -     oseltamivir (TAMIFLU) 75 MG capsule; Take 1 capsule (75 mg total) by mouth 2 (two) times daily. for " 5 days  Dispense: 10 capsule; Refill: 0    2. Fever, unspecified fever cause  -     POCT Influenza A/B Molecular  -     SARS Coronavirus 2 Antigen, POCT Manual Read    3. Cough, unspecified type  -     promethazine-dextromethorphan (PROMETHAZINE-DM) 6.25-15 mg/5 mL Syrp; Take 10 mLs by mouth every 6 (six) hours as needed.  Dispense: 200 mL; Refill: 0    4. Wheezing  -     predniSONE (DELTASONE) 20 MG tablet; Take 1 tablet (20 mg total) by mouth once daily.  Dispense: 5 tablet; Refill: 0         Use of the [employer] Patient Portal discussed and encouraged during today's visit  -Continue current medications and maintain follow up with specialists.  Return to clinic in prn .  No future appointments.    Katherine Scott MD  11/28/2023 11:53 AM    [facility]

## 2023-11-29 ENCOUNTER — PATIENT MESSAGE (OUTPATIENT)
Dept: PRIMARY CARE CLINIC | Facility: CLINIC | Age: 63
End: 2023-11-29
Payer: COMMERCIAL

## 2023-11-29 DIAGNOSIS — R05.9 COUGH, UNSPECIFIED TYPE: Primary | ICD-10-CM

## 2023-12-01 ENCOUNTER — HOSPITAL ENCOUNTER (OUTPATIENT)
Dept: RADIOLOGY | Facility: HOSPITAL | Age: 63
Discharge: HOME | End: 2023-12-01
Attending: INTERNAL MEDICINE
Payer: COMMERCIAL

## 2023-12-01 DIAGNOSIS — R05.9 COUGH, UNSPECIFIED TYPE: ICD-10-CM

## 2023-12-01 PROCEDURE — 71046 X-RAY EXAM CHEST 2 VIEWS: CPT | Mod: 26,,, | Performed by: RADIOLOGY

## 2023-12-01 PROCEDURE — 71046 X-RAY EXAM CHEST 2 VIEWS: CPT | Mod: TC

## 2023-12-01 PROCEDURE — 71046 XR CHEST PA AND LATERAL: ICD-10-PCS | Mod: 26,,, | Performed by: RADIOLOGY

## 2023-12-13 ENCOUNTER — PATIENT MESSAGE (OUTPATIENT)
Dept: PRIMARY CARE CLINIC | Facility: CLINIC | Age: 63
End: 2023-12-13
Payer: COMMERCIAL

## 2023-12-18 ENCOUNTER — OFFICE VISIT (OUTPATIENT)
Dept: INTERNAL MEDICINE | Facility: CLINIC | Age: 63
End: 2023-12-18
Payer: COMMERCIAL

## 2023-12-18 ENCOUNTER — HOSPITAL ENCOUNTER (EMERGENCY)
Facility: HOSPITAL | Age: 63
Discharge: HOME | End: 2023-12-18
Attending: STUDENT IN AN ORGANIZED HEALTH CARE EDUCATION/TRAINING PROGRAM
Payer: COMMERCIAL

## 2023-12-18 ENCOUNTER — PATIENT MESSAGE (OUTPATIENT)
Dept: PRIMARY CARE CLINIC | Facility: CLINIC | Age: 63
End: 2023-12-18
Payer: COMMERCIAL

## 2023-12-18 VITALS
HEIGHT: 65 IN | OXYGEN SATURATION: 95 % | TEMPERATURE: 99 F | RESPIRATION RATE: 18 BRPM | WEIGHT: 293 LBS | HEART RATE: 107 BPM | DIASTOLIC BLOOD PRESSURE: 68 MMHG | SYSTOLIC BLOOD PRESSURE: 128 MMHG | BODY MASS INDEX: 48.82 KG/M2

## 2023-12-18 VITALS
WEIGHT: 293 LBS | OXYGEN SATURATION: 91 % | BODY MASS INDEX: 48.82 KG/M2 | SYSTOLIC BLOOD PRESSURE: 122 MMHG | HEIGHT: 65 IN | DIASTOLIC BLOOD PRESSURE: 74 MMHG | HEART RATE: 115 BPM

## 2023-12-18 DIAGNOSIS — R06.02 SOB (SHORTNESS OF BREATH): ICD-10-CM

## 2023-12-18 DIAGNOSIS — J45.901 MODERATE ASTHMA WITH ACUTE EXACERBATION, UNSPECIFIED WHETHER PERSISTENT: Primary | ICD-10-CM

## 2023-12-18 DIAGNOSIS — J45.909 ASTHMA, UNSPECIFIED ASTHMA SEVERITY, UNSPECIFIED WHETHER COMPLICATED, UNSPECIFIED WHETHER PERSISTENT: Primary | ICD-10-CM

## 2023-12-18 DIAGNOSIS — J18.9 PNEUMONIA OF LEFT LOWER LOBE DUE TO INFECTIOUS ORGANISM: ICD-10-CM

## 2023-12-18 DIAGNOSIS — Z87.09 HISTORY OF INFLUENZA: ICD-10-CM

## 2023-12-18 LAB
ANION GAP SERPL CALC-SCNC: 10 MMOL/L (ref 8–16)
BASOPHILS # BLD AUTO: 0.06 K/UL (ref 0–0.2)
BASOPHILS NFR BLD: 0.6 % (ref 0–1.9)
BUN SERPL-MCNC: 20 MG/DL (ref 8–23)
CALCIUM SERPL-MCNC: 10.5 MG/DL (ref 8.7–10.5)
CHLORIDE SERPL-SCNC: 103 MMOL/L (ref 95–110)
CO2 SERPL-SCNC: 23 MMOL/L (ref 23–29)
CREAT SERPL-MCNC: 0.8 MG/DL (ref 0.5–1.4)
DIFFERENTIAL METHOD: ABNORMAL
EOSINOPHIL # BLD AUTO: 0.2 K/UL (ref 0–0.5)
EOSINOPHIL NFR BLD: 1.8 % (ref 0–8)
ERYTHROCYTE [DISTWIDTH] IN BLOOD BY AUTOMATED COUNT: 16.8 % (ref 11.5–14.5)
EST. GFR  (NO RACE VARIABLE): >60 ML/MIN/1.73 M^2
GLUCOSE SERPL-MCNC: 118 MG/DL (ref 70–110)
HCT VFR BLD AUTO: 41.8 % (ref 37–48.5)
HGB BLD-MCNC: 13.4 G/DL (ref 12–16)
IMM GRANULOCYTES # BLD AUTO: 0.06 K/UL (ref 0–0.04)
IMM GRANULOCYTES NFR BLD AUTO: 0.6 % (ref 0–0.5)
INFLUENZA A, MOLECULAR: NOT DETECTED
INFLUENZA B, MOLECULAR: NOT DETECTED
LYMPHOCYTES # BLD AUTO: 2.3 K/UL (ref 1–4.8)
LYMPHOCYTES NFR BLD: 24.6 % (ref 18–48)
MCH RBC QN AUTO: 24.6 PG (ref 27–31)
MCHC RBC AUTO-ENTMCNC: 32.1 G/DL (ref 32–36)
MCV RBC AUTO: 77 FL (ref 82–98)
MONOCYTES # BLD AUTO: 0.7 K/UL (ref 0.3–1)
MONOCYTES NFR BLD: 7.6 % (ref 4–15)
NEUTROPHILS # BLD AUTO: 6 K/UL (ref 1.8–7.7)
NEUTROPHILS NFR BLD: 64.8 % (ref 38–73)
NRBC BLD-RTO: 0 /100 WBC
PLATELET # BLD AUTO: 268 K/UL (ref 150–450)
PMV BLD AUTO: 8.7 FL (ref 9.2–12.9)
POTASSIUM SERPL-SCNC: 3.7 MMOL/L (ref 3.5–5.1)
RBC # BLD AUTO: 5.44 M/UL (ref 4–5.4)
RSV AG BY MOLECULAR METHOD: NOT DETECTED
SARS-COV-2 RNA RESP QL NAA+PROBE: NOT DETECTED
SODIUM SERPL-SCNC: 136 MMOL/L (ref 136–145)
TROPONIN I SERPL DL<=0.01 NG/ML-MCNC: <0.006 NG/ML (ref 0–0.03)
WBC # BLD AUTO: 9.25 K/UL (ref 3.9–12.7)

## 2023-12-18 PROCEDURE — 80048 BASIC METABOLIC PNL TOTAL CA: CPT | Performed by: STUDENT IN AN ORGANIZED HEALTH CARE EDUCATION/TRAINING PROGRAM

## 2023-12-18 PROCEDURE — 63600175 PHARM REV CODE 636 W HCPCS: Performed by: STUDENT IN AN ORGANIZED HEALTH CARE EDUCATION/TRAINING PROGRAM

## 2023-12-18 PROCEDURE — 85025 COMPLETE CBC W/AUTO DIFF WBC: CPT | Performed by: STUDENT IN AN ORGANIZED HEALTH CARE EDUCATION/TRAINING PROGRAM

## 2023-12-18 PROCEDURE — 84484 ASSAY OF TROPONIN QUANT: CPT | Performed by: STUDENT IN AN ORGANIZED HEALTH CARE EDUCATION/TRAINING PROGRAM

## 2023-12-18 PROCEDURE — 25000242 PHARM REV CODE 250 ALT 637 W/ HCPCS: Performed by: STUDENT IN AN ORGANIZED HEALTH CARE EDUCATION/TRAINING PROGRAM

## 2023-12-18 PROCEDURE — 93005 ELECTROCARDIOGRAM TRACING: CPT

## 2023-12-18 PROCEDURE — 3074F SYST BP LT 130 MM HG: CPT | Mod: CPTII,S$GLB,, | Performed by: INTERNAL MEDICINE

## 2023-12-18 PROCEDURE — 1160F PR REVIEW ALL MEDS BY PRESCRIBER/CLIN PHARMACIST DOCUMENTED: ICD-10-PCS | Mod: CPTII,S$GLB,, | Performed by: INTERNAL MEDICINE

## 2023-12-18 PROCEDURE — 3008F BODY MASS INDEX DOCD: CPT | Mod: CPTII,S$GLB,, | Performed by: INTERNAL MEDICINE

## 2023-12-18 PROCEDURE — 94640 AIRWAY INHALATION TREATMENT: CPT

## 2023-12-18 PROCEDURE — 3078F DIAST BP <80 MM HG: CPT | Mod: CPTII,S$GLB,, | Performed by: INTERNAL MEDICINE

## 2023-12-18 PROCEDURE — 94761 N-INVAS EAR/PLS OXIMETRY MLT: CPT

## 2023-12-18 PROCEDURE — 4010F ACE/ARB THERAPY RXD/TAKEN: CPT | Mod: CPTII,S$GLB,, | Performed by: INTERNAL MEDICINE

## 2023-12-18 PROCEDURE — 93010 ELECTROCARDIOGRAM REPORT: CPT | Mod: ,,, | Performed by: INTERNAL MEDICINE

## 2023-12-18 PROCEDURE — 3044F HG A1C LEVEL LT 7.0%: CPT | Mod: CPTII,S$GLB,, | Performed by: INTERNAL MEDICINE

## 2023-12-18 PROCEDURE — 93010 EKG 12-LEAD: ICD-10-PCS | Mod: ,,, | Performed by: INTERNAL MEDICINE

## 2023-12-18 PROCEDURE — 3074F PR MOST RECENT SYSTOLIC BLOOD PRESSURE < 130 MM HG: ICD-10-PCS | Mod: CPTII,S$GLB,, | Performed by: INTERNAL MEDICINE

## 2023-12-18 PROCEDURE — 3078F PR MOST RECENT DIASTOLIC BLOOD PRESSURE < 80 MM HG: ICD-10-PCS | Mod: CPTII,S$GLB,, | Performed by: INTERNAL MEDICINE

## 2023-12-18 PROCEDURE — 0241U SARS-COV2 (COVID) WITH FLU/RSV BY PCR: CPT | Performed by: STUDENT IN AN ORGANIZED HEALTH CARE EDUCATION/TRAINING PROGRAM

## 2023-12-18 PROCEDURE — 25000003 PHARM REV CODE 250: Performed by: STUDENT IN AN ORGANIZED HEALTH CARE EDUCATION/TRAINING PROGRAM

## 2023-12-18 PROCEDURE — 99285 EMERGENCY DEPT VISIT HI MDM: CPT | Mod: 25

## 2023-12-18 PROCEDURE — 1160F RVW MEDS BY RX/DR IN RCRD: CPT | Mod: CPTII,S$GLB,, | Performed by: INTERNAL MEDICINE

## 2023-12-18 PROCEDURE — 4010F PR ACE/ARB THEARPY RXD/TAKEN: ICD-10-PCS | Mod: CPTII,S$GLB,, | Performed by: INTERNAL MEDICINE

## 2023-12-18 PROCEDURE — 63600175 PHARM REV CODE 636 W HCPCS: Performed by: EMERGENCY MEDICINE

## 2023-12-18 PROCEDURE — 99999 PR PBB SHADOW E&M-EST. PATIENT-LVL IV: ICD-10-PCS | Mod: PBBFAC,,, | Performed by: INTERNAL MEDICINE

## 2023-12-18 PROCEDURE — 96365 THER/PROPH/DIAG IV INF INIT: CPT

## 2023-12-18 PROCEDURE — 1159F PR MEDICATION LIST DOCUMENTED IN MEDICAL RECORD: ICD-10-PCS | Mod: CPTII,S$GLB,, | Performed by: INTERNAL MEDICINE

## 2023-12-18 PROCEDURE — 3044F PR MOST RECENT HEMOGLOBIN A1C LEVEL <7.0%: ICD-10-PCS | Mod: CPTII,S$GLB,, | Performed by: INTERNAL MEDICINE

## 2023-12-18 PROCEDURE — 99215 OFFICE O/P EST HI 40 MIN: CPT | Mod: 25,S$GLB,, | Performed by: INTERNAL MEDICINE

## 2023-12-18 PROCEDURE — 1159F MED LIST DOCD IN RCRD: CPT | Mod: CPTII,S$GLB,, | Performed by: INTERNAL MEDICINE

## 2023-12-18 PROCEDURE — 99215 PR OFFICE/OUTPT VISIT, EST, LEVL V, 40-54 MIN: ICD-10-PCS | Mod: 25,S$GLB,, | Performed by: INTERNAL MEDICINE

## 2023-12-18 PROCEDURE — 94640 AIRWAY INHALATION TREATMENT: CPT | Mod: S$GLB,,, | Performed by: INTERNAL MEDICINE

## 2023-12-18 PROCEDURE — 3008F PR BODY MASS INDEX (BMI) DOCUMENTED: ICD-10-PCS | Mod: CPTII,S$GLB,, | Performed by: INTERNAL MEDICINE

## 2023-12-18 PROCEDURE — 94640 PR INHAL RX, AIRWAY OBST/DX SPUTUM INDUCT: ICD-10-PCS | Mod: S$GLB,,, | Performed by: INTERNAL MEDICINE

## 2023-12-18 PROCEDURE — 99999 PR PBB SHADOW E&M-EST. PATIENT-LVL IV: CPT | Mod: PBBFAC,,, | Performed by: INTERNAL MEDICINE

## 2023-12-18 RX ORDER — CEPHALEXIN 500 MG/1
500 CAPSULE ORAL EVERY 12 HOURS
Qty: 10 CAPSULE | Refills: 0 | Status: SHIPPED | OUTPATIENT
Start: 2023-12-18 | End: 2023-12-23

## 2023-12-18 RX ORDER — ALBUTEROL SULFATE 2.5 MG/.5ML
5 SOLUTION RESPIRATORY (INHALATION)
Status: COMPLETED | OUTPATIENT
Start: 2023-12-18 | End: 2023-12-18

## 2023-12-18 RX ORDER — ALBUTEROL SULFATE 0.83 MG/ML
2.5 SOLUTION RESPIRATORY (INHALATION) EVERY 6 HOURS PRN
Qty: 360 ML | Refills: 0 | Status: SHIPPED | OUTPATIENT
Start: 2023-12-18 | End: 2024-01-17

## 2023-12-18 RX ORDER — ALBUTEROL SULFATE 0.83 MG/ML
2.5 SOLUTION RESPIRATORY (INHALATION)
Status: DISCONTINUED | OUTPATIENT
Start: 2023-12-18 | End: 2023-12-18

## 2023-12-18 RX ORDER — CEPHALEXIN 500 MG/1
500 CAPSULE ORAL
Status: COMPLETED | OUTPATIENT
Start: 2023-12-18 | End: 2023-12-18

## 2023-12-18 RX ORDER — AZITHROMYCIN 250 MG/1
250 TABLET, FILM COATED ORAL DAILY
Qty: 6 TABLET | Refills: 0 | Status: SHIPPED | OUTPATIENT
Start: 2023-12-18

## 2023-12-18 RX ORDER — PREDNISONE 20 MG/1
60 TABLET ORAL
Status: COMPLETED | OUTPATIENT
Start: 2023-12-18 | End: 2023-12-18

## 2023-12-18 RX ORDER — IPRATROPIUM BROMIDE AND ALBUTEROL SULFATE 2.5; .5 MG/3ML; MG/3ML
3 SOLUTION RESPIRATORY (INHALATION)
Status: COMPLETED | OUTPATIENT
Start: 2023-12-18 | End: 2023-12-18

## 2023-12-18 RX ADMIN — ALBUTEROL SULFATE 5 MG: 2.5 SOLUTION RESPIRATORY (INHALATION) at 07:12

## 2023-12-18 RX ADMIN — IPRATROPIUM BROMIDE AND ALBUTEROL SULFATE 3 ML: 2.5; .5 SOLUTION RESPIRATORY (INHALATION) at 03:12

## 2023-12-18 RX ADMIN — AZITHROMYCIN 500 MG: 500 INJECTION, POWDER, LYOPHILIZED, FOR SOLUTION INTRAVENOUS at 08:12

## 2023-12-18 RX ADMIN — CEPHALEXIN 500 MG: 500 CAPSULE ORAL at 08:12

## 2023-12-18 RX ADMIN — PREDNISONE 60 MG: 20 TABLET ORAL at 06:12

## 2023-12-18 NOTE — PROVIDER PROGRESS NOTES - EMERGENCY DEPT.
Encounter Date: 12/18/2023    ED Physician Progress Notes         EKG - STEMI Decision  Initial Reading: No STEMI present.

## 2023-12-18 NOTE — FIRST PROVIDER EVALUATION
"Medical screening examination initiated.  I have conducted a focused provider triage encounter, findings are as follows:    Brief history of present illness: Sent in from clinic for SOB, tight chest. Got 3 breathing treatments before coming.  Recent bronchitis / Influenza A Nov 28th  Fever 2 days ago.   Hypoxic in the clinic but hypoxia resolved in ED.     Vitals:    12/18/23 1703   BP: (!) 137/90   Pulse: 110   Resp: (!) 22   Temp: 97.8 °F (36.6 °C)   TempSrc: Oral   SpO2: 97%   Weight: 133.4 kg (294 lb)   Height: 5' 5" (1.651 m)       Pertinent physical exam:  NAD, speaking in full sentences. Wheezing with good air movement.     Brief workup plan:  Prednisone, CXR, EKG.     Preliminary workup initiated; this workup will be continued and followed by the physician or advanced practice provider that is assigned to the patient when roomed.  "

## 2023-12-18 NOTE — PROGRESS NOTES
Subjective     Patient ID: Frances Cardoza is a 63 y.o. female.    Chief Complaint: Cough and Wheezing    Cough  Associated symptoms include wheezing.   Wheezing   Associated symptoms include coughing.     Became ill 3 weeks ago when she was dx with influenza A.    Wheezing.  Ribs painful.     She has coughed constantly for days.      SOB.       Dry cough.  Lots of phlegm in throat.  Nasal drainage off and on.  Ears uncomfortable.  Throat burns.  Temp 102.9 2 days ago.  Pulse ox running low 90's at home.  She typically runs in high 90's, when not ill.     Dr Scott prescribed prednisone 20 mg x 5 d, tamiflu, promethazine 11/28.      She has h/o asthma.  Last attack months ago.     Albuterolnhaler, cough meds, theraflu not helpful.     She has nebulizer, but out of solution.    CXR normal 11/30.    Venous procedure - venins injected by [facility], due to bleeding.      Review of Systems   Respiratory:  Positive for cough and wheezing.           Objective     Physical Exam  Constitutional:       General: She is not in acute distress.     Appearance: She is well-developed. She is obese. She is not ill-appearing or diaphoretic.   HENT:      Head: Normocephalic and atraumatic.      Right Ear: Tympanic membrane normal.      Left Ear: Tympanic membrane normal.      Mouth/Throat:      Pharynx: No oropharyngeal exudate.   Cardiovascular:      Rate and Rhythm: Regular rhythm. Tachycardia present.   Pulmonary:      Effort: Pulmonary effort is normal. No respiratory distress.      Breath sounds: No stridor. Wheezing (exp, bilat) present. No rhonchi or rales.   Musculoskeletal:      Cervical back: Neck supple.      Right lower leg: No edema.      Left lower leg: No edema.   Lymphadenopathy:      Cervical: No cervical adenopathy.   Neurological:      Mental Status: She is alert and oriented to person, place, and time.   Psychiatric:         Mood and Affect: Mood normal.         Behavior: Behavior normal.         Thought  Content: Thought content normal.            Assessment and Plan     1. Moderate asthma with acute exacerbation, unspecified whether persistent  -     albuterol-ipratropium 2.5 mg-0.5 mg/3 mL nebulizer solution 3 mL  -     X-Ray Chest PA And Lateral; Future; Expected date: 12/18/2023  -     Refer to Emergency Dept.    2. History of influenza  -     X-Ray Chest PA And Lateral; Future; Expected date: 12/18/2023    Other orders  -     Discontinue: albuterol nebulizer solution 2.5 mg             She was given Duoneb x 3 with persistent wheezing, and no obvious improvement .  Pulse ox fell to 87%.    Therefore, directed to ED.  She prefers to have  drive her      No follow-ups on file.

## 2023-12-19 ENCOUNTER — PATIENT MESSAGE (OUTPATIENT)
Dept: INTERNAL MEDICINE | Facility: CLINIC | Age: 63
End: 2023-12-19
Payer: COMMERCIAL

## 2023-12-19 NOTE — ED NOTES
Patient identifiers verified and correct for Frances Cardoza  LOC: The patient is awake, alert and aware of environment with an appropriate affect, the patient is oriented x 3 and speaking appropriately.   APPEARANCE: Patient appears comfortable and in no acute distress, patient is clean and well groomed.  SKIN: The skin is warm and dry, color consistent with ethnicity, patient has normal skin turgor and moist mucus membranes, skin intact, no breakdown or bruising noted.   MUSCULOSKELETAL: Patient moving all extremities spontaneously, no swelling noted.  RESPIRATORY: Airway is open and patent, respirations are spontaneous, patient has a normal effort.  Slight dyspnea with exertion.

## 2023-12-19 NOTE — ED TRIAGE NOTES
Pt states she was sent in by PCP for SOB.  Was given breathing treatment x 3 in clinic and pulse ox dropped to 80's.  Pt states she has been sick for approx 3-4 weeks.  Pt had bronchitis and flu.  States feels like congestion is worsening.

## 2023-12-19 NOTE — ED PROVIDER NOTES
Chief Complaint   Shortness of Breath (Flu A on nov 28, hx asthma, got duo nebs at clinic)      History Of Present Illness   Frances Cardoza is a 63 y.o. female with a PMHx including asthma, carotid stenosis, hypothyroidism, HLD  presenting with SOB.  Patient was diagnosed with flu A on 11/28.  She was prescribed Tamiflu, 5 day course of prednisone.  States that she has been taking over-the-counter medications additionally since then.  States that her symptoms have continued to worsen over the past 3 weeks including shortness of breath, wheezing, productive cough, congestion, and fevers.  States that she last had a fever yesterday of 101.  Patient was seen in the internal medicine clinic today and was noted to be hypoxic with a oxygen saturation of 87%.  Was also having significant wheezing.  Got 3 DuoNebs prior to arrival.  Patient states that her wheezing feels much improved after the 3 DuoNebs and she has been satting well on room air here in the ED. she reports left chest pain that she thinks is secondary to all of her coughing.  Otherwise reports no lightheadedness dizziness, loss of consciousness, vomiting, diarrhea.  States other people at work have also been sick in the past 2 weeks.      Independent Historian: Yes  Other Historian or Collateral:   at bedside  Interpretor: No      Review of patient's allergies indicates:   Allergen Reactions    Coly-mycin m [colistimethate sodium]     Latex, natural rubber Rash       No current facility-administered medications on file prior to encounter.     Current Outpatient Medications on File Prior to Encounter   Medication Sig Dispense Refill    albuterol (VENTOLIN HFA) 90 mcg/actuation inhaler Inhale 2 puffs into the lungs every 6 (six) hours as needed for Wheezing. Rescue 18 g 6    DULoxetine (CYMBALTA) 20 MG capsule Take 2 capsules (40 mg total) by mouth once daily. 60 capsule 1    ergocalciferol (ERGOCALCIFEROL) 50,000 unit Cap Take 1 capsule  (50,000 Units total) by mouth every 7 days. 24 capsule 1    levothyroxine (SYNTHROID) 50 MCG tablet Take 1 tablet (50 mcg total) by mouth before breakfast. 90 tablet 1    nebivoloL (BYSTOLIC) 10 MG Tab Take 10 mg by mouth once daily.      pantoprazole (PROTONIX) 40 MG tablet Take 1 tablet (40 mg total) by mouth 2 (two) times daily. 180 tablet 1    rosuvastatin (CRESTOR) 40 MG Tab Take 1 tablet (40 mg total) by mouth once daily. 90 tablet 1    semaglutide (OZEMPIC) 0.25 mg or 0.5 mg (2 mg/3 mL) pen injector Inject 0.5 mg into the skin every 7 days. 1 mL 3    SEMAGLUTIDE SUBQ Inject into the skin.         Past History   As per HPI and below:  Past Medical History:   Diagnosis Date    Genetic testing 12/2022    negative, [employer] 84-gene Multi-Cancer +RNA panel    GERD (gastroesophageal reflux disease)     High cholesterol     Hypertension     Plantar fasciitis     Post-surgical hypothyroidism 1997    Seasonal affective disorder 11/10/2022     Past Surgical History:   Procedure Laterality Date    HERNIA REPAIR  1997    TONSILLECTOMY  1977    TOTAL THYROIDECTOMY Bilateral 1997    due to rapid growth of a benign nodule    uterine ablation  2010    for menorrhagia       Social History     Socioeconomic History    Marital status:    Tobacco Use    Smoking status: Never    Smokeless tobacco: Never   Substance and Sexual Activity    Alcohol use: Yes     Comment: occ    Drug use: No       Family History   Problem Relation Age of Onset    Colon cancer Mother         mets to lung, etc    Stroke Father 71    Hypertension Father     BRCA 1/2 Sister     Breast cancer Sister 58        s/p bilat mast    BRCA 1/2 Sister     Breast cancer Sister 56        s/p bilat mast    Heart attacks under age 50 Brother 30        x2    Heart attacks under age 50 Paternal Grandfather     BRCA 1/2 Maternal Aunt     Breast cancer Maternal Aunt 25    Stroke Paternal Uncle     Heart attack Paternal Uncle     Cancer Paternal Grandmother 88         "soft tissue of her upper arm    Cancer Other 1        rare form of cancer only elderly people get, only seen in 5 children world wide, at base of neck    Cancer Maternal Uncle 50        prostate?    Breast cancer Other 80        s/p bilat mastectomy       Physical Exam     Vitals:    12/18/23 1703 12/18/23 1834 12/18/23 1924 12/18/23 2136   BP: (!) 137/90 (!) 176/111  128/68   BP Location:    Right arm   Patient Position:    Sitting   Pulse: 110 101 105 107   Resp: (!) 22 20 (!) 22 18   Temp: 97.8 °F (36.6 °C)   98.7 °F (37.1 °C)   TempSrc: Oral   Oral   SpO2: 97% 95% 96% 95%   Weight: 133.4 kg (294 lb)      Height: 5' 5" (1.651 m)          Physical Exam  Vitals reviewed.   Constitutional:       General: She is not in acute distress.     Appearance: Normal appearance. She is not toxic-appearing.   HENT:      Head: Normocephalic and atraumatic.      Nose: Congestion present.      Mouth/Throat:      Mouth: Mucous membranes are moist.      Pharynx: No oropharyngeal exudate or posterior oropharyngeal erythema.   Eyes:      General: No scleral icterus.     Extraocular Movements: Extraocular movements intact.      Pupils: Pupils are equal, round, and reactive to light.   Cardiovascular:      Rate and Rhythm: Normal rate and regular rhythm.   Pulmonary:      Effort: No respiratory distress.      Breath sounds: Examination of the right-upper field reveals wheezing. Examination of the left-upper field reveals wheezing. Examination of the left-middle field reveals rhonchi. Examination of the left-lower field reveals rhonchi. Wheezing (trace end-exipratory) and rhonchi present.   Abdominal:      General: There is no distension.      Palpations: Abdomen is soft.      Tenderness: There is no abdominal tenderness. There is no guarding.   Musculoskeletal:         General: No deformity.      Cervical back: No rigidity.   Skin:     General: Skin is warm and dry.      Capillary Refill: Capillary refill takes less than 2 seconds. "   Neurological:      General: No focal deficit present.      Mental Status: She is alert and oriented to person, place, and time.             Results     Labs Reviewed   CBC W/ AUTO DIFFERENTIAL - Abnormal; Notable for the following components:       Result Value    RBC 5.44 (*)     MCV 77 (*)     MCH 24.6 (*)     RDW 16.8 (*)     MPV 8.7 (*)     Immature Granulocytes 0.6 (*)     Immature Grans (Abs) 0.06 (*)     All other components within normal limits   BASIC METABOLIC PANEL - Abnormal; Notable for the following components:    Glucose 118 (*)     All other components within normal limits   TROPONIN I   SARS-COV2 (COVID) WITH FLU/RSV BY PCR       Imaging Results              X-Ray Chest PA And Lateral (Final result)  Result time 12/18/23 20:10:35      Final result by Maulik Lopez MD (12/18/23 20:10:35)                   Impression:      No acute cardiopulmonary process.    Retrocardiac hiatal hernia, similar to prior.    No significant change from prior study.      Electronically signed by: Maulik Lopez MD  Date:    12/18/2023  Time:    20:10               Narrative:    EXAMINATION:  XR CHEST PA AND LATERAL    CLINICAL HISTORY:  Shortness of breath    TECHNIQUE:  PA and lateral views of the chest were performed.    COMPARISON:  12/01/2023.    FINDINGS:  Retrocardiac hiatal hernia, similar compared to prior.    There is no consolidation, effusion, or pneumothorax.  Increased markings in the lungs, similar to prior.    Cardiomediastinal silhouette is similar to prior.    Regional osseous structures are similar to prior.                                            Initial [facility]   Medical Decision Making  Patient is a 63-year-old female presenting with cough, shortness of breath, fevers after recent flu infection.  Given that she is 3 weeks out from her flu diagnosis, concerning for superimposed pneumonia.  Also consider additional viral infection given her sick contacts at work.  Chest pain seems to be related  with the infection but she does have a significant family history for cardiac disease.  We will get workup including EKG and troponin to further evaluate for ACS, arrhythmia.  We will get chest x-ray to further evaluate for pneumonia.  Given her wheezing on exam, will give additional albuterol nebulizer treatment.    Amount and/or Complexity of Data Reviewed  Labs: ordered.    Risk  Prescription drug management.               Medications Given / Interventions     Medications   predniSONE tablet 60 mg (60 mg Oral Given 12/18/23 1802)   albuterol sulfate nebulizer solution 5 mg (5 mg Nebulization Given 12/18/23 1924)   cephALEXin capsule 500 mg (500 mg Oral Given 12/18/23 2039)   azithromycin (ZITHROMAX) 500 mg in dextrose 5 % (D5W) 250 mL IVPB (Vial-Mate) (0 mg Intravenous Stopped 12/18/23 2139)       Procedures     ED POCUS Performed: No    Reassessment and ED Course     ED Course as of 12/23/23 0728   Mon Dec 18, 2023   1959 CBC, BNP grossly okay.  COVID/flu/RSV test negative.  Troponin negative.  EKG personally reviewed shows normal sinus rhythm without acute ischemic changes. [CH]   2029 Chest x-ray personally reviewed which shows bilateral haziness at lung bases.  Given her exam, most concern for pneumonia.  We will treat with antibiotics.  Discussed results with patient.  Discussed need for outpatient follow up with PCP.  Given strict return precautions.  DC to home. [CH]      ED Course User Index  [CH] Raquel Granados MD              Final diagnoses:  [R06.02] SOB (shortness of breath)  [R06.02] SOB (shortness of breath) - hx of asthma.  [J45.909] Asthma, unspecified asthma severity, unspecified whether complicated, unspecified whether persistent (Primary)  [J18.9] Pneumonia of left lower lobe due to infectious organism           Dispo      ED Disposition Condition    Discharge Stable            ED Prescriptions       Medication Sig Dispense Start Date End Date Auth. Provider    cephALEXin (KEFLEX) 500 MG  capsule (Expires today) Take 1 capsule (500 mg total) by mouth every 12 (twelve) hours. for 5 days 10 capsule 12/18/2023 12/23/2023 Raquel Granados MD    azithromycin (Z-JOHN) 250 MG tablet Take 1 tablet (250 mg total) by mouth once daily. Take first 2 tablets together, then 1 every day until finished. 6 tablet 12/18/2023 -- Raquel Granados MD    albuterol (PROVENTIL) 2.5 mg /3 mL (0.083 %) nebulizer solution Take 3 mLs (2.5 mg total) by nebulization every 6 (six) hours as needed for Wheezing. Rescue 360 mL 12/18/2023 1/17/2024 Raquel Granados MD          Follow-up Information       Follow up With Specialties Details Why Contact Info    Katherine Scott MD Internal Medicine Schedule an appointment as soon as possible for a visit in 1 week  [address]  [address]  [facility] [address]  [phone]                          Raquel Granados MD  12/23/23 0676

## 2023-12-19 NOTE — DISCHARGE INSTRUCTIONS
You were seen in the emergency department today for cough, fevers, shortness of breath.  Your workup in the emergency department was reassuring.  Your exam is concerning for pneumonia, he will be prescribed antibiotics to take for the next 5 days.  You will also be prescribed a refill for albuterol nebulizer solution.  Please schedule follow up appointment with the primary care doctor within 1 week for re-evaluation.      Please return to the emergency department if you experience any new or concerning symptoms including increasing chest pain, chest tightness, shortness of breath, fever that does not respond to Tylenol/Motrin, lightheadedness, dizziness, or loss of consciousness.

## 2023-12-22 ENCOUNTER — HOSPITAL ENCOUNTER (OUTPATIENT)
Dept: RADIOLOGY | Facility: HOSPITAL | Age: 63
Discharge: HOME | End: 2023-12-22
Attending: NURSE PRACTITIONER
Payer: COMMERCIAL

## 2023-12-22 ENCOUNTER — PATIENT MESSAGE (OUTPATIENT)
Dept: PRIMARY CARE CLINIC | Facility: CLINIC | Age: 63
End: 2023-12-22
Payer: COMMERCIAL

## 2023-12-22 ENCOUNTER — OFFICE VISIT (OUTPATIENT)
Dept: PRIMARY CARE CLINIC | Facility: CLINIC | Age: 63
End: 2023-12-22
Payer: COMMERCIAL

## 2023-12-22 VITALS
WEIGHT: 293 LBS | HEART RATE: 84 BPM | OXYGEN SATURATION: 98 % | HEIGHT: 65 IN | DIASTOLIC BLOOD PRESSURE: 80 MMHG | SYSTOLIC BLOOD PRESSURE: 130 MMHG | BODY MASS INDEX: 48.82 KG/M2

## 2023-12-22 DIAGNOSIS — J45.30 MILD PERSISTENT ASTHMA WITHOUT COMPLICATION: Primary | ICD-10-CM

## 2023-12-22 DIAGNOSIS — J45.30 MILD PERSISTENT ASTHMA WITHOUT COMPLICATION: ICD-10-CM

## 2023-12-22 PROCEDURE — 3008F BODY MASS INDEX DOCD: CPT | Mod: CPTII,S$GLB,, | Performed by: NURSE PRACTITIONER

## 2023-12-22 PROCEDURE — 96372 PR INJECTION,THERAP/PROPH/DIAG2ST, IM OR SUBCUT: ICD-10-PCS | Mod: S$GLB,,, | Performed by: NURSE PRACTITIONER

## 2023-12-22 PROCEDURE — 3075F PR MOST RECENT SYSTOLIC BLOOD PRESS GE 130-139MM HG: ICD-10-PCS | Mod: CPTII,S$GLB,, | Performed by: NURSE PRACTITIONER

## 2023-12-22 PROCEDURE — 99214 PR OFFICE/OUTPT VISIT, EST, LEVL IV, 30-39 MIN: ICD-10-PCS | Mod: 25,S$GLB,, | Performed by: NURSE PRACTITIONER

## 2023-12-22 PROCEDURE — 99999 PR PBB SHADOW E&M-EST. PATIENT-LVL III: CPT | Mod: PBBFAC,,, | Performed by: NURSE PRACTITIONER

## 2023-12-22 PROCEDURE — 3044F PR MOST RECENT HEMOGLOBIN A1C LEVEL <7.0%: ICD-10-PCS | Mod: CPTII,S$GLB,, | Performed by: NURSE PRACTITIONER

## 2023-12-22 PROCEDURE — 3008F PR BODY MASS INDEX (BMI) DOCUMENTED: ICD-10-PCS | Mod: CPTII,S$GLB,, | Performed by: NURSE PRACTITIONER

## 2023-12-22 PROCEDURE — 96372 THER/PROPH/DIAG INJ SC/IM: CPT | Mod: S$GLB,,, | Performed by: NURSE PRACTITIONER

## 2023-12-22 PROCEDURE — 3075F SYST BP GE 130 - 139MM HG: CPT | Mod: CPTII,S$GLB,, | Performed by: NURSE PRACTITIONER

## 2023-12-22 PROCEDURE — 1159F MED LIST DOCD IN RCRD: CPT | Mod: CPTII,S$GLB,, | Performed by: NURSE PRACTITIONER

## 2023-12-22 PROCEDURE — 71046 X-RAY EXAM CHEST 2 VIEWS: CPT | Mod: 26,,, | Performed by: RADIOLOGY

## 2023-12-22 PROCEDURE — 71046 XR CHEST PA AND LATERAL: ICD-10-PCS | Mod: 26,,, | Performed by: RADIOLOGY

## 2023-12-22 PROCEDURE — 4010F PR ACE/ARB THEARPY RXD/TAKEN: ICD-10-PCS | Mod: CPTII,S$GLB,, | Performed by: NURSE PRACTITIONER

## 2023-12-22 PROCEDURE — 71046 X-RAY EXAM CHEST 2 VIEWS: CPT | Mod: TC

## 2023-12-22 PROCEDURE — 1160F RVW MEDS BY RX/DR IN RCRD: CPT | Mod: CPTII,S$GLB,, | Performed by: NURSE PRACTITIONER

## 2023-12-22 PROCEDURE — 4010F ACE/ARB THERAPY RXD/TAKEN: CPT | Mod: CPTII,S$GLB,, | Performed by: NURSE PRACTITIONER

## 2023-12-22 PROCEDURE — 3079F DIAST BP 80-89 MM HG: CPT | Mod: CPTII,S$GLB,, | Performed by: NURSE PRACTITIONER

## 2023-12-22 PROCEDURE — 3079F PR MOST RECENT DIASTOLIC BLOOD PRESSURE 80-89 MM HG: ICD-10-PCS | Mod: CPTII,S$GLB,, | Performed by: NURSE PRACTITIONER

## 2023-12-22 PROCEDURE — 1160F PR REVIEW ALL MEDS BY PRESCRIBER/CLIN PHARMACIST DOCUMENTED: ICD-10-PCS | Mod: CPTII,S$GLB,, | Performed by: NURSE PRACTITIONER

## 2023-12-22 PROCEDURE — 1159F PR MEDICATION LIST DOCUMENTED IN MEDICAL RECORD: ICD-10-PCS | Mod: CPTII,S$GLB,, | Performed by: NURSE PRACTITIONER

## 2023-12-22 PROCEDURE — 99999 PR PBB SHADOW E&M-EST. PATIENT-LVL III: ICD-10-PCS | Mod: PBBFAC,,, | Performed by: NURSE PRACTITIONER

## 2023-12-22 PROCEDURE — 99214 OFFICE O/P EST MOD 30 MIN: CPT | Mod: 25,S$GLB,, | Performed by: NURSE PRACTITIONER

## 2023-12-22 PROCEDURE — 3044F HG A1C LEVEL LT 7.0%: CPT | Mod: CPTII,S$GLB,, | Performed by: NURSE PRACTITIONER

## 2023-12-22 RX ORDER — TRIAMCINOLONE ACETONIDE 40 MG/ML
40 INJECTION, SUSPENSION INTRA-ARTICULAR; INTRAMUSCULAR
Status: COMPLETED | OUTPATIENT
Start: 2023-12-22 | End: 2023-12-22

## 2023-12-22 RX ORDER — PREDNISONE 20 MG/1
TABLET ORAL
Qty: 10 TABLET | Refills: 0 | Status: SHIPPED | OUTPATIENT
Start: 2023-12-22 | End: 2024-01-03

## 2023-12-22 RX ADMIN — TRIAMCINOLONE ACETONIDE 40 MG: 40 INJECTION, SUSPENSION INTRA-ARTICULAR; INTRAMUSCULAR at 08:12

## 2023-12-22 NOTE — PROGRESS NOTES
[facility] Note    Chief Complaint      Chief Complaint   Patient presents with    Hospital Follow Up       History of Present Illness      Frances Cardoza is a 63 y.o. female with chronic conditions of seasonal affective disorder, post-surgical hypothyroidism, htn, hld, gerd, who presents today for: ER follow up on 12/18 for asthma. Prior to Er went to Dr. Daily, 3 breathing treatments in office no improvement, was sent to ER. Given steroids, currently on Keflex and Z-Colt. Still intermittently wheezing, and coughing.   Sees Dr. Roy (cardiovascular). Current in office sats 98% on RA.     Past Medical History:  Past Medical History:   Diagnosis Date    Genetic testing 12/2022    negative, [employer] 84-gene Multi-Cancer +RNA panel    GERD (gastroesophageal reflux disease)     High cholesterol     Hypertension     Plantar fasciitis     Post-surgical hypothyroidism 1997    Seasonal affective disorder 11/10/2022       Past Surgical History:   has a past surgical history that includes Hernia repair (1997); Total thyroidectomy (Bilateral, 1997); Tonsillectomy (1977); and uterine ablation (2010).    Family History:  family history includes BRCA 1/2 in her maternal aunt, sister, and sister; Breast cancer (age of onset: 25) in her maternal aunt; Breast cancer (age of onset: 56) in her sister; Breast cancer (age of onset: 58) in her sister; Breast cancer (age of onset: 80) in an other family member; Cancer (age of onset: 1) in an other family member; Cancer (age of onset: 50) in her maternal uncle; Cancer (age of onset: 88) in her paternal grandmother; Colon cancer in her mother; Heart attack in her paternal uncle; Heart attacks under age 50 in her paternal grandfather; Heart attacks under age 50 (age of onset: 30) in her brother; Hypertension in her father; Stroke in her paternal uncle; Stroke (age of onset: 71) in her father.     Social History:  Social History     Tobacco Use    Smoking status:  Never    Smokeless tobacco: Never   Substance Use Topics    Alcohol use: Yes     Comment: occ    Drug use: No       Review of Systems   Constitutional:  Negative for chills and fever.   Respiratory:  Positive for cough, shortness of breath and wheezing.    Cardiovascular:  Negative for chest pain and palpitations.   Gastrointestinal:  Negative for constipation, diarrhea, nausea and vomiting.   Genitourinary:  Negative for dysuria and hematuria.   Musculoskeletal:  Negative for falls.   Neurological:  Negative for headaches.        Medications:  Outpatient Encounter Medications as of 12/22/2023   Medication Sig Dispense Refill    albuterol (PROVENTIL) 2.5 mg /3 mL (0.083 %) nebulizer solution Take 3 mLs (2.5 mg total) by nebulization every 6 (six) hours as needed for Wheezing. Rescue 360 mL 0    albuterol (VENTOLIN HFA) 90 mcg/actuation inhaler Inhale 2 puffs into the lungs every 6 (six) hours as needed for Wheezing. Rescue 18 g 6    azithromycin (Z-JOHN) 250 MG tablet Take 1 tablet (250 mg total) by mouth once daily. Take first 2 tablets together, then 1 every day until finished. 6 tablet 0    cephALEXin (KEFLEX) 500 MG capsule Take 1 capsule (500 mg total) by mouth every 12 (twelve) hours. for 5 days 10 capsule 0    DULoxetine (CYMBALTA) 20 MG capsule Take 2 capsules (40 mg total) by mouth once daily. 60 capsule 1    ergocalciferol (ERGOCALCIFEROL) 50,000 unit Cap Take 1 capsule (50,000 Units total) by mouth every 7 days. 24 capsule 1    levothyroxine (SYNTHROID) 50 MCG tablet Take 1 tablet (50 mcg total) by mouth before breakfast. 90 tablet 1    nebivoloL (BYSTOLIC) 10 MG Tab Take 10 mg by mouth once daily.      pantoprazole (PROTONIX) 40 MG tablet Take 1 tablet (40 mg total) by mouth 2 (two) times daily. 180 tablet 1    rosuvastatin (CRESTOR) 40 MG Tab Take 1 tablet (40 mg total) by mouth once daily. 90 tablet 1    semaglutide (OZEMPIC) 0.25 mg or 0.5 mg (2 mg/3 mL) pen injector Inject 0.5 mg into the skin every 7  "days. 1 mL 3    SEMAGLUTIDE SUBQ Inject into the skin.      [DISCONTINUED] ALPRAZolam (XANAX) 0.25 MG tablet Take 1 tablet (0.25 mg total) by mouth daily as needed for Anxiety. (Patient not taking: Reported on 12/18/2023) 90 tablet 0    [DISCONTINUED] clobetasoL-emollient 0.05 % Crea clobetasol-emollient 0.05 % topical cream   SABINO AA BID      [DISCONTINUED] montelukast (SINGULAIR) 10 mg tablet TAKE 1 TABLET(10 MG) BY MOUTH EVERY EVENING (Patient not taking: Reported on 12/18/2023) 90 tablet 1    [DISCONTINUED] mupirocin (BACTROBAN) 2 % ointment Apply 1 g topically 2 (two) times daily.      [DISCONTINUED] predniSONE (DELTASONE) 20 MG tablet Take 1 tablet (20 mg total) by mouth once daily. (Patient not taking: Reported on 12/18/2023) 5 tablet 0     Facility-Administered Encounter Medications as of 12/22/2023   Medication Dose Route Frequency Provider Last Rate Last Admin    triamcinolone acetonide injection 40 mg  40 mg Intramuscular 1 time in Clinic/HOD Nuha Ugalde, NICHOLE           Allergies:  Review of patient's allergies indicates:   Allergen Reactions    Coly-mycin m [colistimethate sodium]     Latex, natural rubber Rash       Health Maintenance:  Immunization History   Administered Date(s) Administered    Influenza 11/15/2022    Influenza - Quadrivalent - MDCK - PF 11/03/2022    Influenza - Quadrivalent - PF *Preferred* (6 months and older) 10/25/2018, 10/26/2019, 10/20/2023    Pneumococcal Conjugate - 20 Valent 11/03/2022      Health Maintenance   Topic Date Due    TETANUS VACCINE  Never done    Aspirin/Antiplatelet Therapy  Never done    Shingles Vaccine (1 of 2) Never done    Mammogram  04/25/2024    Lipid Panel  07/05/2027    Colorectal Cancer Screening  04/28/2033    Hepatitis C Screening  Completed        Physical Exam      Vital Signs  Pulse: 84  SpO2: 98 %  BP: 130/80  BP Location: Right arm  Patient Position: Sitting  Height and Weight  Height: 5' 5" (165.1 cm)  Weight: 135.3 kg (298 lb 4.5 oz)  BSA " (Calculated - sq m): 2.49 sq meters  BMI (Calculated): 49.6  Weight in (lb) to have BMI = 25: 149.9]    Physical Exam  Constitutional:       Appearance: She is well-developed.   HENT:      Head: Normocephalic and atraumatic.   Cardiovascular:      Rate and Rhythm: Normal rate and regular rhythm.      Heart sounds: Normal heart sounds. No murmur heard.  Pulmonary:      Effort: Pulmonary effort is normal. No respiratory distress.      Breath sounds: Wheezing (RUL, RLL) and rhonchi (LLL, LUB) present.   Abdominal:      General: There is no distension.      Palpations: Abdomen is soft.      Tenderness: There is no abdominal tenderness. There is no guarding.   Skin:     General: Skin is warm and dry.   Neurological:      Mental Status: She is alert. Mental status is at baseline.   Psychiatric:         Behavior: Behavior normal.          Laboratory:  CBC:  Recent Labs   Lab 02/22/23  1021 12/18/23  1852   WBC 8.74 9.25   RBC 5.07 5.44 H   Hemoglobin 13.2 13.4   Hematocrit 42.8 41.8   Platelets 273 268   MCV 84 77 L   MCH 26.0 L 24.6 L   MCHC 30.8 L 32.1     CMP:  Recent Labs   Lab 03/22/22  0827 02/22/23  1021 12/18/23  1852   Glucose 110 116 H 118 H   Calcium 10.0 9.7 10.5   Albumin 3.4 L 3.5  --    Total Protein 7.2 7.2  --    Sodium 140 140 136   Potassium 4.4 4.1 3.7   CO2 29 27 23   Chloride 103 104 103   BUN 16 13 20   Alkaline Phosphatase 81 99  --    ALT 24 27  --    AST 17 21  --    Total Bilirubin 0.7 0.7  --      URINALYSIS:       LIPIDS:  Recent Labs   Lab 07/05/22  0736   HDL 66   Cholesterol 180   Triglycerides 151 H   LDL Calculated 90   Hdl/Cholesterol Ratio 2.73     TSH:      A1C:  Recent Labs   Lab 03/22/22  0827 02/22/23  1021   Hemoglobin A1C 5.6 6.0 H       Assessment/Plan     Frances Cardoza is a 63 y.o.female with:    1. Mild persistent asthma without complication  - X-Ray Chest PA And Lateral; Future  - triamcinolone acetonide injection 40 mg  - will send rx steroids   - continue inhalers,  finish antibiotics   - will add more antibiotics pending x-ray results if needed  - discussed returning to ER if symptoms worsen over weekend. Pt understands, has pulse ox at home goal >92%    Chronic conditions status updated as per HPI.  Other than changes above, cont current medications and maintain follow up with specialists.  No follow-ups on file.    Future Appointments   Date Time Provider Department Center   1/3/2024  2:00 PM Jiminez, Erin C., NP [facility] [place] Adreinne Cotaya, FNP [facility]

## 2023-12-23 DIAGNOSIS — R73.03 PREDIABETES: ICD-10-CM

## 2023-12-23 NOTE — TELEPHONE ENCOUNTER
No care due was identified.  Health [facility] Embedded Care Due Messages. Reference number: [id number].   12/23/2023 5:26:54 AM CST

## 2023-12-26 RX ORDER — METFORMIN HYDROCHLORIDE 500 MG/1
TABLET, EXTENDED RELEASE ORAL
Qty: 90 TABLET | Refills: 0 | Status: SHIPPED | OUTPATIENT
Start: 2023-12-26 | End: 2024-01-03

## 2023-12-27 ENCOUNTER — PATIENT MESSAGE (OUTPATIENT)
Dept: PRIMARY CARE CLINIC | Facility: CLINIC | Age: 63
End: 2023-12-27
Payer: COMMERCIAL

## 2024-01-03 ENCOUNTER — LAB VISIT (OUTPATIENT)
Dept: LAB | Facility: HOSPITAL | Age: 64
End: 2024-01-03
Attending: NURSE PRACTITIONER
Payer: COMMERCIAL

## 2024-01-03 ENCOUNTER — OFFICE VISIT (OUTPATIENT)
Dept: PRIMARY CARE CLINIC | Facility: CLINIC | Age: 64
End: 2024-01-03
Payer: COMMERCIAL

## 2024-01-03 VITALS
OXYGEN SATURATION: 98 % | DIASTOLIC BLOOD PRESSURE: 82 MMHG | BODY MASS INDEX: 49.67 KG/M2 | SYSTOLIC BLOOD PRESSURE: 144 MMHG | WEIGHT: 293 LBS | RESPIRATION RATE: 16 BRPM | HEART RATE: 84 BPM

## 2024-01-03 DIAGNOSIS — R05.9 COUGH, UNSPECIFIED TYPE: ICD-10-CM

## 2024-01-03 DIAGNOSIS — R06.02 SOB (SHORTNESS OF BREATH): ICD-10-CM

## 2024-01-03 DIAGNOSIS — J40 BRONCHITIS: Primary | ICD-10-CM

## 2024-01-03 DIAGNOSIS — J40 BRONCHITIS: ICD-10-CM

## 2024-01-03 DIAGNOSIS — J45.30 MILD PERSISTENT ASTHMA WITHOUT COMPLICATION: ICD-10-CM

## 2024-01-03 LAB
ALBUMIN SERPL BCP-MCNC: 3.5 G/DL (ref 3.5–5.2)
ALP SERPL-CCNC: 91 U/L (ref 55–135)
ALT SERPL W/O P-5'-P-CCNC: 25 U/L (ref 10–44)
ANION GAP SERPL CALC-SCNC: 11 MMOL/L (ref 8–16)
AST SERPL-CCNC: 21 U/L (ref 10–40)
BASOPHILS # BLD AUTO: 0.05 K/UL (ref 0–0.2)
BASOPHILS NFR BLD: 0.5 % (ref 0–1.9)
BILIRUB SERPL-MCNC: 0.5 MG/DL (ref 0.1–1)
BUN SERPL-MCNC: 14 MG/DL (ref 8–23)
CALCIUM SERPL-MCNC: 10.2 MG/DL (ref 8.7–10.5)
CHLORIDE SERPL-SCNC: 104 MMOL/L (ref 95–110)
CO2 SERPL-SCNC: 27 MMOL/L (ref 23–29)
CREAT SERPL-MCNC: 1 MG/DL (ref 0.5–1.4)
DIFFERENTIAL METHOD BLD: ABNORMAL
EOSINOPHIL # BLD AUTO: 0.2 K/UL (ref 0–0.5)
EOSINOPHIL NFR BLD: 2.3 % (ref 0–8)
ERYTHROCYTE [DISTWIDTH] IN BLOOD BY AUTOMATED COUNT: 17.2 % (ref 11.5–14.5)
EST. GFR  (NO RACE VARIABLE): >60 ML/MIN/1.73 M^2
GLUCOSE SERPL-MCNC: 94 MG/DL (ref 70–110)
HCT VFR BLD AUTO: 43 % (ref 37–48.5)
HGB BLD-MCNC: 13.6 G/DL (ref 12–16)
IMM GRANULOCYTES # BLD AUTO: 0.03 K/UL (ref 0–0.04)
IMM GRANULOCYTES NFR BLD AUTO: 0.3 % (ref 0–0.5)
LYMPHOCYTES # BLD AUTO: 3 K/UL (ref 1–4.8)
LYMPHOCYTES NFR BLD: 30.1 % (ref 18–48)
MCH RBC QN AUTO: 24.8 PG (ref 27–31)
MCHC RBC AUTO-ENTMCNC: 31.6 G/DL (ref 32–36)
MCV RBC AUTO: 79 FL (ref 82–98)
MONOCYTES # BLD AUTO: 0.7 K/UL (ref 0.3–1)
MONOCYTES NFR BLD: 6.7 % (ref 4–15)
NEUTROPHILS # BLD AUTO: 6 K/UL (ref 1.8–7.7)
NEUTROPHILS NFR BLD: 60.1 % (ref 38–73)
NRBC BLD-RTO: 0 /100 WBC
PLATELET # BLD AUTO: 278 K/UL (ref 150–450)
PMV BLD AUTO: 9.2 FL (ref 9.2–12.9)
POTASSIUM SERPL-SCNC: 4.2 MMOL/L (ref 3.5–5.1)
PROT SERPL-MCNC: 7.4 G/DL (ref 6–8.4)
RBC # BLD AUTO: 5.48 M/UL (ref 4–5.4)
SODIUM SERPL-SCNC: 142 MMOL/L (ref 136–145)
WBC # BLD AUTO: 9.94 K/UL (ref 3.9–12.7)

## 2024-01-03 PROCEDURE — 3079F DIAST BP 80-89 MM HG: CPT | Mod: CPTII,S$GLB,, | Performed by: NURSE PRACTITIONER

## 2024-01-03 PROCEDURE — 99214 OFFICE O/P EST MOD 30 MIN: CPT | Mod: S$GLB,,, | Performed by: NURSE PRACTITIONER

## 2024-01-03 PROCEDURE — 1160F RVW MEDS BY RX/DR IN RCRD: CPT | Mod: CPTII,S$GLB,, | Performed by: NURSE PRACTITIONER

## 2024-01-03 PROCEDURE — 3008F BODY MASS INDEX DOCD: CPT | Mod: CPTII,S$GLB,, | Performed by: NURSE PRACTITIONER

## 2024-01-03 PROCEDURE — 80053 COMPREHEN METABOLIC PANEL: CPT | Performed by: NURSE PRACTITIONER

## 2024-01-03 PROCEDURE — 3077F SYST BP >= 140 MM HG: CPT | Mod: CPTII,S$GLB,, | Performed by: NURSE PRACTITIONER

## 2024-01-03 PROCEDURE — 1159F MED LIST DOCD IN RCRD: CPT | Mod: CPTII,S$GLB,, | Performed by: NURSE PRACTITIONER

## 2024-01-03 PROCEDURE — 99999 PR PBB SHADOW E&M-EST. PATIENT-LVL IV: CPT | Mod: PBBFAC,,, | Performed by: NURSE PRACTITIONER

## 2024-01-03 PROCEDURE — 85025 COMPLETE CBC W/AUTO DIFF WBC: CPT | Performed by: NURSE PRACTITIONER

## 2024-01-03 PROCEDURE — 36415 COLL VENOUS BLD VENIPUNCTURE: CPT | Performed by: NURSE PRACTITIONER

## 2024-01-03 RX ORDER — DOXYCYCLINE 100 MG/1
100 CAPSULE ORAL 2 TIMES DAILY
Qty: 14 CAPSULE | Refills: 0 | Status: SHIPPED | OUTPATIENT
Start: 2024-01-03 | End: 2024-01-10

## 2024-01-03 RX ORDER — CODEINE PHOSPHATE AND GUAIFENESIN 10; 100 MG/5ML; MG/5ML
5 SOLUTION ORAL 3 TIMES DAILY PRN
Qty: 140 ML | Refills: 0 | Status: SHIPPED | OUTPATIENT
Start: 2024-01-03 | End: 2024-01-13

## 2024-01-03 RX ORDER — METHYLPREDNISOLONE 4 MG/1
TABLET ORAL
Qty: 21 EACH | Refills: 0 | Status: SHIPPED | OUTPATIENT
Start: 2024-01-03 | End: 2024-01-24

## 2024-01-03 NOTE — PROGRESS NOTES
[facility] Note    Chief Complaint      Chief Complaint   Patient presents with    Follow-up     History of Present Illness      Frances Cardoza is a 63 y.o. female patient of Dr. Salazar who presents today for f/u with bad cough. Has had this cough for at least a month. Just started to be able to cough up phlegm. No fever, some sob. Nothing over the counter is helping. Feeling very fatigued    Health Maintenance   Topic Date Due    TETANUS VACCINE  Never done    Aspirin/Antiplatelet Therapy  Never done    Shingles Vaccine (1 of 2) Never done    Mammogram  04/25/2024    Lipid Panel  07/05/2027    Colorectal Cancer Screening  04/28/2033    Hepatitis C Screening  Completed       Past Medical History:   Diagnosis Date    Genetic testing 12/2022    negative, [facility] 84-gene Multi-Cancer +RNA panel    GERD (gastroesophageal reflux disease)     High cholesterol     Hypertension     Plantar fasciitis     Post-surgical hypothyroidism 1997    Seasonal affective disorder 11/10/2022       Past Surgical History:   Procedure Laterality Date    HERNIA REPAIR  1997    TONSILLECTOMY  1977    TOTAL THYROIDECTOMY Bilateral 1997    due to rapid growth of a benign nodule    uterine ablation  2010    for menorrhagia       family history includes BRCA 1/2 in her maternal aunt, sister, and sister; Breast cancer (age of onset: 25) in her maternal aunt; Breast cancer (age of onset: 56) in her sister; Breast cancer (age of onset: 58) in her sister; Breast cancer (age of onset: 80) in an other family member; Cancer (age of onset: 1) in an other family member; Cancer (age of onset: 50) in her maternal uncle; Cancer (age of onset: 88) in her paternal grandmother; Colon cancer in her mother; Heart attack in her paternal uncle; Heart attacks under age 50 in her paternal grandfather; Heart attacks under age 50 (age of onset: 30) in her brother; Hypertension in her father; Stroke in her paternal uncle; Stroke (age of  onset: 71) in her father.    Social History     Tobacco Use    Smoking status: Never    Smokeless tobacco: Never   Substance Use Topics    Alcohol use: Yes     Comment: occ    Drug use: No       Review of Systems   Constitutional:  Positive for malaise/fatigue. Negative for chills and fever.   HENT:  Positive for congestion.    Respiratory:  Positive for cough, sputum production, shortness of breath and wheezing.    Cardiovascular:  Negative for chest pain and palpitations.   Gastrointestinal:  Negative for diarrhea, nausea and vomiting.   Genitourinary:  Negative for dysuria.   Neurological:  Negative for dizziness and headaches.        Outpatient Encounter Medications as of 1/3/2024   Medication Sig Dispense Refill    albuterol (PROVENTIL) 2.5 mg /3 mL (0.083 %) nebulizer solution Take 3 mLs (2.5 mg total) by nebulization every 6 (six) hours as needed for Wheezing. Rescue 360 mL 0    albuterol (VENTOLIN HFA) 90 mcg/actuation inhaler Inhale 2 puffs into the lungs every 6 (six) hours as needed for Wheezing. Rescue 18 g 6    DULoxetine (CYMBALTA) 20 MG capsule Take 2 capsules (40 mg total) by mouth once daily. 60 capsule 1    ergocalciferol (ERGOCALCIFEROL) 50,000 unit Cap Take 1 capsule (50,000 Units total) by mouth every 7 days. 24 capsule 1    levothyroxine (SYNTHROID) 50 MCG tablet Take 1 tablet (50 mcg total) by mouth before breakfast. 90 tablet 1    nebivoloL (BYSTOLIC) 10 MG Tab Take 10 mg by mouth once daily.      pantoprazole (PROTONIX) 40 MG tablet Take 1 tablet (40 mg total) by mouth 2 (two) times daily. 180 tablet 1    rosuvastatin (CRESTOR) 40 MG Tab Take 1 tablet (40 mg total) by mouth once daily. 90 tablet 1    semaglutide (OZEMPIC) 0.25 mg or 0.5 mg (2 mg/3 mL) pen injector Inject 0.5 mg into the skin every 7 days. 1 mL 3    SEMAGLUTIDE SUBQ Inject into the skin.      [DISCONTINUED] metFORMIN (GLUCOPHAGE-XR) 500 MG ER 24hr tablet TAKE 1 TABLET(500 MG) BY MOUTH EVERY DAY 90 tablet 0    azithromycin  (Z-JOHN) 250 MG tablet Take 1 tablet (250 mg total) by mouth once daily. Take first 2 tablets together, then 1 every day until finished. (Patient not taking: Reported on 1/3/2024) 6 tablet 0    [] cephALEXin (KEFLEX) 500 MG capsule Take 1 capsule (500 mg total) by mouth every 12 (twelve) hours. for 5 days 10 capsule 0    [] doxycycline (VIBRAMYCIN) 100 MG Cap Take 1 capsule (100 mg total) by mouth 2 (two) times daily. for 7 days 14 capsule 0    [] guaiFENesin-codeine 100-10 mg/5 ml (TUSSI-ORGANIDIN NR)  mg/5 mL syrup Take 5 mLs by mouth 3 (three) times daily as needed for Cough or Congestion. 140 mL 0    methylPREDNISolone (MEDROL DOSEPACK) 4 mg tablet use as directed 21 each 0    [DISCONTINUED] ALPRAZolam (XANAX) 0.25 MG tablet Take 1 tablet (0.25 mg total) by mouth daily as needed for Anxiety. (Patient not taking: Reported on 2023) 90 tablet 0    [DISCONTINUED] clobetasoL-emollient 0.05 % Crea clobetasol-emollient 0.05 % topical cream   SABINO AA BID      [DISCONTINUED] montelukast (SINGULAIR) 10 mg tablet TAKE 1 TABLET(10 MG) BY MOUTH EVERY EVENING (Patient not taking: Reported on 2023) 90 tablet 1    [DISCONTINUED] mupirocin (BACTROBAN) 2 % ointment Apply 1 g topically 2 (two) times daily.      [DISCONTINUED] predniSONE (DELTASONE) 20 MG tablet Take 1 tablet (20 mg total) by mouth once daily. (Patient not taking: Reported on 2023) 5 tablet 0    [DISCONTINUED] predniSONE (DELTASONE) 20 MG tablet Take 2 pills PO daily X 5 days (Patient not taking: Reported on 1/3/2024) 10 tablet 0     No facility-administered encounter medications on file as of 1/3/2024.        Review of patient's allergies indicates:   Allergen Reactions    Coly-mycin m [colistimethate sodium]     Latex, natural rubber Rash       Physical Exam      Vital Signs  Pulse: 84  Resp: 16  SpO2: 98 %  BP: (!) 144/82  BP Location: Right arm  Patient Position: Sitting  Height and Weight  Weight: 135.4 kg (298 lb 8.1  "oz)    Physical Exam  Vitals and nursing note reviewed.   Constitutional:       General: She is not in acute distress.     Appearance: Normal appearance. She is ill-appearing.   HENT:      Head: Normocephalic and atraumatic.      Ears:      Comments: Redness to bilateral ear canals     Nose: Congestion present.      Mouth/Throat:      Mouth: Mucous membranes are moist.      Pharynx: Posterior oropharyngeal erythema present.   Cardiovascular:      Rate and Rhythm: Normal rate and regular rhythm.      Heart sounds: Normal heart sounds.   Pulmonary:      Effort: Pulmonary effort is normal. No respiratory distress.      Breath sounds: Wheezing and rhonchi present.   Musculoskeletal:         General: Normal range of motion.   Lymphadenopathy:      Cervical: Cervical adenopathy present.   Skin:     General: Skin is warm and dry.   Neurological:      General: No focal deficit present.      Mental Status: She is alert and oriented to person, place, and time.   Psychiatric:         Mood and Affect: Mood normal.         Behavior: Behavior normal.         Thought Content: Thought content normal.         Judgment: Judgment normal.          Laboratory:  CBC:  Lab Results   Component Value Date    WBC 9.94 01/03/2024    RBC 5.48 (H) 01/03/2024    HGB 13.6 01/03/2024    HCT 43.0 01/03/2024     01/03/2024    MCV 79 (L) 01/03/2024    MCH 24.8 (L) 01/03/2024    MCHC 31.6 (L) 01/03/2024    MCHC 32.1 12/18/2023    MCHC 30.8 (L) 02/22/2023     CMP:  Lab Results   Component Value Date    GLU 94 01/03/2024    CALCIUM 10.2 01/03/2024    ALBUMIN 3.5 01/03/2024    PROT 7.4 01/03/2024     01/03/2024    K 4.2 01/03/2024    CO2 27 01/03/2024     01/03/2024    BUN 14 01/03/2024    ALKPHOS 91 01/03/2024    ALT 25 01/03/2024    AST 21 01/03/2024    BILITOT 0.5 01/03/2024    BILITOT 0.7 02/22/2023    BILITOT 0.7 03/22/2022     URINALYSIS:  No results found for: "COLORU", "CLARITYU", "SPECGRAV", "PHUR", "PROTEINUA", "GLUCOSEU", " ""BILIRUBINCON", "BLOODU", "WBCU", "RBCU", "BACTERIA", "MUCUS", "NITRITE", "LEUKOCYTESUR", "UROBILINOGEN", "HYALINECASTS"   LIPIDS:  Lab Results   Component Value Date    TSH 2.0 02/18/2006    HDL 66 07/05/2022    HDL 67.0 02/18/2006    CHOL 180 07/05/2022    CHOL 205 (H) 02/18/2006    TRIG 151 (H) 07/05/2022    TRIG 71 02/18/2006    LDLCALC 90 07/05/2022    LDLCALC 123.8 02/18/2006    CHOLHDL 2.73 07/05/2022    CHOLHDL 32.7 02/18/2006    TOTALCHOLEST 3.1 02/18/2006     TSH:  Lab Results   Component Value Date    TSH 2.0 02/18/2006     A1C:  Lab Results   Component Value Date    HGBA1C 6.0 (H) 02/22/2023    HGBA1C 5.6 03/22/2022         Assessment/Plan     Frances Eliasblanc Nani is a 63 y.o.female with:    Bronchitis  -     guaiFENesin-codeine 100-10 mg/5 ml (TUSSI-ORGANIDIN NR)  mg/5 mL syrup; Take 5 mLs by mouth 3 (three) times daily as needed for Cough or Congestion.  Dispense: 140 mL; Refill: 0  -     CBC Auto Differential; Future; Expected date: 01/03/2024  -     doxycycline (VIBRAMYCIN) 100 MG Cap; Take 1 capsule (100 mg total) by mouth 2 (two) times daily. for 7 days  Dispense: 14 capsule; Refill: 0  -     methylPREDNISolone (MEDROL DOSEPACK) 4 mg tablet; use as directed  Dispense: 21 each; Refill: 0  -     Comprehensive Metabolic Panel; Future; Expected date: 01/03/2024    Cough, unspecified type  -     guaiFENesin-codeine 100-10 mg/5 ml (TUSSI-ORGANIDIN NR)  mg/5 mL syrup; Take 5 mLs by mouth 3 (three) times daily as needed for Cough or Congestion.  Dispense: 140 mL; Refill: 0  -     CBC Auto Differential; Future; Expected date: 01/03/2024  -     doxycycline (VIBRAMYCIN) 100 MG Cap; Take 1 capsule (100 mg total) by mouth 2 (two) times daily. for 7 days  Dispense: 14 capsule; Refill: 0  -     methylPREDNISolone (MEDROL DOSEPACK) 4 mg tablet; use as directed  Dispense: 21 each; Refill: 0  -     Comprehensive Metabolic Panel; Future; Expected date: 01/03/2024    SOB (shortness of breath)  -     " guaiFENesin-codeine 100-10 mg/5 ml (TUSSI-ORGANIDIN NR)  mg/5 mL syrup; Take 5 mLs by mouth 3 (three) times daily as needed for Cough or Congestion.  Dispense: 140 mL; Refill: 0  -     CBC Auto Differential; Future; Expected date: 01/03/2024  -     doxycycline (VIBRAMYCIN) 100 MG Cap; Take 1 capsule (100 mg total) by mouth 2 (two) times daily. for 7 days  Dispense: 14 capsule; Refill: 0  -     methylPREDNISolone (MEDROL DOSEPACK) 4 mg tablet; use as directed  Dispense: 21 each; Refill: 0  -     Comprehensive Metabolic Panel; Future; Expected date: 01/03/2024    Mild persistent asthma without complication      Stay hydrated with water, warm tea  Monitor symptoms  Take meds as prescribed  Complete anbx    Health Maintenance Due   Topic Date Due    COVID-19 Vaccine (1) Never done    TETANUS VACCINE  Never done    Aspirin/Antiplatelet Therapy  Never done    Shingles Vaccine (1 of 2) Never done    RSV Vaccine (Age 60+ and Pregnant patients) (1 - 1-dose 60+ series) Never done        I spent 43 minutes on the day of this encounter for preparing for, evaluating, treating, and managing this patient.      -Continue current medications and maintain follow up with specialists.  Return to clinic as needed for any concerns   No follow-ups on file.       VIKAS Rendon  [facility]

## 2024-01-18 DIAGNOSIS — E89.0 POST-SURGICAL HYPOTHYROIDISM: ICD-10-CM

## 2024-01-19 RX ORDER — LEVOTHYROXINE SODIUM 50 UG/1
50 TABLET ORAL
Qty: 90 TABLET | Refills: 1 | Status: SHIPPED | OUTPATIENT
Start: 2024-01-19 | End: 2024-06-02 | Stop reason: SDUPTHER

## 2024-03-10 DIAGNOSIS — F33.8 SEASONAL AFFECTIVE DISORDER: ICD-10-CM

## 2024-03-10 NOTE — TELEPHONE ENCOUNTER
Care Due:                  Date            Visit Type   Department     Provider  --------------------------------------------------------------------------------                                MYCHART                              FOLLOWUP/OF  [facility]  Last Visit: 11-      [facility] VISIT   CARE           Katherine Scott  Next Visit: None Scheduled  None         None Found                                                            Last  Test          Frequency    Reason                     Performed    Due Date  --------------------------------------------------------------------------------    TSH.........  12 months..  levothyroxine............  Not Found    Overdue    [employer] Embedded Care Due Messages. Reference number: [id number].   3/10/2024 12:29:19 PM CDT

## 2024-03-11 RX ORDER — DULOXETIN HYDROCHLORIDE 20 MG/1
40 CAPSULE, DELAYED RELEASE ORAL DAILY
Qty: 60 CAPSULE | Refills: 1 | Status: SHIPPED | OUTPATIENT
Start: 2024-03-11 | End: 2024-05-06

## 2024-03-18 ENCOUNTER — PATIENT MESSAGE (OUTPATIENT)
Dept: PRIMARY CARE CLINIC | Facility: CLINIC | Age: 64
End: 2024-03-18
Payer: COMMERCIAL

## 2024-04-02 ENCOUNTER — TELEPHONE (OUTPATIENT)
Dept: PHARMACY | Facility: CLINIC | Age: 64
End: 2024-04-02
Payer: COMMERCIAL

## 2024-04-22 ENCOUNTER — OFFICE VISIT (OUTPATIENT)
Dept: INTERNAL MEDICINE | Facility: CLINIC | Age: 64
End: 2024-04-22
Payer: COMMERCIAL

## 2024-04-22 VITALS
DIASTOLIC BLOOD PRESSURE: 82 MMHG | HEIGHT: 65 IN | SYSTOLIC BLOOD PRESSURE: 140 MMHG | OXYGEN SATURATION: 97 % | WEIGHT: 293 LBS | BODY MASS INDEX: 48.82 KG/M2 | HEART RATE: 82 BPM

## 2024-04-22 DIAGNOSIS — I10 UNCONTROLLED HYPERTENSION: ICD-10-CM

## 2024-04-22 DIAGNOSIS — J06.9 VIRAL URI WITH COUGH: Primary | ICD-10-CM

## 2024-04-22 PROCEDURE — 3008F BODY MASS INDEX DOCD: CPT | Mod: CPTII,S$GLB,, | Performed by: INTERNAL MEDICINE

## 2024-04-22 PROCEDURE — 3077F SYST BP >= 140 MM HG: CPT | Mod: CPTII,S$GLB,, | Performed by: INTERNAL MEDICINE

## 2024-04-22 PROCEDURE — 3079F DIAST BP 80-89 MM HG: CPT | Mod: CPTII,S$GLB,, | Performed by: INTERNAL MEDICINE

## 2024-04-22 PROCEDURE — 1159F MED LIST DOCD IN RCRD: CPT | Mod: CPTII,S$GLB,, | Performed by: INTERNAL MEDICINE

## 2024-04-22 PROCEDURE — 99213 OFFICE O/P EST LOW 20 MIN: CPT | Mod: S$GLB,,, | Performed by: INTERNAL MEDICINE

## 2024-04-22 PROCEDURE — 99999 PR PBB SHADOW E&M-EST. PATIENT-LVL IV: CPT | Mod: PBBFAC,,, | Performed by: INTERNAL MEDICINE

## 2024-04-22 NOTE — PROGRESS NOTES
"Poc stretSubjective     Patient ID: Frances Cardoza is a 63 y.o. female.    Chief Complaint: Otalgia, Sore Throat, and Cough    Otalgia   Associated symptoms include coughing and a sore throat.   Sore Throat   Associated symptoms include coughing and ear pain.   Cough  Associated symptoms include ear pain and a sore throat.     A couple of weeks ago felt poorly, then returned.  L ear is painful when lying.  Hears a  "woosh " when she sleeps.  Discolored nasal drainage and sputum x  days.  No fever, but has had chills.  No chest congestion. Cough just started this am.  Not wheezing.     Review of Systems   HENT:  Positive for ear pain and sore throat.    Respiratory:  Positive for cough.           Objective     Physical Exam  Constitutional:       General: She is not in acute distress.     Appearance: She is well-developed.   HENT:      Head: Normocephalic and atraumatic.      Right Ear: Tympanic membrane and ear canal normal.      Left Ear: Tympanic membrane and ear canal normal.      Mouth/Throat:      Pharynx: No oropharyngeal exudate.   Cardiovascular:      Rate and Rhythm: Normal rate and regular rhythm.   Pulmonary:      Effort: Pulmonary effort is normal. No respiratory distress.      Breath sounds: Normal breath sounds. No stridor. No wheezing, rhonchi or rales.   Musculoskeletal:      Cervical back: Neck supple.      Right lower leg: No edema.      Left lower leg: No edema.   Lymphadenopathy:      Cervical: No cervical adenopathy.   Neurological:      Mental Status: She is alert and oriented to person, place, and time.   Psychiatric:         Mood and Affect: Mood normal.         Behavior: Behavior normal.         Thought Content: Thought content normal.            Assessment and Plan     1. Viral URI with cough  -     POCT Strep A, Molecular  -     POCT Influenza A/B Molecular  -     POCT COVID-19 Rapid Screening    2. Uncontrolled hypertension          She will join dig htn program.  F/u PCP  See " pt instructions   Call if no better.       No follow-ups on file.

## 2024-05-05 DIAGNOSIS — F33.8 SEASONAL AFFECTIVE DISORDER: ICD-10-CM

## 2024-05-05 NOTE — TELEPHONE ENCOUNTER
No care due was identified.  [facility] Embedded Care Due Messages. Reference number: [id number].   5/05/2024 9:27:06 AM CDT

## 2024-05-06 RX ORDER — DULOXETIN HYDROCHLORIDE 20 MG/1
CAPSULE, DELAYED RELEASE ORAL
Qty: 60 CAPSULE | Refills: 1 | Status: SHIPPED | OUTPATIENT
Start: 2024-05-06

## 2024-06-02 DIAGNOSIS — E78.2 MIXED HYPERLIPIDEMIA: ICD-10-CM

## 2024-06-02 DIAGNOSIS — K21.9 GASTROESOPHAGEAL REFLUX DISEASE, UNSPECIFIED WHETHER ESOPHAGITIS PRESENT: ICD-10-CM

## 2024-06-02 DIAGNOSIS — I25.10 CORONARY ARTERY DISEASE INVOLVING NATIVE CORONARY ARTERY OF NATIVE HEART WITHOUT ANGINA PECTORIS: ICD-10-CM

## 2024-06-02 DIAGNOSIS — I65.29 CAROTID ATHEROSCLEROSIS, UNSPECIFIED LATERALITY: ICD-10-CM

## 2024-06-02 DIAGNOSIS — E89.0 POST-SURGICAL HYPOTHYROIDISM: ICD-10-CM

## 2024-06-02 NOTE — TELEPHONE ENCOUNTER
Care Due:                  Date            Visit Type   Department     Provider  --------------------------------------------------------------------------------                                MYCHART                              FOLLOWUP/OF  [facility]  Last Visit: 11-      FICE VISIT   CARE           Katherine Scott  Next Visit: None Scheduled  None         None Found                                                            Last  Test          Frequency    Reason                     Performed    Due Date  --------------------------------------------------------------------------------    TSH.........  12 months..  levothyroxine............  Not Found    Overdue    [employer] Embedded Care Due Messages. Reference number: [id number].   6/02/2024 12:54:56 PM CDT

## 2024-06-03 RX ORDER — LEVOTHYROXINE SODIUM 50 UG/1
50 TABLET ORAL
Qty: 90 TABLET | Refills: 1 | Status: SHIPPED | OUTPATIENT
Start: 2024-06-03

## 2024-06-03 RX ORDER — PANTOPRAZOLE SODIUM 40 MG/1
40 TABLET, DELAYED RELEASE ORAL 2 TIMES DAILY
Qty: 180 TABLET | Refills: 1 | Status: SHIPPED | OUTPATIENT
Start: 2024-06-03

## 2024-06-03 RX ORDER — ROSUVASTATIN CALCIUM 40 MG/1
40 TABLET, COATED ORAL DAILY
Qty: 90 TABLET | Refills: 1 | Status: SHIPPED | OUTPATIENT
Start: 2024-06-03

## 2024-06-06 ENCOUNTER — PATIENT MESSAGE (OUTPATIENT)
Dept: PRIMARY CARE CLINIC | Facility: CLINIC | Age: 64
End: 2024-06-06
Payer: COMMERCIAL

## 2024-06-20 ENCOUNTER — HOSPITAL ENCOUNTER (OUTPATIENT)
Dept: RADIOLOGY | Facility: HOSPITAL | Age: 64
Discharge: HOME | End: 2024-06-20
Attending: NURSE PRACTITIONER
Payer: COMMERCIAL

## 2024-06-20 ENCOUNTER — PATIENT MESSAGE (OUTPATIENT)
Dept: PRIMARY CARE CLINIC | Facility: CLINIC | Age: 64
End: 2024-06-20

## 2024-06-20 ENCOUNTER — OFFICE VISIT (OUTPATIENT)
Dept: PRIMARY CARE CLINIC | Facility: CLINIC | Age: 64
End: 2024-06-20
Payer: COMMERCIAL

## 2024-06-20 VITALS
OXYGEN SATURATION: 98 % | BODY MASS INDEX: 51.8 KG/M2 | SYSTOLIC BLOOD PRESSURE: 142 MMHG | WEIGHT: 293 LBS | DIASTOLIC BLOOD PRESSURE: 80 MMHG | HEART RATE: 77 BPM

## 2024-06-20 DIAGNOSIS — Z01.419 WELL WOMAN EXAM: ICD-10-CM
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
## 2024-06-20 DIAGNOSIS — S09.90XA HEAD TRAUMA, INITIAL ENCOUNTER: ICD-10-CM

## 2024-06-20 DIAGNOSIS — I25.10 CORONARY ARTERY DISEASE INVOLVING NATIVE CORONARY ARTERY OF NATIVE HEART WITHOUT ANGINA PECTORIS: ICD-10-CM

## 2024-06-20 DIAGNOSIS — M25.532 BILATERAL WRIST PAIN: ICD-10-CM

## 2024-06-20 DIAGNOSIS — I65.29 CAROTID ATHEROSCLEROSIS, UNSPECIFIED LATERALITY: ICD-10-CM

## 2024-06-20 DIAGNOSIS — H57.12 LEFT EYE PAIN: ICD-10-CM

## 2024-06-20 DIAGNOSIS — M25.531 BILATERAL WRIST PAIN: ICD-10-CM

## 2024-06-20 DIAGNOSIS — I10 HYPERTENSION, BENIGN: ICD-10-CM

## 2024-06-20 DIAGNOSIS — S09.90XA HEAD TRAUMA, INITIAL ENCOUNTER: Primary | ICD-10-CM

## 2024-06-20 PROCEDURE — 73110 X-RAY EXAM OF WRIST: CPT | Mod: TC,50

## 2024-06-20 PROCEDURE — 3079F DIAST BP 80-89 MM HG: CPT | Mod: CPTII,S$GLB,, | Performed by: NURSE PRACTITIONER

## 2024-06-20 PROCEDURE — 99214 OFFICE O/P EST MOD 30 MIN: CPT | Mod: S$GLB,,, | Performed by: NURSE PRACTITIONER

## 2024-06-20 PROCEDURE — 99999 PR PBB SHADOW E&M-EST. PATIENT-LVL V: CPT | Mod: PBBFAC,,, | Performed by: NURSE PRACTITIONER

## 2024-06-20 PROCEDURE — 1159F MED LIST DOCD IN RCRD: CPT | Mod: CPTII,S$GLB,, | Performed by: NURSE PRACTITIONER

## 2024-06-20 PROCEDURE — 70200 X-RAY EXAM OF EYE SOCKETS: CPT | Mod: TC

## 2024-06-20 PROCEDURE — 71046 X-RAY EXAM CHEST 2 VIEWS: CPT | Mod: TC

## 2024-06-20 PROCEDURE — 3008F BODY MASS INDEX DOCD: CPT | Mod: CPTII,S$GLB,, | Performed by: NURSE PRACTITIONER

## 2024-06-20 PROCEDURE — 3077F SYST BP >= 140 MM HG: CPT | Mod: CPTII,S$GLB,, | Performed by: NURSE PRACTITIONER

## 2024-06-20 PROCEDURE — 70140 X-RAY EXAM OF FACIAL BONES: CPT | Mod: TC

## 2024-06-20 RX ORDER — HYDROCHLOROTHIAZIDE 12.5 MG/1
12.5 TABLET ORAL DAILY
Qty: 30 TABLET | Refills: 1 | Status: SHIPPED | OUTPATIENT
Start: 2024-06-20 | End: 2025-06-20

## 2024-06-20 NOTE — PROGRESS NOTES
[facility] Note    Chief Complaint    No chief complaint on file.      History of Present Illness      Frances Cardoza is a 63 y.o. female with chronic conditions of hld, htn, seasonal affective disorder, plantar fascitis, who presents today for: reports fallen twice in the past 6 weeks. First time, tripped over blanket its a long blanket on bed and some of drapes on the floor; tripped with same blanket but did not hit head.   Second time, tripped over blanket hit head on nightstand, passed out, declined going to the ER. Pt reports having bruising on left eye and left forehead. Still complaining of right wrist and left wrist pain, but right worse than left. Denies chest pain/sob.   No visual changes. Has had more left frontal headaches, has affected sleep. Has been taken aleve.     Htn: slightly elevated today. Does have issues with LE swelling on occasion, will do hctz for now. Was told not to take Ace/Arb from previous cardiology?       Past Medical History:  Past Medical History:   Diagnosis Date    Genetic testing 12/2022    negative, [facility] 84-gene Multi-Cancer +RNA panel    GERD (gastroesophageal reflux disease)     High cholesterol     Hypertension     Plantar fasciitis     Post-surgical hypothyroidism 1997    Seasonal affective disorder 11/10/2022       Past Surgical History:   has a past surgical history that includes Hernia repair (1997); Total thyroidectomy (Bilateral, 1997); Tonsillectomy (1977); and uterine ablation (2010).    Family History:  family history includes BRCA 1/2 in her maternal aunt, sister, and sister; Breast cancer (age of onset: 25) in her maternal aunt; Breast cancer (age of onset: 56) in her sister; Breast cancer (age of onset: 58) in her sister; Breast cancer (age of onset: 80) in an other family member; Cancer (age of onset: 1) in an other family member; Cancer (age of onset: 50) in her maternal uncle; Cancer (age of onset: 88) in her paternal grandmother;  Colon cancer in her mother; Heart attack in her paternal uncle; Heart attacks under age 50 in her paternal grandfather; Heart attacks under age 50 (age of onset: 30) in her brother; Hypertension in her father; Stroke in her paternal uncle; Stroke (age of onset: 71) in her father.     Social History:  Social History     Tobacco Use    Smoking status: Never    Smokeless tobacco: Never   Substance Use Topics    Alcohol use: Yes     Comment: occ    Drug use: No       Review of Systems   Constitutional:  Negative for chills and fever.   Respiratory:  Negative for cough and shortness of breath.    Cardiovascular:  Negative for chest pain and palpitations.   Gastrointestinal:  Negative for constipation, diarrhea, nausea and vomiting.   Genitourinary:  Negative for dysuria and hematuria.   Musculoskeletal:  Positive for falls.   Neurological:  Negative for headaches.        Medications:  Outpatient Encounter Medications as of 6/20/2024   Medication Sig Dispense Refill    albuterol (VENTOLIN HFA) 90 mcg/actuation inhaler Inhale 2 puffs into the lungs every 6 (six) hours as needed for Wheezing. Rescue 18 g 6    DULoxetine (CYMBALTA) 20 MG capsule TAKE 2 CAPSULES(40 MG) BY MOUTH DAILY 60 capsule 1    ergocalciferol (ERGOCALCIFEROL) 50,000 unit Cap Take 1 capsule (50,000 Units total) by mouth every 7 days. 24 capsule 1    hydroCHLOROthiazide (HYDRODIURIL) 12.5 MG Tab Take 1 tablet (12.5 mg total) by mouth once daily. 30 tablet 1    levothyroxine (SYNTHROID) 50 MCG tablet Take 1 tablet (50 mcg total) by mouth before breakfast. 90 tablet 1    nebivoloL (BYSTOLIC) 10 MG Tab Take 10 mg by mouth once daily.      pantoprazole (PROTONIX) 40 MG tablet Take 1 tablet (40 mg total) by mouth 2 (two) times daily. 180 tablet 1    rosuvastatin (CRESTOR) 40 MG Tab Take 1 tablet (40 mg total) by mouth once daily. 90 tablet 1     No facility-administered encounter medications on file as of 6/20/2024.       Allergies:  Review of patient's  allergies indicates:   Allergen Reactions    Coly-mycin m [colistimethate sodium]     Latex, natural rubber Rash       Health Maintenance:  Immunization History   Administered Date(s) Administered    Influenza 11/15/2022    Influenza - Quadrivalent - MDCK - PF 11/03/2022    Influenza - Quadrivalent - PF *Preferred* (6 months and older) 10/25/2018, 10/26/2019, 10/20/2023    Pneumococcal Conjugate - 20 Valent 11/03/2022      Health Maintenance   Topic Date Due    TETANUS VACCINE  Never done    Aspirin/Antiplatelet Therapy  Never done    Shingles Vaccine (1 of 2) Never done    Mammogram  04/25/2024    Lipid Panel  07/05/2027    Colorectal Cancer Screening  04/28/2033    Hepatitis C Screening  Completed        Physical Exam      Vital Signs  Pulse: 77  SpO2: 98 %  BP: (!) 142/80  BP Location: Right arm  Pain Score:   6  Height and Weight  Weight: (!) 141.2 kg (311 lb 4.6 oz)]    Physical Exam  Constitutional:       Appearance: She is well-developed.   HENT:      Head: Normocephalic and atraumatic.      Left Ear: Tympanic membrane normal.      Nose: Nose normal.   Eyes:      Extraocular Movements: Extraocular movements intact.      Pupils: Pupils are equal, round, and reactive to light.   Cardiovascular:      Rate and Rhythm: Normal rate and regular rhythm.      Pulses: Normal pulses.      Heart sounds: Normal heart sounds. No murmur heard.  Pulmonary:      Effort: Pulmonary effort is normal. No respiratory distress.      Breath sounds: Normal breath sounds.   Abdominal:      General: There is no distension.      Palpations: Abdomen is soft.      Tenderness: There is no abdominal tenderness. There is no guarding.   Musculoskeletal:         General: Tenderness (right and left wrist) present. Normal range of motion.   Skin:     General: Skin is warm and dry.   Neurological:      General: No focal deficit present.      Mental Status: She is alert and oriented to person, place, and time. Mental status is at baseline.    Psychiatric:         Mood and Affect: Mood normal.         Behavior: Behavior normal.          Laboratory:  CBC:  Recent Labs   Lab 02/22/23  1021 12/18/23  1852 01/03/24  1500   WBC 8.74 9.25 9.94   RBC 5.07 5.44 H 5.48 H   Hemoglobin 13.2 13.4 13.6   Hematocrit 42.8 41.8 43.0   Platelets 273 268 278   MCV 84 77 L 79 L   MCH 26.0 L 24.6 L 24.8 L   MCHC 30.8 L 32.1 31.6 L     CMP:  Recent Labs   Lab 03/22/22  0827 02/22/23  1021 12/18/23  1852 01/03/24  1500   Glucose 110 116 H   < > 94   Calcium 10.0 9.7   < > 10.2   Albumin 3.4 L 3.5  --  3.5   Total Protein 7.2 7.2  --  7.4   Sodium 140 140   < > 142   Potassium 4.4 4.1   < > 4.2   CO2 29 27   < > 27   Chloride 103 104   < > 104   BUN 16 13   < > 14   Alkaline Phosphatase 81 99  --  91   ALT 24 27  --  25   AST 17 21  --  21   Total Bilirubin 0.7 0.7  --  0.5    < > = values in this interval not displayed.     URINALYSIS:       LIPIDS:  Recent Labs   Lab 07/05/22  0736   HDL 66   Cholesterol 180   Triglycerides 151 H   LDL Calculated 90   Hdl/Cholesterol Ratio 2.73     TSH:      A1C:  Recent Labs   Lab 07/24/21  0000 03/22/22  0827 07/05/22  0736 02/22/23  1021   Hemoglobin A1C 5.7 5.6 5.8 H 6.0 H       Assessment/Plan     Frances Cardoza is a 63 y.o.female with:    1. Head trauma, initial encounter  - CT Head Without Contrast; Future  - X-Ray Facial Bones Limited 1-2 View; Future  - X-Ray Orbits  Min 4 Views; Future    2. Bilateral wrist pain  - X-Ray Wrist Complete 3 views Bilateral; Future    3. Fall, initial encounter  - CT Head Without Contrast; Future  - X-Ray Wrist Complete 3 views Bilateral; Future  - X-Ray Facial Bones Limited 1-2 View; Future  - X-Ray Chest PA And Lateral; Future  - X-Ray Orbits  Min 4 Views; Future    4. Coronary artery disease involving native coronary artery of native heart without angina pectoris  - Ambulatory referral/consult to Cardiology; Future    5. Carotid atherosclerosis, unspecified laterality  - Ambulatory  referral/consult to Cardiology; Future    6. Well woman exam  - Ambulatory referral/consult to Gynecology; Future    7. Hypertension, benign  - Ambulatory referral/consult to Cardiology; Future  - hydroCHLOROthiazide (HYDRODIURIL) 12.5 MG Tab; Take 1 tablet (12.5 mg total) by mouth once daily.  Dispense: 30 tablet; Refill: 1    8. Left eye pain  - X-Ray Orbits  Min 4 Views; Future       Chronic conditions status updated as per HPI.  Other than changes above, cont current medications and maintain follow up with specialists.  No follow-ups on file.    Future Appointments   Date Time Provider Department Center   6/20/2024 10:30 AM [facility]  XR[facility] [place]   6/20/2024 10:45 AM [facility]  XR[facility] [place]   6/20/2024 11:00 AM [facility]  XR[facility] 700[facility]IT [facility] [place]       Adrienne Cotaya, FNP [facility]

## 2024-06-21 ENCOUNTER — HOSPITAL ENCOUNTER (OUTPATIENT)
Dept: RADIOLOGY | Facility: HOSPITAL | Age: 64
Discharge: HOME | End: 2024-06-21
Attending: NURSE PRACTITIONER
Payer: COMMERCIAL

## 2024-06-21 DIAGNOSIS — M25.531 BILATERAL WRIST PAIN: Primary | ICD-10-CM

## 2024-06-21 DIAGNOSIS — M25.532 BILATERAL WRIST PAIN: Primary | ICD-10-CM

## 2024-06-21 DIAGNOSIS — S09.90XA HEAD TRAUMA, INITIAL ENCOUNTER: ICD-10-CM
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
## 2024-06-21 PROCEDURE — 70450 CT HEAD/BRAIN W/O DYE: CPT | Mod: 26,,, | Performed by: STUDENT IN AN ORGANIZED HEALTH CARE EDUCATION/TRAINING PROGRAM

## 2024-06-21 PROCEDURE — 70450 CT HEAD/BRAIN W/O DYE: CPT | Mod: TC

## 2024-06-21 RX ORDER — IBUPROFEN 600 MG/1
600 TABLET ORAL EVERY 8 HOURS PRN
Qty: 30 TABLET | Refills: 0 | Status: SHIPPED | OUTPATIENT
Start: 2024-06-21

## 2024-06-24 ENCOUNTER — TELEPHONE (OUTPATIENT)
Dept: OBSTETRICS AND GYNECOLOGY | Facility: CLINIC | Age: 64
End: 2024-06-24
Payer: COMMERCIAL

## 2024-06-24 DIAGNOSIS — Z12.31 OTHER SCREENING MAMMOGRAM: Primary | ICD-10-CM

## 2024-07-15 ENCOUNTER — PATIENT MESSAGE (OUTPATIENT)
Dept: PRIMARY CARE CLINIC | Facility: CLINIC | Age: 64
End: 2024-07-15
Payer: COMMERCIAL

## 2024-07-15 ENCOUNTER — HOSPITAL ENCOUNTER (EMERGENCY)
Facility: HOSPITAL | Age: 64
Discharge: HOME | End: 2024-07-15
Attending: EMERGENCY MEDICINE
Payer: COMMERCIAL

## 2024-07-15 VITALS
TEMPERATURE: 98 F | HEART RATE: 88 BPM | DIASTOLIC BLOOD PRESSURE: 88 MMHG | SYSTOLIC BLOOD PRESSURE: 148 MMHG | OXYGEN SATURATION: 96 % | WEIGHT: 293 LBS | HEIGHT: 65 IN | RESPIRATION RATE: 19 BRPM | BODY MASS INDEX: 48.82 KG/M2

## 2024-07-15 DIAGNOSIS — U07.1 COVID-19: Primary | ICD-10-CM

## 2024-07-15 DIAGNOSIS — R07.89 CHEST DISCOMFORT: ICD-10-CM

## 2024-07-15 DIAGNOSIS — U07.1 COVID-19 VIRUS DETECTED: ICD-10-CM

## 2024-07-15 LAB
BASOPHILS # BLD AUTO: 0.04 K/UL (ref 0–0.2)
BASOPHILS NFR BLD: 0.7 % (ref 0–1.9)
BUN SERPL-MCNC: 9 MG/DL (ref 6–30)
CHLORIDE SERPL-SCNC: 103 MMOL/L (ref 95–110)
CREAT SERPL-MCNC: 0.9 MG/DL (ref 0.5–1.4)
DIFFERENTIAL METHOD BLD: ABNORMAL
EOSINOPHIL # BLD AUTO: 0 K/UL (ref 0–0.5)
EOSINOPHIL NFR BLD: 0.4 % (ref 0–8)
ERYTHROCYTE [DISTWIDTH] IN BLOOD BY AUTOMATED COUNT: 17.1 % (ref 11.5–14.5)
GLUCOSE SERPL-MCNC: 128 MG/DL (ref 70–110)
HCT VFR BLD AUTO: 45.4 % (ref 37–48.5)
HCT VFR BLD CALC: 44 %PCV (ref 36–54)
HCV AB SERPL QL IA: NORMAL
HGB BLD-MCNC: 13.9 G/DL (ref 12–16)
HIV 1+2 AB+HIV1 P24 AG SERPL QL IA: NORMAL
IMM GRANULOCYTES # BLD AUTO: 0.03 K/UL (ref 0–0.04)
IMM GRANULOCYTES NFR BLD AUTO: 0.6 % (ref 0–0.5)
LYMPHOCYTES # BLD AUTO: 1.1 K/UL (ref 1–4.8)
LYMPHOCYTES NFR BLD: 20.9 % (ref 18–48)
MCH RBC QN AUTO: 24.5 PG (ref 27–31)
MCHC RBC AUTO-ENTMCNC: 30.6 G/DL (ref 32–36)
MCV RBC AUTO: 80 FL (ref 82–98)
MONOCYTES # BLD AUTO: 0.6 K/UL (ref 0.3–1)
MONOCYTES NFR BLD: 10.4 % (ref 4–15)
NEUTROPHILS # BLD AUTO: 3.6 K/UL (ref 1.8–7.7)
NEUTROPHILS NFR BLD: 67 % (ref 38–73)
NRBC BLD-RTO: 0 /100 WBC
OHS QRS DURATION: 140 MS
OHS QTC CALCULATION: 491 MS
PLATELET # BLD AUTO: 202 K/UL (ref 150–450)
PMV BLD AUTO: 8.7 FL (ref 9.2–12.9)
POC IONIZED CALCIUM: 1.15 MMOL/L (ref 1.06–1.42)
POC TCO2 (MEASURED): 22 MMOL/L (ref 23–29)
POTASSIUM BLD-SCNC: 4.1 MMOL/L (ref 3.5–5.1)
RBC # BLD AUTO: 5.67 M/UL (ref 4–5.4)
SAMPLE: ABNORMAL
SARS-COV-2 RDRP RESP QL NAA+PROBE: POSITIVE
SODIUM BLD-SCNC: 136 MMOL/L (ref 136–145)
WBC # BLD AUTO: 5.37 K/UL (ref 3.9–12.7)

## 2024-07-15 PROCEDURE — 86803 HEPATITIS C AB TEST: CPT | Performed by: PHYSICIAN ASSISTANT

## 2024-07-15 PROCEDURE — 80047 BASIC METABLC PNL IONIZED CA: CPT

## 2024-07-15 PROCEDURE — 96360 HYDRATION IV INFUSION INIT: CPT

## 2024-07-15 PROCEDURE — U0002 COVID-19 LAB TEST NON-CDC: HCPCS | Performed by: EMERGENCY MEDICINE

## 2024-07-15 PROCEDURE — 94640 AIRWAY INHALATION TREATMENT: CPT

## 2024-07-15 PROCEDURE — 93010 ELECTROCARDIOGRAM REPORT: CPT | Mod: ,,, | Performed by: INTERNAL MEDICINE

## 2024-07-15 PROCEDURE — 96361 HYDRATE IV INFUSION ADD-ON: CPT

## 2024-07-15 PROCEDURE — 25000242 PHARM REV CODE 250 ALT 637 W/ HCPCS: Performed by: EMERGENCY MEDICINE

## 2024-07-15 PROCEDURE — 85025 COMPLETE CBC W/AUTO DIFF WBC: CPT | Performed by: EMERGENCY MEDICINE

## 2024-07-15 PROCEDURE — 63600175 PHARM REV CODE 636 W HCPCS: Performed by: EMERGENCY MEDICINE

## 2024-07-15 PROCEDURE — 87389 HIV-1 AG W/HIV-1&-2 AB AG IA: CPT | Performed by: PHYSICIAN ASSISTANT

## 2024-07-15 PROCEDURE — 94761 N-INVAS EAR/PLS OXIMETRY MLT: CPT

## 2024-07-15 PROCEDURE — 99285 EMERGENCY DEPT VISIT HI MDM: CPT | Mod: 25

## 2024-07-15 PROCEDURE — 93005 ELECTROCARDIOGRAM TRACING: CPT

## 2024-07-15 RX ORDER — IPRATROPIUM BROMIDE AND ALBUTEROL SULFATE 2.5; .5 MG/3ML; MG/3ML
3 SOLUTION RESPIRATORY (INHALATION)
Status: COMPLETED | OUTPATIENT
Start: 2024-07-15 | End: 2024-07-15

## 2024-07-15 RX ADMIN — IPRATROPIUM BROMIDE AND ALBUTEROL SULFATE 3 ML: 2.5; .5 SOLUTION RESPIRATORY (INHALATION) at 09:07

## 2024-07-15 RX ADMIN — SODIUM CHLORIDE, POTASSIUM CHLORIDE, SODIUM LACTATE AND CALCIUM CHLORIDE 1000 ML: 600; 310; 30; 20 INJECTION, SOLUTION INTRAVENOUS at 09:07

## 2024-07-15 NOTE — ED TRIAGE NOTES
Arrives via POV, reports Chest pain, SOB, coughing, ear/throat pain, rib and back pain from coughing. Negative at home COVID test. Headache 10/10. Past history of asthma, bronchitis, pneumonia.

## 2024-07-15 NOTE — ED PROVIDER NOTES
Encounter Date: 7/15/2024       History     Chief Complaint   Patient presents with    URI     Hx asthma and bronchitis, cough up whitish since Friday evening, ha, whole body hurting    Chest Pain    Shortness of Breath     Frances Cardoza is a 64-year-old female with a past medical history of CAD, GERD, hypertension, migraines, asthma presenting today with generalized body aches, subjective fevers, cough since Friday.  Reports decreased p.o. intake since Saturday.  She is sleeping more than usual.  She is tried her nebulizer without significant relief.  No abdominal pain, dysuria, hematuria, diarrhea.  Possible sick contact at work.      Review of patient's allergies indicates:   Allergen Reactions    Coly-mycin m [colistimethate sodium]     Latex, natural rubber Rash     Past Medical History:   Diagnosis Date    Arthritis     Asthma     Coronary artery disease     Genetic testing 12/2022    negative, [employer] 84-gene Multi-Cancer +RNA panel    GERD (gastroesophageal reflux disease)     High cholesterol     Hypertension     Migraine headache     Plantar fasciitis     Post-surgical hypothyroidism 1997    Seasonal affective disorder 11/10/2022     Past Surgical History:   Procedure Laterality Date    HERNIA REPAIR  1997    TONSILLECTOMY  1977    TOTAL THYROIDECTOMY Bilateral 1997    due to rapid growth of a benign nodule    uterine ablation  2010    for menorrhagia     Family History   Problem Relation Name Age of Onset    Colon cancer Mother Lenore         mets to lung, etc    Stroke Father Hermann Sr 71    Hypertension Father Hermann Sr     BRCA 1/2 Sister Marilu     Breast cancer Sister Marilu 58        s/p bilat mast    BRCA 1/2 Sister Jamila     Breast cancer Sister Jamila 56        s/p bilat mast    Heart attacks under age 50 Brother Hermann 30        x2    Heart attacks under age 50 Paternal Grandfather      BRCA 1/2 Maternal Aunt Emily     Breast cancer Maternal Aunt Emily 25    Stroke Paternal Uncle Edwin     Heart attack  Paternal Uncle Edwin     Cancer Paternal Grandmother  88        soft tissue of her upper arm    Cancer Other Schuyler 1        rare form of cancer only elderly people get, only seen in 5 children world wide, at base of neck    Cancer Maternal Uncle Polly 50        prostate?    Breast cancer Other MGGM 80        s/p bilat mastectomy     Social History     Tobacco Use    Smoking status: Never    Smokeless tobacco: Never   Substance Use Topics    Drug use: No     Review of Systems    Physical Exam     Initial Vitals [07/15/24 0804]   BP Pulse Resp Temp SpO2   132/84 89 20 98.4 °F (36.9 °C) 96 %      MAP       --         Physical Exam    Nursing note and vitals reviewed.  Constitutional: She appears well-developed and well-nourished. No distress.   HENT:   Mouth/Throat: Oropharynx is clear and moist.   Eyes: Conjunctivae are normal. No scleral icterus.   Neck: No JVD present.   Cardiovascular:  Normal rate, regular rhythm and intact distal pulses.           Pulmonary/Chest: Breath sounds normal. No respiratory distress. She has no wheezes. She has no rales.   Abdominal: Abdomen is soft. Bowel sounds are normal. She exhibits no distension. There is no abdominal tenderness. There is no rebound.   Musculoskeletal:         General: No edema.     Lymphadenopathy:     She has no cervical adenopathy.   Neurological: She is alert and oriented to person, place, and time.   Skin: Skin is warm. No rash noted.         ED Course   Procedures  Labs Reviewed   SARS-COV-2 RNA AMPLIFICATION, QUAL - Abnormal; Notable for the following components:       Result Value    SARS-CoV-2 RNA, Amplification, Qual Positive (*)     All other components within normal limits   CBC W/ AUTO DIFFERENTIAL - Abnormal; Notable for the following components:    RBC 5.67 (*)     MCV 80 (*)     MCH 24.5 (*)     MCHC 30.6 (*)     RDW 17.1 (*)     MPV 8.7 (*)     Immature Granulocytes 0.6 (*)     All other components within normal limits   ISTAT PROCEDURE - Abnormal;  Notable for the following components:    POC Glucose 128 (*)     POC TCO2 (MEASURED) 22 (*)     All other components within normal limits   HIV 1 / 2 ANTIBODY    Narrative:     Release to patient->Immediate   HEPATITIS C ANTIBODY    Narrative:     Release to patient->Immediate   ISTAT CHEM8     EKG Readings: (Independently Interpreted)   Previous EKG: Compared with most recent EKG Previous EKG Date: 12/18/2023.   EKG: Sinus rhythm at 89 with a right bundle-branch block, no acute ischemic changes       Imaging Results              X-Ray Chest PA And Lateral (Final result)  Result time 07/15/24 09:47:11      Final result by Dru Hernandez MD (07/15/24 09:47:11)                   Impression:      See above      Electronically signed by: Dru Hernandez MD  Date:    07/15/2024  Time:    09:47               Narrative:    EXAMINATION:  XR CHEST PA AND LATERAL    CLINICAL HISTORY:  chest pain;    TECHNIQUE:  PA and lateral views of the chest were performed.    COMPARISON:  Non 06/20/2024 e    FINDINGS:  Heart size normal.  There is a large hiatal hernia identified as before.  The lungs are clear.  No pleural effusion.                                       Medications   lactated ringers bolus 1,000 mL (1,000 mLs Intravenous New Bag 7/15/24 0928)   albuterol-ipratropium 2.5 mg-0.5 mg/3 mL nebulizer solution 3 mL (3 mLs Nebulization Given 7/15/24 0949)     Medical Decision Making  Emergent evaluation a 64-year-old female presenting with cough, congestion, body aches since Friday.    Vital signs stable.  Overall well-appearing, no acute distress.    Differential includes but not limited to COVID, pneumonia, bronchitis, viral syndrome    Plan:  COVID  Chest x-ray  Basic labs  IV fluids  Nebulizer    Amount and/or Complexity of Data Reviewed  Labs: ordered. Decision-making details documented in ED Course.  Radiology: ordered.    Risk  Prescription drug management.               ED Course as of 07/15/24 1028   Mon Jul 15, 2024    0938 WBC: 5.37 [GM]   0939 Hemoglobin: 13.9 [GM]   0939 POC Glucose(!): 128 [GM]   0939 POC BUN: 9 [GM]   0939 POC Creatinine: 0.9 [GM]   1010 SARS-CoV-2 RNA, Amplification, Qual(!): Positive [GM]   1010 Chest x-ray reviewed and independently interpreted by me as no focal consolidation or effusion.  There is a large hiatal hernia.    Labs reviewed with no leukocytosis.  Hemoglobin stable.  I-STAT unremarkable.    COVID positive.  Day for symptoms, no indication for treatment with antiviral.    Recommend continue supportive therapy with Tylenol/ibuprofen, nebulizer p.r.n..  Return precautions given. [GM]      ED Course User Index  [GM] Laure Jacobs MD                           Clinical Impression:  Final diagnoses:  [R07.89] Chest discomfort  [U07.1] COVID-19 (Primary)          ED Disposition Condition    Discharge Stable          ED Prescriptions    None       Follow-up Information       Follow up With Specialties Details Why Contact Info    Katherine Scott MD Internal Medicine Schedule an appointment as soon as possible for a visit   [address]  [address]  [facility] [address]  [phone]               Laure Jacobs MD  07/15/24 2489       Laure Jacobs MD  07/15/24 7914

## 2024-07-15 NOTE — DISCHARGE INSTRUCTIONS
Your chest x-ray does not show signs of pneumonia.  You are COVID positive.  Your out of the window for treatment with an antiviral.  I recommend continue supportive therapy with Tylenol/ibuprofen.  Continue nebulizer as needed for chest tightness/cough/wheezing.  Stay hydrated.  Follow-up with PCP if symptoms do not improve or return to emergency department.

## 2024-07-15 NOTE — Clinical Note
"Frances "Juanita Cardoza was seen and treated in our emergency department on 7/15/2024.     COVID-19 is present in our communities across the state. There is limited testing for COVID at this time, so not all patients can be tested. In this situation, your employee meets the following criteria:    Frances Cardoza has met the criteria for COVID-19 testing and has a POSITIVE result. She can return to work once they are asymptomatic for 24 hours without the use of fever reducing medications AND at least five days from the first positive result. A mask is recommended for 5 days post quarantine.     If you have any questions or concerns, or if I can be of further assistance, please do not hesitate to contact me.    Sincerely,             Laure Jacobs MD"

## 2024-07-15 NOTE — ED NOTES
I-STAT Chem-8+ Results:   Value Reference Range   Sodium 136 136-145 mmol/L   Potassium  4.1 3.5-5.1 mmol/L   Chloride 103  mmol/L   Ionized Calcium 1.15 1.06-1.42 mmol/L   CO2 (measured) 22 23-29 mmol/L   Glucose 128  mg/dL   BUN 9 6-30 mg/dL   Creatinine 0.9 0.5-1.4 mg/dL   Hematocrit 44 36-54%

## 2024-07-19 DIAGNOSIS — F33.8 SEASONAL AFFECTIVE DISORDER: ICD-10-CM

## 2024-07-19 RX ORDER — DULOXETIN HYDROCHLORIDE 20 MG/1
CAPSULE, DELAYED RELEASE ORAL
Qty: 60 CAPSULE | Refills: 1 | Status: SHIPPED | OUTPATIENT
Start: 2024-07-19

## 2024-07-19 NOTE — TELEPHONE ENCOUNTER
Care Due:                  Date            Visit Type   Department     Provider  --------------------------------------------------------------------------------                                MYCHART                              FOLLOWUP/OF  [facility]  Last Visit: 11-      [facility] VISIT   CARE           Katherine Scott                              EP -                              PRIMARY      [facility]  Next Visit: 08-      CARE ([facility])   CARE           Katherine Scott                                                            Last  Test          Frequency    Reason                     Performed    Due Date  --------------------------------------------------------------------------------    Lipid Panel.  12 months..  rosuvastatin.............  07- 07-    [employer] Embedded Care Due Messages. Reference number: [id number].   7/19/2024 2:49:09 PM CDT

## 2024-08-06 ENCOUNTER — PATIENT MESSAGE (OUTPATIENT)
Dept: PRIMARY CARE CLINIC | Facility: CLINIC | Age: 64
End: 2024-08-06
Payer: COMMERCIAL

## 2024-08-06 ENCOUNTER — OFFICE VISIT (OUTPATIENT)
Dept: CARDIOLOGY | Facility: CLINIC | Age: 64
End: 2024-08-06
Payer: COMMERCIAL

## 2024-08-06 VITALS
HEIGHT: 65 IN | HEART RATE: 80 BPM | DIASTOLIC BLOOD PRESSURE: 85 MMHG | SYSTOLIC BLOOD PRESSURE: 140 MMHG | WEIGHT: 293 LBS | BODY MASS INDEX: 48.82 KG/M2

## 2024-08-06 DIAGNOSIS — I45.10 RBBB: ICD-10-CM

## 2024-08-06 DIAGNOSIS — I10 HYPERTENSION, BENIGN: ICD-10-CM

## 2024-08-06 DIAGNOSIS — I77.9 DISORDER OF ARTERIES AND ARTERIOLES, UNSPECIFIED: ICD-10-CM

## 2024-08-06 DIAGNOSIS — I65.29 CAROTID ATHEROSCLEROSIS, UNSPECIFIED LATERALITY: ICD-10-CM

## 2024-08-06 DIAGNOSIS — I25.10 CORONARY ARTERY DISEASE INVOLVING NATIVE CORONARY ARTERY OF NATIVE HEART WITHOUT ANGINA PECTORIS: Primary | ICD-10-CM

## 2024-08-06 DIAGNOSIS — E78.2 MIXED HYPERLIPIDEMIA: ICD-10-CM

## 2024-08-06 DIAGNOSIS — E66.01 MORBID (SEVERE) OBESITY DUE TO EXCESS CALORIES: ICD-10-CM

## 2024-08-06 PROCEDURE — 3077F SYST BP >= 140 MM HG: CPT | Mod: CPTII,S$GLB,, | Performed by: INTERNAL MEDICINE

## 2024-08-06 PROCEDURE — 3079F DIAST BP 80-89 MM HG: CPT | Mod: CPTII,S$GLB,, | Performed by: INTERNAL MEDICINE

## 2024-08-06 PROCEDURE — 1159F MED LIST DOCD IN RCRD: CPT | Mod: CPTII,S$GLB,, | Performed by: INTERNAL MEDICINE

## 2024-08-06 PROCEDURE — 99204 OFFICE O/P NEW MOD 45 MIN: CPT | Mod: S$GLB,,, | Performed by: INTERNAL MEDICINE

## 2024-08-06 PROCEDURE — 3008F BODY MASS INDEX DOCD: CPT | Mod: CPTII,S$GLB,, | Performed by: INTERNAL MEDICINE

## 2024-08-06 PROCEDURE — 99999 PR PBB SHADOW E&M-EST. PATIENT-LVL IV: CPT | Mod: PBBFAC,,, | Performed by: INTERNAL MEDICINE

## 2024-08-06 RX ORDER — EZETIMIBE 10 MG/1
10 TABLET ORAL DAILY
Qty: 90 TABLET | Refills: 3 | Status: SHIPPED | OUTPATIENT
Start: 2024-08-06 | End: 2025-08-06

## 2024-08-06 RX ORDER — ASPIRIN 81 MG/1
81 TABLET ORAL DAILY
Start: 2024-08-06 | End: 2025-08-06

## 2024-08-06 RX ORDER — HYDROCHLOROTHIAZIDE 12.5 MG/1
12.5 TABLET ORAL DAILY PRN
Qty: 30 TABLET | Refills: 1 | Status: SHIPPED | OUTPATIENT
Start: 2024-08-06 | End: 2024-08-06

## 2024-08-06 RX ORDER — HYDROCHLOROTHIAZIDE 12.5 MG/1
12.5 TABLET ORAL DAILY
Qty: 90 TABLET | Refills: 4 | Status: SHIPPED | OUTPATIENT
Start: 2024-08-06

## 2024-08-21 ENCOUNTER — HOSPITAL ENCOUNTER (OUTPATIENT)
Dept: RADIOLOGY | Facility: HOSPITAL | Age: 64
Discharge: HOME | End: 2024-08-21
Attending: NURSE PRACTITIONER
Payer: COMMERCIAL

## 2024-08-21 ENCOUNTER — OFFICE VISIT (OUTPATIENT)
Dept: PRIMARY CARE CLINIC | Facility: CLINIC | Age: 64
End: 2024-08-21
Payer: COMMERCIAL

## 2024-08-21 ENCOUNTER — PATIENT MESSAGE (OUTPATIENT)
Dept: PRIMARY CARE CLINIC | Facility: CLINIC | Age: 64
End: 2024-08-21

## 2024-08-21 VITALS
HEIGHT: 65 IN | OXYGEN SATURATION: 98 % | RESPIRATION RATE: 16 BRPM | WEIGHT: 293 LBS | DIASTOLIC BLOOD PRESSURE: 82 MMHG | HEART RATE: 82 BPM | SYSTOLIC BLOOD PRESSURE: 122 MMHG | BODY MASS INDEX: 48.82 KG/M2

## 2024-08-21 DIAGNOSIS — R06.02 SOB (SHORTNESS OF BREATH): ICD-10-CM

## 2024-08-21 DIAGNOSIS — K74.60 HEPATIC CIRRHOSIS, UNSPECIFIED HEPATIC CIRRHOSIS TYPE, UNSPECIFIED WHETHER ASCITES PRESENT: ICD-10-CM

## 2024-08-21 DIAGNOSIS — F33.8 SEASONAL AFFECTIVE DISORDER: ICD-10-CM

## 2024-08-21 DIAGNOSIS — R05.9 COUGH, UNSPECIFIED TYPE: ICD-10-CM

## 2024-08-21 DIAGNOSIS — I10 HYPERTENSION, BENIGN: ICD-10-CM

## 2024-08-21 DIAGNOSIS — J40 BRONCHITIS: Primary | ICD-10-CM

## 2024-08-21 DIAGNOSIS — R05.8 PRODUCTIVE COUGH: ICD-10-CM

## 2024-08-21 DIAGNOSIS — J45.30 MILD PERSISTENT ASTHMA WITHOUT COMPLICATION: ICD-10-CM

## 2024-08-21 DIAGNOSIS — J40 BRONCHITIS: ICD-10-CM

## 2024-08-21 PROCEDURE — 99999 PR PBB SHADOW E&M-EST. PATIENT-LVL IV: CPT | Mod: PBBFAC,,, | Performed by: NURSE PRACTITIONER

## 2024-08-21 PROCEDURE — 99214 OFFICE O/P EST MOD 30 MIN: CPT | Mod: S$GLB,,, | Performed by: NURSE PRACTITIONER

## 2024-08-21 PROCEDURE — 71046 X-RAY EXAM CHEST 2 VIEWS: CPT | Mod: 26,,, | Performed by: RADIOLOGY

## 2024-08-21 PROCEDURE — 3079F DIAST BP 80-89 MM HG: CPT | Mod: CPTII,S$GLB,, | Performed by: NURSE PRACTITIONER

## 2024-08-21 PROCEDURE — 1160F RVW MEDS BY RX/DR IN RCRD: CPT | Mod: CPTII,S$GLB,, | Performed by: NURSE PRACTITIONER

## 2024-08-21 PROCEDURE — 1159F MED LIST DOCD IN RCRD: CPT | Mod: CPTII,S$GLB,, | Performed by: NURSE PRACTITIONER

## 2024-08-21 PROCEDURE — 3074F SYST BP LT 130 MM HG: CPT | Mod: CPTII,S$GLB,, | Performed by: NURSE PRACTITIONER

## 2024-08-21 PROCEDURE — 3008F BODY MASS INDEX DOCD: CPT | Mod: CPTII,S$GLB,, | Performed by: NURSE PRACTITIONER

## 2024-08-21 PROCEDURE — 71046 X-RAY EXAM CHEST 2 VIEWS: CPT | Mod: TC

## 2024-08-21 RX ORDER — BENZONATATE 200 MG/1
200 CAPSULE ORAL 3 TIMES DAILY PRN
Qty: 30 CAPSULE | Refills: 0 | Status: SHIPPED | OUTPATIENT
Start: 2024-08-21 | End: 2024-08-31

## 2024-08-21 RX ORDER — METHYLPREDNISOLONE 4 MG/1
TABLET ORAL
Qty: 21 EACH | Refills: 0 | Status: SHIPPED | OUTPATIENT
Start: 2024-08-21 | End: 2024-09-11

## 2024-08-21 RX ORDER — AZITHROMYCIN 250 MG/1
TABLET, FILM COATED ORAL
Qty: 6 TABLET | Refills: 0 | Status: SHIPPED | OUTPATIENT
Start: 2024-08-21 | End: 2024-08-26

## 2024-08-22 ENCOUNTER — TELEPHONE (OUTPATIENT)
Dept: PRIMARY CARE CLINIC | Facility: CLINIC | Age: 64
End: 2024-08-22
Payer: COMMERCIAL

## 2024-08-22 NOTE — TELEPHONE ENCOUNTER
----- Message from Yanique Abad NP sent at 8/22/2024  9:16 AM CDT -----  Contact: Portal  Can you call and see if patient has received her medication  ----- Message -----  From: Caleb Mclaughlin  Sent: 8/21/2024   2:10 PM CDT  To: Yanique Abad NP    [email or web address]     Patient is calling for Medical Advice regarding: Patient expecting medication to be sent from today's visit, but the pharmacy did not have any orders    How long has patient had these symptoms:    Pharmacy name and phone#:   [id number] - [place], [place] - [address] AT [facility] [address]  [facility] [address]  Phone: [phone] Fax: [phone]          Patient wants a call back or thru [facility]: Portal    Comments:    Please advise patient replies from provider may take up to 48 hours.

## 2024-08-29 ENCOUNTER — TELEPHONE (OUTPATIENT)
Dept: PRIMARY CARE CLINIC | Facility: CLINIC | Age: 64
End: 2024-08-29
Payer: COMMERCIAL

## 2024-08-29 ENCOUNTER — TELEPHONE (OUTPATIENT)
Dept: PHARMACY | Facility: CLINIC | Age: 64
End: 2024-08-29
Payer: COMMERCIAL

## 2024-08-29 NOTE — TELEPHONE ENCOUNTER
Called x 2    LMOVM to convert visit from in person to virtual this afternoon.  Has to leave due to family emergency

## 2024-08-29 NOTE — TELEPHONE ENCOUNTER
[facility]/Population Health Chart Review & Patient Outreach Details For Medication Adherence Project    Reason for Outreach Encounter: 3rd Party payor non-compliance report ([employer], BCBS, C, etc)  2.  Patient Outreach Method: Reviewed Patient Chart  3.   Medication in question: rosuvastatin 40 mg    LAST FILLED: 6/3/24 for 90 day supply  Hyperlipidemia Medications               ezetimibe (ZETIA) 10 mg tablet Take 1 tablet (10 mg total) by mouth once daily.    rosuvastatin (CRESTOR) 40 MG Tab Take 1 tablet (40 mg total) by mouth once daily.               4.  Reviewed and or Updates Made To: Patient Chart  5. Outreach Outcomes and/or actions taken: Patient filled medication and is on track to be adherent

## 2024-09-10 ENCOUNTER — OFFICE VISIT (OUTPATIENT)
Dept: PRIMARY CARE CLINIC | Facility: CLINIC | Age: 64
End: 2024-09-10
Payer: COMMERCIAL

## 2024-09-10 ENCOUNTER — HOSPITAL ENCOUNTER (OUTPATIENT)
Dept: CARDIOLOGY | Facility: HOSPITAL | Age: 64
Discharge: HOME | End: 2024-09-10
Attending: INTERNAL MEDICINE
Payer: COMMERCIAL

## 2024-09-10 VITALS
SYSTOLIC BLOOD PRESSURE: 130 MMHG | OXYGEN SATURATION: 99 % | WEIGHT: 293 LBS | HEIGHT: 65 IN | DIASTOLIC BLOOD PRESSURE: 82 MMHG | BODY MASS INDEX: 48.82 KG/M2 | HEART RATE: 76 BPM

## 2024-09-10 DIAGNOSIS — R05.3 CHRONIC COUGH: Primary | ICD-10-CM

## 2024-09-10 DIAGNOSIS — R06.02 SOB (SHORTNESS OF BREATH): ICD-10-CM

## 2024-09-10 DIAGNOSIS — I65.29 CAROTID ATHEROSCLEROSIS, UNSPECIFIED LATERALITY: ICD-10-CM

## 2024-09-10 DIAGNOSIS — J40 BRONCHITIS: ICD-10-CM

## 2024-09-10 DIAGNOSIS — J45.30 MILD PERSISTENT ASTHMA WITHOUT COMPLICATION: ICD-10-CM

## 2024-09-10 DIAGNOSIS — R05.8 PRODUCTIVE COUGH: ICD-10-CM

## 2024-09-10 DIAGNOSIS — E03.9 HYPOTHYROIDISM, UNSPECIFIED TYPE: ICD-10-CM

## 2024-09-10 LAB
LEFT ARM DIASTOLIC BLOOD PRESSURE: 70 MMHG
LEFT ARM SYSTOLIC BLOOD PRESSURE: 132 MMHG
LEFT CBA DIAS: 30 CM/S
LEFT CBA SYS: 80 CM/S
LEFT CCA DIST DIAS: 24 CM/S
LEFT CCA DIST SYS: 80 CM/S
LEFT CCA MID DIAS: 25 CM/S
LEFT CCA MID SYS: 80 CM/S
LEFT CCA PROX DIAS: 23 CM/S
LEFT CCA PROX SYS: 80 CM/S
LEFT ECA DIAS: 18 CM/S
LEFT ECA SYS: 84 CM/S
LEFT ICA DIST DIAS: 34 CM/S
LEFT ICA DIST SYS: 87 CM/S
LEFT ICA MID DIAS: 28 CM/S
LEFT ICA MID SYS: 77 CM/S
LEFT ICA PROX DIAS: 35 CM/S
LEFT ICA PROX SYS: 88 CM/S
LEFT VERTEBRAL DIAS: 13 CM/S
LEFT VERTEBRAL SYS: 46 CM/S
OHS CV CAROTID RIGHT ICA EDV HIGHEST: 39
OHS CV CAROTID ULTRASOUND LEFT ICA/CCA RATIO: 1.1
OHS CV CAROTID ULTRASOUND RIGHT ICA/CCA RATIO: 1.7
OHS CV PV CAROTID LEFT HIGHEST CCA: 80
OHS CV PV CAROTID LEFT HIGHEST ICA: 88
OHS CV PV CAROTID RIGHT HIGHEST CCA: 76
OHS CV PV CAROTID RIGHT HIGHEST ICA: 95
OHS CV US CAROTID LEFT HIGHEST EDV: 35
RIGHT ARM DIASTOLIC BLOOD PRESSURE: 70 MMHG
RIGHT ARM SYSTOLIC BLOOD PRESSURE: 132 MMHG
RIGHT CBA DIAS: 19 CM/S
RIGHT CBA SYS: 57 CM/S
RIGHT CCA DIST DIAS: 17 CM/S
RIGHT CCA DIST SYS: 56 CM/S
RIGHT CCA MID DIAS: 22 CM/S
RIGHT CCA MID SYS: 76 CM/S
RIGHT CCA PROX DIAS: 14 CM/S
RIGHT CCA PROX SYS: 65 CM/S
RIGHT ECA DIAS: 12 CM/S
RIGHT ECA SYS: 79 CM/S
RIGHT ICA DIST DIAS: 39 CM/S
RIGHT ICA DIST SYS: 95 CM/S
RIGHT ICA MID DIAS: 35 CM/S
RIGHT ICA MID SYS: 86 CM/S
RIGHT ICA PROX DIAS: 19 CM/S
RIGHT ICA PROX SYS: 56 CM/S
RIGHT VERTEBRAL DIAS: 20 CM/S
RIGHT VERTEBRAL SYS: 55 CM/S

## 2024-09-10 PROCEDURE — 3008F BODY MASS INDEX DOCD: CPT | Mod: CPTII,S$GLB,, | Performed by: INTERNAL MEDICINE

## 2024-09-10 PROCEDURE — 99999 PR PBB SHADOW E&M-EST. PATIENT-LVL IV: CPT | Mod: PBBFAC,,, | Performed by: INTERNAL MEDICINE

## 2024-09-10 PROCEDURE — 1159F MED LIST DOCD IN RCRD: CPT | Mod: CPTII,S$GLB,, | Performed by: INTERNAL MEDICINE

## 2024-09-10 PROCEDURE — 99214 OFFICE O/P EST MOD 30 MIN: CPT | Mod: S$GLB,,, | Performed by: INTERNAL MEDICINE

## 2024-09-10 PROCEDURE — 93880 EXTRACRANIAL BILAT STUDY: CPT

## 2024-09-10 PROCEDURE — 3079F DIAST BP 80-89 MM HG: CPT | Mod: CPTII,S$GLB,, | Performed by: INTERNAL MEDICINE

## 2024-09-10 PROCEDURE — 93880 EXTRACRANIAL BILAT STUDY: CPT | Mod: 26,,, | Performed by: INTERNAL MEDICINE

## 2024-09-10 PROCEDURE — 3075F SYST BP GE 130 - 139MM HG: CPT | Mod: CPTII,S$GLB,, | Performed by: INTERNAL MEDICINE

## 2024-09-10 RX ORDER — BENZONATATE 200 MG/1
200 CAPSULE ORAL 3 TIMES DAILY PRN
Qty: 30 CAPSULE | Refills: 0 | Status: SHIPPED | OUTPATIENT
Start: 2024-09-10 | End: 2024-09-20

## 2024-09-10 NOTE — PROGRESS NOTES
[facility] Note    Chief Complaint      Chief Complaint   Patient presents with    Annual Exam     History of Present Illness      Frances Cardoza is a 64 y.o. female who presents today for chief complaint chronic cough     HPI     Chronic cough - she was seen 2 weeks ago ago and had negative cxr other than hiatal hernia, she is on BID PPI.She has not had PFTs, never smoker, she is wheezing at night. She has geraldo she uses q6h and before bed, she is not on antihistamine or flonase     Active Problem List with Overview Notes    Diagnosis Date Noted    Hepatic cirrhosis, unspecified hepatic cirrhosis type, unspecified whether ascites present 08/21/2024    RBBB 08/06/2024    Morbid (severe) obesity due to excess calories 02/26/2023    Disorder of arteries and arterioles, unspecified 02/26/2023    Seasonal affective disorder 11/10/2022    Family history of colon cancer 09/14/2022     Mother       Family history of BRCA gene mutation 09/14/2022    Family history of breast cancer 09/14/2022     M GGM, M Aunt, 2 sisters both BRCA pos       Coronary artery disease involving native coronary artery of native heart without angina pectoris 09/14/2022    Carotid atherosclerosis 09/14/2022    Hypertension, benign 09/14/2022    Mixed hyperlipidemia 09/14/2022    Asthma, mild persistent 09/14/2022    Post-surgical hypothyroidism 09/14/2022    Fibromyalgia 09/14/2022    Vitamin D deficiency 09/14/2022       Health Maintenance   Topic Date Due    TETANUS VACCINE  Never done    Shingles Vaccine (1 of 2) Never done    Mammogram  04/25/2024    Aspirin/Antiplatelet Therapy  09/10/2025    Lipid Panel  09/10/2029    Colorectal Cancer Screening  04/28/2033    Hepatitis C Screening  Completed       Past Medical History:   Diagnosis Date    Arthritis     Asthma     Coronary artery disease     Genetic testing 12/2022    negative, [employer] 84-gene Multi-Cancer +RNA panel    GERD (gastroesophageal reflux disease)      High cholesterol     Hypertension     Migraine headache     Plantar fasciitis     Post-surgical hypothyroidism 1997    Seasonal affective disorder 11/10/2022       Past Surgical History:   Procedure Laterality Date    ADENOIDECTOMY  1977    HERNIA REPAIR  1997    TONSILLECTOMY  1977    TOTAL THYROIDECTOMY Bilateral 1997    due to rapid growth of a benign nodule    uterine ablation  2010    for menorrhagia       family history includes BRCA 1/2 in her maternal aunt, sister, and sister; Breast cancer (age of onset: 25) in her maternal aunt; Breast cancer (age of onset: 56) in her sister; Breast cancer (age of onset: 58) in her sister; Breast cancer (age of onset: 80) in an other family member; Cancer (age of onset: 1) in an other family member; Cancer (age of onset: 50) in her maternal uncle; Cancer (age of onset: 88) in her paternal grandmother; Colon cancer in her mother; Heart attack in her paternal uncle; Heart attacks under age 50 in her paternal grandfather; Heart attacks under age 50 (age of onset: 30) in her brother; Hypertension in her father; Stroke in her paternal uncle; Stroke (age of onset: 71) in her father.    Social History     Tobacco Use    Smoking status: Never    Smokeless tobacco: Never   Substance Use Topics    Alcohol use: Yes     Comment: 1x day    Drug use: No       Review of Systems   Constitutional:  Negative for chills, fever, malaise/fatigue and weight loss.   Respiratory:  Positive for cough and wheezing. Negative for sputum production and shortness of breath.    Cardiovascular:  Negative for chest pain, palpitations, orthopnea and leg swelling.   Gastrointestinal:  Negative for constipation, diarrhea, nausea and vomiting.   Genitourinary:  Negative for dysuria, frequency, hematuria and urgency.        Outpatient Encounter Medications as of 9/10/2024   Medication Sig Dispense Refill    albuterol (VENTOLIN HFA) 90 mcg/actuation inhaler Inhale 2 puffs into the lungs every 6 (six) hours as  needed for Wheezing. Rescue 18 g 6    aspirin (ECOTRIN) 81 MG EC tablet Take 1 tablet (81 mg total) by mouth once daily.      DULoxetine (CYMBALTA) 20 MG capsule TAKE 2 CAPSULES(40 MG) BY MOUTH DAILY 60 capsule 1    ergocalciferol (ERGOCALCIFEROL) 50,000 unit Cap Take 1 capsule (50,000 Units total) by mouth every 7 days. 24 capsule 1    hydroCHLOROthiazide (HYDRODIURIL) 12.5 MG Tab Take 1 tablet (12.5 mg total) by mouth once daily. 90 tablet 4    ibuprofen (ADVIL,MOTRIN) 600 MG tablet Take 1 tablet (600 mg total) by mouth every 8 (eight) hours as needed for Pain. 30 tablet 0    levothyroxine (SYNTHROID) 50 MCG tablet Take 1 tablet (50 mcg total) by mouth before breakfast. 90 tablet 1    nebivoloL (BYSTOLIC) 10 MG Tab Take 10 mg by mouth once daily.      pantoprazole (PROTONIX) 40 MG tablet Take 1 tablet (40 mg total) by mouth 2 (two) times daily. 180 tablet 1    rosuvastatin (CRESTOR) 40 MG Tab Take 1 tablet (40 mg total) by mouth once daily. 90 tablet 1    [] benzonatate (TESSALON) 200 MG capsule Take 1 capsule (200 mg total) by mouth 3 (three) times daily as needed for Cough. 30 capsule 0    [DISCONTINUED] benzonatate (TESSALON) 200 MG capsule Take 1 capsule (200 mg total) by mouth 3 (three) times daily as needed for Cough. 30 capsule 0    [DISCONTINUED] ezetimibe (ZETIA) 10 mg tablet Take 1 tablet (10 mg total) by mouth once daily. (Patient not taking: Reported on 9/10/2024) 90 tablet 3    [DISCONTINUED] methylPREDNISolone (MEDROL DOSEPACK) 4 mg tablet use as directed 21 each 0     No facility-administered encounter medications on file as of 9/10/2024.        Review of patient's allergies indicates:   Allergen Reactions    Coly-mycin m [colistimethate sodium]     Lisinopril      Hives      Latex, natural rubber Rash    Plavix [clopidogrel] Hives and Rash    Zetia [ezetimibe] Rash     Rash           Physical Exam      Vital Signs  Pulse: 76  SpO2: 99 %  BP: 130/82  BP Location: Right arm  Patient Position:  "Sitting  Height and Weight  Height: 5' 5" (165.1 cm)  Weight: (!) 142.8 kg (314 lb 13.1 oz)  BSA (Calculated - sq m): 2.56 sq meters  BMI (Calculated): 52.4  Weight in (lb) to have BMI = 25: 149.9]    Physical Exam  Constitutional:       General: She is not in acute distress.     Appearance: She is well-developed. She is not diaphoretic.   HENT:      Head: Normocephalic and atraumatic.      Right Ear: External ear normal.      Left Ear: External ear normal.      Nose: Nose normal.   Eyes:      General:         Right eye: No discharge.         Left eye: No discharge.      Conjunctiva/sclera: Conjunctivae normal.   Cardiovascular:      Rate and Rhythm: Normal rate and regular rhythm.      Heart sounds: Normal heart sounds.   Pulmonary:      Effort: Pulmonary effort is normal. No respiratory distress.      Breath sounds: Normal breath sounds.   Musculoskeletal:         General: Normal range of motion.      Cervical back: Normal range of motion.   Skin:     Coloration: Skin is not pale.      Findings: No rash.   Neurological:      Mental Status: She is alert and oriented to person, place, and time.   Psychiatric:         Behavior: Behavior normal.         Thought Content: Thought content normal.          Laboratory:  CBC:  Recent Labs   Lab Result Units 07/15/24  0921 07/15/24  0923   WBC K/uL 5.37  --    RBC M/uL 5.67*  --    Hemoglobin g/dL 13.9  --    POC Hematocrit %PCV  --  44   Hematocrit % 45.4  --    Platelets K/uL 202  --    MCV fL 80*  --    MCH pg 24.5*  --    MCHC g/dL 30.6*  --      CMP:  No results for input(s): "GLU", "CALCIUM", "ALBUMIN", "PROT", "NA", "K", "CO2", "CL", "BUN", "ALKPHOS", "ALT", "AST", "BILITOT" in the last 2160 hours.    Invalid input(s): "CREATININ"  URINALYSIS:  No results for input(s): "COLORU", "CLARITYU", "SPECGRAV", "PHUR", "PROTEINUA", "GLUCOSEU", "BILIRUBINCON", "BLOODU", "WBCU", "RBCU", "BACTERIA", "MUCUS", "NITRITE", "LEUKOCYTESUR", "UROBILINOGEN", "HYALINECASTS" in the last " "2160 hours.   LIPIDS:  Recent Labs   Lab Result Units 09/10/24  0802 09/10/24  1040   TSH uIU/mL  --  2.018   HDL mg/dL 63  --    Cholesterol mg/dL 160  --    Triglycerides mg/dL 169*  --    LDL Cholesterol mg/dL 63.2  --    HDL/Cholesterol Ratio % 39.4  --    Non-HDL Cholesterol mg/dL 97  --    Total Cholesterol/HDL Ratio  2.5  --      TSH:  Recent Labs   Lab Result Units 09/10/24  1040   TSH uIU/mL 2.018     A1C:  No results for input(s): "HGBA1C" in the last 2160 hours.    Radiology:      Assessment/Plan     Frances Cardoza is a 64 y.o.female with:    1. Chronic cough  -     Ambulatory referral/consult to ENT; Future; Expected date: 09/17/2024  -     Pulmonary function test; Future  -     benzonatate (TESSALON) 200 MG capsule; Take 1 capsule (200 mg total) by mouth 3 (three) times daily as needed for Cough.  Dispense: 30 capsule; Refill: 0    2. Bronchitis  -     benzonatate (TESSALON) 200 MG capsule; Take 1 capsule (200 mg total) by mouth 3 (three) times daily as needed for Cough.  Dispense: 30 capsule; Refill: 0    3. SOB (shortness of breath)  -     benzonatate (TESSALON) 200 MG capsule; Take 1 capsule (200 mg total) by mouth 3 (three) times daily as needed for Cough.  Dispense: 30 capsule; Refill: 0    4. Productive cough  -     benzonatate (TESSALON) 200 MG capsule; Take 1 capsule (200 mg total) by mouth 3 (three) times daily as needed for Cough.  Dispense: 30 capsule; Refill: 0    5. Mild persistent asthma without complication  -     benzonatate (TESSALON) 200 MG capsule; Take 1 capsule (200 mg total) by mouth 3 (three) times daily as needed for Cough.  Dispense: 30 capsule; Refill: 0    6. Hypothyroidism, unspecified type  -     TSH; Future; Expected date: 09/10/2024         Use of the [employer] Patient Portal discussed and encouraged during today's visit  -Continue current medications and maintain follow up with specialists.  Return to clinic in .  Future Appointments   Date Time Provider " [facility]   10/29/2024 11:30 AM Jeansonne, Julie R., MD [facility]       Katherine Scott MD  9/30/2024 10:03 AM    Primary Care Internal Medicine

## 2024-09-10 NOTE — PATIENT INSTRUCTIONS
OTC antihistamine (Zyrtec, Claritin, Allegra, or Xyxal) and a steroid nasal spray such as flonase.

## 2024-09-16 ENCOUNTER — HOSPITAL ENCOUNTER (OUTPATIENT)
Dept: PULMONOLOGY | Facility: CLINIC | Age: 64
Discharge: HOME | End: 2024-09-16
Payer: COMMERCIAL

## 2024-09-16 DIAGNOSIS — R05.3 CHRONIC COUGH: ICD-10-CM

## 2024-09-16 DIAGNOSIS — J45.30 ASTHMA, MILD PERSISTENT: Primary | ICD-10-CM

## 2024-09-16 LAB
DLCO SINGLE BREATH LLN: 16.57
DLCO SINGLE BREATH PRE REF: 72.4 %
DLCO SINGLE BREATH REF: 22.31
DLCOC SBVA LLN: 3.04
DLCOC SBVA REF: 4.52
DLCOC SINGLE BREATH LLN: 16.57
DLCOC SINGLE BREATH REF: 22.31
DLCOCSBVAULN: 5.99
DLCOCSINGLEBREATHULN: 28.04
DLCOSINGLEBREATHULN: 28.04
DLCOSINGLEBREATHZSCORE: -1.77
DLCOVA LLN: 3.04
DLCOVA PRE REF: 110.7 %
DLCOVA PRE: 5 ML/(MIN*MMHG*L) (ref 3.04–5.99)
DLCOVA REF: 4.52
DLCOVAULN: 5.99
ERVN2 LLN: -16449.25
ERVN2 PRE REF: 20.4 %
ERVN2 PRE: 0.15 L (ref -16449.25–16450.75)
ERVN2 REF: 0.75
ERVN2ULN: ABNORMAL
FEF 25 75 LLN: 1.41
FEF 25 75 PRE REF: 46.4 %
FEF 25 75 REF: 2.81
FET100 CHG: 1.9 %
FEV05 LLN: 0.95
FEV05 REF: 1.8
FEV1 CHG: 7.3 %
FEV1 FVC LLN: 67
FEV1 FVC PRE REF: 96.7 %
FEV1 FVC REF: 79
FEV1 LLN: 1.8
FEV1 PRE REF: 65.1 %
FEV1 REF: 2.41
FEV1 VOL CHG: 0.11
FEV1FVCZSCORE: -0.38
FEV1ZSCORE: -2.25
FRCN2 LLN: 1.88
FRCN2 PRE REF: 61.5 %
FRCN2 REF: 2.7
FRCN2ULN: 3.53
FVC CHG: 4.4 %
FVC LLN: 2.3
FVC PRE REF: 67 %
FVC REF: 3.07
FVC VOL CHG: 0.09
FVCZSCORE: -2.19
ICN2REF: 2.03
IVC PRE: 2.05 L (ref 2.3–3.87)
IVC SINGLE BREATH LLN: 2.3
IVC SINGLE BREATH PRE REF: 67 %
IVC SINGLE BREATH REF: 3.07
IVCSINGLEBREATHULN: 3.87
LLN IC N2: -16447.97
PEF LLN: 4.41
PEF PRE REF: 58.8 %
PEF REF: 6.12
PHYSICIAN COMMENT: ABNORMAL
POST FEF 25 75: 1.6 L/S (ref 1.41–4.21)
POST FET 100: 6.68 SEC
POST FEV1 FVC: 78.45 % (ref 66.58–89.56)
POST FEV1: 1.68 L (ref 1.8–2.99)
POST FEV5: 1.36 L (ref 0.95–2.66)
POST FVC: 2.14 L (ref 2.3–3.87)
POST PEF: 3.56 L/S (ref 4.41–7.83)
PRE DLCO: 16.15 ML/(MIN*MMHG) (ref 16.57–28.04)
PRE FEF 25 75: 1.3 L/S (ref 1.41–4.21)
PRE FET 100: 6.56 SEC
PRE FEV05 REF: 69.9 %
PRE FEV1 FVC: 76.33 % (ref 66.58–89.56)
PRE FEV1: 1.57 L (ref 1.8–2.99)
PRE FEV5: 1.26 L (ref 0.95–2.66)
PRE FRC N2: 1.66 L (ref 1.88–3.53)
PRE FVC: 2.05 L (ref 2.3–3.87)
PRE IC N2: 2.01 L (ref -16447.97–16452.03)
PRE PEF: 3.6 L/S (ref 4.41–7.83)
PRE REF IC N2: 98.9 %
RVN2 LLN: 1.37
RVN2 PRE REF: 77.3 %
RVN2 PRE: 1.51 L (ref 1.37–2.53)
RVN2 REF: 1.95
RVN2TLCN2 LLN: 30.79
RVN2TLCN2 PRE REF: 101.8 %
RVN2TLCN2 PRE: 41.1 % (ref 30.79–49.97)
RVN2TLCN2 REF: 40.38
RVN2TLCN2ULN: 49.97
RVN2ULN: 2.53
TLCN2 LLN: 3.95
TLCN2 PRE REF: 74.3 %
TLCN2 PRE: 3.67 L (ref 3.95–5.93)
TLCN2 REF: 4.94
TLCN2ULN: 5.93
TLCN2ZSCORE: -2.12
ULN IC N2: ABNORMAL
VA PRE: 3.23 L (ref 4.79–4.79)
VA SINGLE BREATH LLN: 4.79
VA SINGLE BREATH PRE REF: 67.5 %
VA SINGLE BREATH REF: 4.79
VASINGLEBREATHULN: 4.79
VCMAXN2 LLN: 2.3
VCMAXN2 PRE REF: 70.4 %
VCMAXN2 PRE: 2.16 L (ref 2.3–3.87)
VCMAXN2 REF: 3.07
VCMAXN2ULN: 3.87

## 2024-09-16 PROCEDURE — 94729 DIFFUSING CAPACITY: CPT | Mod: S$GLB,,, | Performed by: INTERNAL MEDICINE

## 2024-09-16 PROCEDURE — 94727 GAS DIL/WSHOT DETER LNG VOL: CPT | Mod: S$GLB,,, | Performed by: INTERNAL MEDICINE

## 2024-09-16 PROCEDURE — 94060 EVALUATION OF WHEEZING: CPT | Mod: S$GLB,,, | Performed by: INTERNAL MEDICINE

## 2024-09-19 ENCOUNTER — PATIENT MESSAGE (OUTPATIENT)
Dept: PRIMARY CARE CLINIC | Facility: CLINIC | Age: 64
End: 2024-09-19
Payer: COMMERCIAL

## 2024-09-25 ENCOUNTER — PATIENT MESSAGE (OUTPATIENT)
Dept: PRIMARY CARE CLINIC | Facility: CLINIC | Age: 64
End: 2024-09-25
Payer: COMMERCIAL

## 2024-09-30 ENCOUNTER — PATIENT MESSAGE (OUTPATIENT)
Dept: PRIMARY CARE CLINIC | Facility: CLINIC | Age: 64
End: 2024-09-30
Payer: COMMERCIAL

## 2024-09-30 DIAGNOSIS — J45.30 MILD PERSISTENT ASTHMA WITHOUT COMPLICATION: ICD-10-CM

## 2024-09-30 DIAGNOSIS — R05.3 CHRONIC COUGH: Primary | ICD-10-CM

## 2024-10-16 ENCOUNTER — PATIENT OUTREACH (OUTPATIENT)
Dept: ADMINISTRATIVE | Facility: HOSPITAL | Age: 64
End: 2024-10-16
Payer: COMMERCIAL

## 2024-10-16 NOTE — PROGRESS NOTES
Health Maintenance Due   Topic Date Due    TETANUS VACCINE  Never done    Shingles Vaccine (1 of 2) Never done    RSV Vaccine (Age 60+ and Pregnant patients) (1 - Risk 60-74 years 1-dose series) Never done    Hemoglobin A1c (Prediabetes)  02/22/2024    Mammogram  04/25/2024    Influenza Vaccine (1) 09/01/2024    COVID-19 Vaccine (1 - 2024-25 season) Never done     Chart review completed. HM Updated. Triggered Links. Immunizations reviewed and updated. Care Everywhere Updated. Care Team Updated.  Commercial Gap List chart review.

## 2024-10-18 ENCOUNTER — TELEPHONE (OUTPATIENT)
Dept: PHARMACY | Facility: CLINIC | Age: 64
End: 2024-10-18
Payer: COMMERCIAL

## 2024-10-18 NOTE — TELEPHONE ENCOUNTER
[facility]/Population Health Chart Review & Patient Outreach Details For Medication Adherence Project    Reason for Outreach Encounter: 3rd Party payor non-compliance report ([employer], BCBS, [employer], etc)  2.  Patient Outreach Method: Reviewed patient chart   3.   Medication in question:         Hyperlipidemia Medications               rosuvastatin (CRESTOR) 40 MG Tab Take 1 tablet (40 mg total) by mouth once daily.                  rosuvastatin  last filled  9/10/24 for 90 day supply      4.  Reviewed and or Updates Made To: Patient Chart  5. Outreach Outcomes and/or actions taken: Patient filled medication and is on track to be adherent and Medication discontinued  Additional Notes:   DM rx dc

## 2024-10-20 DIAGNOSIS — E55.9 VITAMIN D DEFICIENCY: ICD-10-CM

## 2024-10-21 RX ORDER — ERGOCALCIFEROL 1.25 MG/1
50000 CAPSULE ORAL
Qty: 24 CAPSULE | Refills: 1 | Status: SHIPPED | OUTPATIENT
Start: 2024-10-21

## 2024-10-29 ENCOUNTER — OFFICE VISIT (OUTPATIENT)
Dept: OBSTETRICS AND GYNECOLOGY | Facility: CLINIC | Age: 64
End: 2024-10-29
Payer: COMMERCIAL

## 2024-10-29 VITALS
SYSTOLIC BLOOD PRESSURE: 150 MMHG | HEIGHT: 65 IN | DIASTOLIC BLOOD PRESSURE: 85 MMHG | BODY MASS INDEX: 48.82 KG/M2 | WEIGHT: 293 LBS

## 2024-10-29 DIAGNOSIS — Z01.419 ENCOUNTER FOR ANNUAL ROUTINE GYNECOLOGICAL EXAMINATION: Primary | ICD-10-CM

## 2024-10-29 DIAGNOSIS — E66.813 CLASS 3 SEVERE OBESITY WITH BODY MASS INDEX (BMI) OF 50.0 TO 59.9 IN ADULT, UNSPECIFIED OBESITY TYPE, UNSPECIFIED WHETHER SERIOUS COMORBIDITY PRESENT: ICD-10-CM

## 2024-10-29 DIAGNOSIS — E66.01 CLASS 3 SEVERE OBESITY WITH BODY MASS INDEX (BMI) OF 50.0 TO 59.9 IN ADULT, UNSPECIFIED OBESITY TYPE, UNSPECIFIED WHETHER SERIOUS COMORBIDITY PRESENT: ICD-10-CM

## 2024-10-29 DIAGNOSIS — R23.8 SKIN IRRITATION: ICD-10-CM

## 2024-10-29 PROCEDURE — 3079F DIAST BP 80-89 MM HG: CPT | Mod: CPTII,S$GLB,, | Performed by: OBSTETRICS & GYNECOLOGY

## 2024-10-29 PROCEDURE — 3077F SYST BP >= 140 MM HG: CPT | Mod: CPTII,S$GLB,, | Performed by: OBSTETRICS & GYNECOLOGY

## 2024-10-29 PROCEDURE — 3008F BODY MASS INDEX DOCD: CPT | Mod: CPTII,S$GLB,, | Performed by: OBSTETRICS & GYNECOLOGY

## 2024-10-29 PROCEDURE — 99999 PR PBB SHADOW E&M-EST. PATIENT-LVL IV: CPT | Mod: PBBFAC,,, | Performed by: OBSTETRICS & GYNECOLOGY

## 2024-10-29 PROCEDURE — 1160F RVW MEDS BY RX/DR IN RCRD: CPT | Mod: CPTII,S$GLB,, | Performed by: OBSTETRICS & GYNECOLOGY

## 2024-10-29 PROCEDURE — 1159F MED LIST DOCD IN RCRD: CPT | Mod: CPTII,S$GLB,, | Performed by: OBSTETRICS & GYNECOLOGY

## 2024-10-29 PROCEDURE — 99386 PREV VISIT NEW AGE 40-64: CPT | Mod: S$GLB,,, | Performed by: OBSTETRICS & GYNECOLOGY

## 2024-10-29 RX ORDER — NYSTATIN 100000 [USP'U]/G
POWDER TOPICAL
Qty: 60 G | Refills: 1 | Status: SHIPPED | OUTPATIENT
Start: 2024-10-29 | End: 2025-10-29

## 2024-11-11 ENCOUNTER — PATIENT MESSAGE (OUTPATIENT)
Dept: OBSTETRICS AND GYNECOLOGY | Facility: CLINIC | Age: 64
End: 2024-11-11
Payer: COMMERCIAL

## 2024-11-12 DIAGNOSIS — R23.8 SKIN IRRITATION: Primary | ICD-10-CM

## 2024-11-12 RX ORDER — NYSTATIN 100000 U/G
CREAM TOPICAL 2 TIMES DAILY
Qty: 30 G | Refills: 0 | Status: SHIPPED | OUTPATIENT
Start: 2024-11-12 | End: 2024-11-19

## 2024-12-17 ENCOUNTER — OFFICE VISIT (OUTPATIENT)
Dept: PRIMARY CARE CLINIC | Facility: CLINIC | Age: 64
End: 2024-12-17
Payer: COMMERCIAL

## 2024-12-17 VITALS
HEIGHT: 65 IN | TEMPERATURE: 98 F | OXYGEN SATURATION: 95 % | BODY MASS INDEX: 48.82 KG/M2 | SYSTOLIC BLOOD PRESSURE: 132 MMHG | DIASTOLIC BLOOD PRESSURE: 82 MMHG | HEART RATE: 79 BPM | RESPIRATION RATE: 16 BRPM | WEIGHT: 293 LBS

## 2024-12-17 DIAGNOSIS — I10 HYPERTENSION, BENIGN: ICD-10-CM

## 2024-12-17 DIAGNOSIS — J40 BRONCHITIS: ICD-10-CM

## 2024-12-17 DIAGNOSIS — J01.00 ACUTE NON-RECURRENT MAXILLARY SINUSITIS: ICD-10-CM

## 2024-12-17 DIAGNOSIS — J45.30 MILD PERSISTENT ASTHMA WITHOUT COMPLICATION: ICD-10-CM

## 2024-12-17 PROCEDURE — 99214 OFFICE O/P EST MOD 30 MIN: CPT | Mod: S$GLB,,, | Performed by: NURSE PRACTITIONER

## 2024-12-17 PROCEDURE — 99999 PR PBB SHADOW E&M-EST. PATIENT-LVL IV: CPT | Mod: PBBFAC,,, | Performed by: NURSE PRACTITIONER

## 2024-12-17 PROCEDURE — 1160F RVW MEDS BY RX/DR IN RCRD: CPT | Mod: CPTII,S$GLB,, | Performed by: NURSE PRACTITIONER

## 2024-12-17 PROCEDURE — 3079F DIAST BP 80-89 MM HG: CPT | Mod: CPTII,S$GLB,, | Performed by: NURSE PRACTITIONER

## 2024-12-17 PROCEDURE — 1159F MED LIST DOCD IN RCRD: CPT | Mod: CPTII,S$GLB,, | Performed by: NURSE PRACTITIONER

## 2024-12-17 PROCEDURE — 3075F SYST BP GE 130 - 139MM HG: CPT | Mod: CPTII,S$GLB,, | Performed by: NURSE PRACTITIONER

## 2024-12-17 PROCEDURE — 3008F BODY MASS INDEX DOCD: CPT | Mod: CPTII,S$GLB,, | Performed by: NURSE PRACTITIONER

## 2024-12-17 RX ORDER — HYDROCHLOROTHIAZIDE 12.5 MG/1
12.5 TABLET ORAL DAILY
Qty: 90 TABLET | Refills: 4 | Status: SHIPPED | OUTPATIENT
Start: 2024-12-17

## 2024-12-17 RX ORDER — CEFDINIR 300 MG/1
300 CAPSULE ORAL 2 TIMES DAILY
Qty: 20 CAPSULE | Refills: 0 | Status: SHIPPED | OUTPATIENT
Start: 2024-12-17 | End: 2024-12-27

## 2024-12-17 RX ORDER — ALBUTEROL SULFATE 0.83 MG/ML
2.5 SOLUTION RESPIRATORY (INHALATION) EVERY 6 HOURS PRN
Qty: 360 ML | Refills: 0 | Status: SHIPPED | OUTPATIENT
Start: 2024-12-17 | End: 2025-01-16

## 2024-12-17 RX ORDER — CODEINE PHOSPHATE AND GUAIFENESIN 10; 100 MG/5ML; MG/5ML
5 SOLUTION ORAL 3 TIMES DAILY PRN
Qty: 140 ML | Refills: 0 | Status: SHIPPED | OUTPATIENT
Start: 2024-12-17 | End: 2024-12-27

## 2024-12-17 RX ORDER — METHYLPREDNISOLONE 4 MG/1
TABLET ORAL
Qty: 21 EACH | Refills: 0 | Status: SHIPPED | OUTPATIENT
Start: 2024-12-17 | End: 2025-01-07

## 2024-12-17 RX ORDER — NEBIVOLOL 10 MG/1
10 TABLET ORAL DAILY
Qty: 90 TABLET | Refills: 3 | Status: SHIPPED | OUTPATIENT
Start: 2024-12-17

## 2024-12-17 RX ORDER — ALBUTEROL SULFATE 90 UG/1
2 INHALANT RESPIRATORY (INHALATION) EVERY 6 HOURS PRN
Qty: 18 G | Refills: 6 | Status: SHIPPED | OUTPATIENT
Start: 2024-12-17 | End: 2025-12-17

## 2024-12-17 NOTE — PROGRESS NOTES
[facility] Note    Chief Complaint      Chief Complaint   Patient presents with    Sore Throat    Cough    Otalgia    Headache     Started Sunday     History of Present Illness      Frances Cardoza is a 64 y.o. female patient of Dr. Scott who presents today for sore throat, headache, earache, cough.        History of Present Illness    CHIEF COMPLAINT:  Frances presents today with upper respiratory symptoms.    HISTORY OF PRESENT ILLNESS:  She reports ear pain radiating down the throat and fluid in both ears. She is coughing up brown phlegm and blood, with difficulty breathing at night. She denies fever.    RESPIRATORY HISTORY:  She has a history of chronic cough from July to November, which was severe enough to cause near incontinence. The cough persisted for approximately six months, causing fatigue and discomfort. She also has a history of COVID infection.    CURRENT MEDICATIONS:  She uses an inhaler and nebulizer, and takes blood pressure medication.      ROS:  General: -fever  ENT: +ear pain  Respiratory: +cough, +difficulty breathing         Health Maintenance   Topic Date Due    TETANUS VACCINE  Never done    Shingles Vaccine (1 of 2) Never done    RSV Vaccine (Age 60+ and Pregnant patients) (1 - Risk 60-74 years 1-dose series) Never done    Hemoglobin A1c (Prediabetes)  02/22/2024    COVID-19 Vaccine (1 - 2024-25 season) Never done    Mammogram  10/28/2025    Aspirin/Antiplatelet Therapy  12/17/2025    Lipid Panel  09/10/2029    Cervical Cancer Screening  10/29/2029    Colorectal Cancer Screening  04/28/2033    Hepatitis C Screening  Completed    Influenza Vaccine  Completed    HIV Screening  Completed    Pneumococcal Vaccines (Age 0-64)  Completed       Past Medical History:   Diagnosis Date    Arthritis     Asthma     Coronary artery disease     Genetic testing 12/2022    negative, [employer] 84-gene Multi-Cancer +RNA panel    GERD (gastroesophageal reflux disease)     High  cholesterol     Hypertension     Migraine headache     Plantar fasciitis     Post-surgical hypothyroidism 1997    Seasonal affective disorder 11/10/2022       Past Surgical History:   Procedure Laterality Date    ADENOIDECTOMY  1977    HERNIA REPAIR  1997    TONSILLECTOMY  1977    TOTAL THYROIDECTOMY Bilateral 1997    due to rapid growth of a benign nodule    uterine ablation  2010    for menorrhagia       family history includes BRCA 1/2 in her maternal aunt, sister, and sister; Breast cancer (age of onset: 25) in her maternal aunt; Breast cancer (age of onset: 56) in her sister; Breast cancer (age of onset: 58) in her sister; Breast cancer (age of onset: 80) in an other family member; Cancer (age of onset: 1) in an other family member; Cancer (age of onset: 50) in her maternal uncle; Cancer (age of onset: 88) in her paternal grandmother; Colon cancer in her mother; Heart attack in her paternal uncle; Heart attacks under age 50 in her paternal grandfather; Heart attacks under age 50 (age of onset: 30) in her brother; Hypertension in her father; Stroke in her paternal uncle; Stroke (age of onset: 71) in her father.    Social History     Tobacco Use    Smoking status: Never    Smokeless tobacco: Never   Substance Use Topics    Alcohol use: Yes     Comment: 1x day    Drug use: No       Outpatient Encounter Medications as of 12/17/2024   Medication Sig Dispense Refill    aspirin (ECOTRIN) 81 MG EC tablet Take 1 tablet (81 mg total) by mouth once daily.      DULoxetine (CYMBALTA) 20 MG capsule TAKE 2 CAPSULES(40 MG) BY MOUTH DAILY 180 capsule 0    ergocalciferol (ERGOCALCIFEROL) 50,000 unit Cap TAKE 1 CAPSULE BY MOUTH EVERY 7 DAYS 24 capsule 1    ibuprofen (ADVIL,MOTRIN) 600 MG tablet Take 1 tablet (600 mg total) by mouth every 8 (eight) hours as needed for Pain. 30 tablet 0    levothyroxine (SYNTHROID) 50 MCG tablet Take 1 tablet (50 mcg total) by mouth before breakfast. 90 tablet 1    pantoprazole (PROTONIX) 40 MG  tablet Take 1 tablet (40 mg total) by mouth 2 (two) times daily. 180 tablet 1    rosuvastatin (CRESTOR) 40 MG Tab Take 1 tablet (40 mg total) by mouth once daily. 90 tablet 1    [DISCONTINUED] hydroCHLOROthiazide (HYDRODIURIL) 12.5 MG Tab Take 1 tablet (12.5 mg total) by mouth once daily. 90 tablet 4    [DISCONTINUED] nebivoloL (BYSTOLIC) 10 MG Tab Take 10 mg by mouth once daily.      albuterol (PROVENTIL) 2.5 mg /3 mL (0.083 %) nebulizer solution Take 3 mLs (2.5 mg total) by nebulization every 6 (six) hours as needed for Wheezing. Rescue 360 mL 0    albuterol (VENTOLIN HFA) 90 mcg/actuation inhaler Inhale 2 puffs into the lungs every 6 (six) hours as needed for Wheezing. Rescue 18 g 6    cefdinir (OMNICEF) 300 MG capsule Take 1 capsule (300 mg total) by mouth 2 (two) times daily. for 10 days 20 capsule 0    guaiFENesin-codeine 100-10 mg/5 ml (TUSSI-ORGANIDIN NR)  mg/5 mL syrup Take 5 mLs by mouth 3 (three) times daily as needed for Cough. 140 mL 0    hydroCHLOROthiazide (HYDRODIURIL) 12.5 MG Tab Take 1 tablet (12.5 mg total) by mouth once daily. 90 tablet 4    methylPREDNISolone (MEDROL DOSEPACK) 4 mg tablet use as directed 21 each 0    nebivoloL (BYSTOLIC) 10 MG Tab Take 1 tablet (10 mg total) by mouth once daily. 90 tablet 3    nystatin (MYCOSTATIN) cream Apply topically 2 (two) times daily. for 7 days 30 g 0    [DISCONTINUED] albuterol (VENTOLIN HFA) 90 mcg/actuation inhaler Inhale 2 puffs into the lungs every 6 (six) hours as needed for Wheezing. Rescue 18 g 6     No facility-administered encounter medications on file as of 12/17/2024.        Review of patient's allergies indicates:   Allergen Reactions    Coly-mycin m [colistimethate sodium]     Lisinopril      Hives      Latex, natural rubber Rash    Plavix [clopidogrel] Hives and Rash    Zetia [ezetimibe] Rash     Rash       Physical Exam      Vital Signs  Temp: 97.8 °F (36.6 °C)  Pulse: 79  Resp: 16  SpO2: 95 %  BP: 132/82  BP Location: Right  "arm  Patient Position: Sitting  Height and Weight  Height: 5' 5" (165.1 cm)  Weight: (!) 146.7 kg (323 lb 6.6 oz)  BSA (Calculated - sq m): 2.59 sq meters  BMI (Calculated): 53.8  Weight in (lb) to have BMI = 25: 149.9    Physical Exam  Vitals and nursing note reviewed.   Constitutional:       General: She is not in acute distress.     Appearance: Normal appearance. She is well-developed. She is not ill-appearing.   HENT:      Head: Normocephalic and atraumatic.      Right Ear: External ear normal.      Left Ear: External ear normal.      Ears:      Comments: Redness to silvano ear canals w/ clear effusion noted behind TM     Nose: Nose normal.      Mouth/Throat:      Mouth: Mucous membranes are moist.      Pharynx: Posterior oropharyngeal erythema present.   Eyes:      Conjunctiva/sclera: Conjunctivae normal.      Pupils: Pupils are equal, round, and reactive to light.   Neck:      Thyroid: No thyromegaly.      Vascular: No JVD.      Trachea: No tracheal deviation.   Cardiovascular:      Rate and Rhythm: Normal rate and regular rhythm.      Heart sounds: Normal heart sounds. No murmur heard.  Pulmonary:      Effort: Pulmonary effort is normal.      Breath sounds: Normal breath sounds.   Chest:      Chest wall: No tenderness.   Abdominal:      General: Bowel sounds are normal.      Palpations: Abdomen is soft.      Tenderness: There is no abdominal tenderness. There is no guarding.   Musculoskeletal:         General: Normal range of motion.      Cervical back: Normal range of motion and neck supple.   Lymphadenopathy:      Cervical: Cervical adenopathy present.   Skin:     General: Skin is warm and dry.   Neurological:      General: No focal deficit present.      Mental Status: She is alert and oriented to person, place, and time.   Psychiatric:         Mood and Affect: Mood normal.         Behavior: Behavior normal.         Thought Content: Thought content normal.         Judgment: Judgment normal.      " "    Laboratory:  CBC:  Lab Results   Component Value Date    WBC 5.37 07/15/2024    RBC 5.67 (H) 07/15/2024    HGB 13.9 07/15/2024    HCT 44 07/15/2024     07/15/2024    MCV 80 (L) 07/15/2024    MCH 24.5 (L) 07/15/2024    MCHC 30.6 (L) 07/15/2024    MCHC 31.6 (L) 01/03/2024    MCHC 32.1 12/18/2023     CMP:  Lab Results   Component Value Date    GLU 94 01/03/2024    CALCIUM 10.2 01/03/2024    ALBUMIN 3.5 01/03/2024    PROT 7.4 01/03/2024     01/03/2024    K 4.2 01/03/2024    CO2 27 01/03/2024     01/03/2024    BUN 14 01/03/2024    ALKPHOS 91 01/03/2024    ALT 25 01/03/2024    AST 21 01/03/2024    BILITOT 0.5 01/03/2024    BILITOT 0.7 02/22/2023    BILITOT 0.7 03/22/2022     URINALYSIS:  No results found for: "COLORU", "CLARITYU", "SPECGRAV", "PHUR", "PROTEINUA", "GLUCOSEU", "BILIRUBINCON", "BLOODU", "WBCU", "RBCU", "BACTERIA", "MUCUS", "NITRITE", "LEUKOCYTESUR", "UROBILINOGEN", "HYALINECASTS"   LIPIDS:  Lab Results   Component Value Date    TSH 2.018 09/10/2024    TSH 2.0 02/18/2006    HDL 63 09/10/2024    HDL 66 07/05/2022    HDL 67.0 02/18/2006    CHOL 160 09/10/2024    CHOL 180 07/05/2022    CHOL 205 (H) 02/18/2006    TRIG 169 (H) 09/10/2024    TRIG 151 (H) 07/05/2022    TRIG 71 02/18/2006    LDLCALC 63.2 09/10/2024    LDLCALC 90 07/05/2022    LDLCALC 123.8 02/18/2006    CHOLHDL 39.4 09/10/2024    CHOLHDL 2.73 07/05/2022    CHOLHDL 32.7 02/18/2006    NONHDLCHOL 97 09/10/2024    TOTALCHOLEST 2.5 09/10/2024    TOTALCHOLEST 3.1 02/18/2006     TSH:  Lab Results   Component Value Date    TSH 2.018 09/10/2024    TSH 2.0 02/18/2006     A1C:  Lab Results   Component Value Date    HGBA1C 6.0 (H) 02/22/2023    HGBA1C 5.8 (H) 07/05/2022    HGBA1C 5.6 03/22/2022    HGBA1C 5.7 07/24/2021         Assessment/Plan     Frances Newell Nani is a 64 y.o.female with:    Assessment & Plan    IMPRESSION:  - Evaluated patient presenting with upper respiratory symptoms and cough  - Noted fluid in both ears and " throat irritation  - Considered [place] exposure [place] as potential source of infection  - Assessed need for antibiotic and steroid treatment given severity of symptoms  - Opted for oral antibiotic (omnicef) over Augmentin to minimize GI side effects  - Decided on steroid injection if available, with oral steroids as backup option  - Recognized prolonged cough as common issue among patients, potentially related to COVID-19 immune system effects    ACUTE PHARYNGITIS AND RESPIRATORY SYMPTOMS:  - Started omnicef (cefdinir) for infection.  - Started steroid (shot preferred if available, otherwise oral).  - Started cough syrup.  - Refilled inhaler.  - Refilled nebulizer medication.  - Frances to stay hydrated.    HYPERTENSION:  - Refilled blood pressure medicine.    FOLLOW-UP:  - Follow up as scheduled for moved appointment.         Mild persistent asthma without complication  -     albuterol (VENTOLIN HFA) 90 mcg/actuation inhaler; Inhale 2 puffs into the lungs every 6 (six) hours as needed for Wheezing. Rescue  Dispense: 18 g; Refill: 6    Acute non-recurrent maxillary sinusitis  -     albuterol (VENTOLIN HFA) 90 mcg/actuation inhaler; Inhale 2 puffs into the lungs every 6 (six) hours as needed for Wheezing. Rescue  Dispense: 18 g; Refill: 6  -     cefdinir (OMNICEF) 300 MG capsule; Take 1 capsule (300 mg total) by mouth 2 (two) times daily. for 10 days  Dispense: 20 capsule; Refill: 0  -     methylPREDNISolone (MEDROL DOSEPACK) 4 mg tablet; use as directed  Dispense: 21 each; Refill: 0  -     guaiFENesin-codeine 100-10 mg/5 ml (TUSSI-ORGANIDIN NR)  mg/5 mL syrup; Take 5 mLs by mouth 3 (three) times daily as needed for Cough.  Dispense: 140 mL; Refill: 0    Bronchitis  -     albuterol (VENTOLIN HFA) 90 mcg/actuation inhaler; Inhale 2 puffs into the lungs every 6 (six) hours as needed for Wheezing. Rescue  Dispense: 18 g; Refill: 6  -     cefdinir (OMNICEF) 300 MG capsule; Take 1 capsule (300 mg total) by  mouth 2 (two) times daily. for 10 days  Dispense: 20 capsule; Refill: 0  -     methylPREDNISolone (MEDROL DOSEPACK) 4 mg tablet; use as directed  Dispense: 21 each; Refill: 0  -     guaiFENesin-codeine 100-10 mg/5 ml (TUSSI-ORGANIDIN NR)  mg/5 mL syrup; Take 5 mLs by mouth 3 (three) times daily as needed for Cough.  Dispense: 140 mL; Refill: 0    Hypertension, benign  Comments:  Persistent mild elevation, take hydrochlorothiazide which he is currently not taking regularly 3 times a week  Continue Bystolic   ACE inhibitor allergy, hives  Orders:  -     hydroCHLOROthiazide (HYDRODIURIL) 12.5 MG Tab; Take 1 tablet (12.5 mg total) by mouth once daily.  Dispense: 90 tablet; Refill: 4  -     nebivoloL (BYSTOLIC) 10 MG Tab; Take 1 tablet (10 mg total) by mouth once daily.  Dispense: 90 tablet; Refill: 3    Other orders  -     albuterol (PROVENTIL) 2.5 mg /3 mL (0.083 %) nebulizer solution; Take 3 mLs (2.5 mg total) by nebulization every 6 (six) hours as needed for Wheezing. Rescue  Dispense: 360 mL; Refill: 0          Health Maintenance Due   Topic Date Due    TETANUS VACCINE  Never done    Shingles Vaccine (1 of 2) Never done    RSV Vaccine (Age 60+ and Pregnant patients) (1 - Risk 60-74 years 1-dose series) Never done    Hemoglobin A1c (Prediabetes)  02/22/2024    COVID-19 Vaccine (1 - 2024-25 season) Never done        I spent 37 minutes on the day of this encounter for preparing for, evaluating, treating, and managing this patient.      -Continue current medications and maintain follow up with specialists.  Return to clinic as needed for any concerns   No follow-ups on file.     This note was generated with the assistance of ambient listening technology. Verbal consent was obtained by the patient and accompanying visitor(s) for the recording of patient appointment to facilitate this note. I attest to having reviewed and edited the generated note for accuracy, though some syntax or spelling errors may persist.  Please contact the author of this note for any clarification.        VIKAS Rendon  [facility]

## 2025-01-13 DIAGNOSIS — R23.8 SKIN IRRITATION: ICD-10-CM

## 2025-01-13 DIAGNOSIS — F33.8 SEASONAL AFFECTIVE DISORDER: ICD-10-CM

## 2025-01-14 RX ORDER — NYSTATIN 100000 U/G
CREAM TOPICAL
Qty: 30 G | Refills: 0 | Status: SHIPPED | OUTPATIENT
Start: 2025-01-14

## 2025-01-14 NOTE — TELEPHONE ENCOUNTER
Refill Routing Note   Medication(s) are not appropriate for processing by [facility] for the following reason(s):        Required labs outdated    [facility] action(s):  Defer     Requires labs : Yes           Appointments  past 12m or future 3m with PCP    Date Provider   Last Visit   9/10/2024 Katherine cSott MD   Next Visit   Visit date not found Katherine Scott MD   ED visits in past 90 days: 0        Note composed:11:23 PM 01/13/2025

## 2025-01-14 NOTE — TELEPHONE ENCOUNTER
Care Due:                  Date            Visit Type   Department     Provider  --------------------------------------------------------------------------------                                EP -                              PRIMARY      [facility]  Last Visit: 09-      CARE ([facility])   CARE           Katherine Scott  Next Visit: None Scheduled  None         None Found                                                            Last  Test          Frequency    Reason                     Performed    Due Date  --------------------------------------------------------------------------------    CMP.........  12 months..  ergocalciferol,            01- 12-                             rosuvastatin.............    [employer] Embedded Care Due Messages. Reference number: [id number].   1/13/2025 6:27:17 PM CST

## 2025-01-15 RX ORDER — DULOXETIN HYDROCHLORIDE 20 MG/1
40 CAPSULE, DELAYED RELEASE ORAL DAILY
Qty: 180 CAPSULE | Refills: 0 | Status: SHIPPED | OUTPATIENT
Start: 2025-01-15

## 2025-03-20 ENCOUNTER — PATIENT MESSAGE (OUTPATIENT)
Dept: PRIMARY CARE CLINIC | Facility: CLINIC | Age: 65
End: 2025-03-20
Payer: COMMERCIAL

## 2025-03-20 DIAGNOSIS — E78.2 MIXED HYPERLIPIDEMIA: ICD-10-CM

## 2025-03-20 DIAGNOSIS — I25.10 CORONARY ARTERY DISEASE INVOLVING NATIVE CORONARY ARTERY OF NATIVE HEART WITHOUT ANGINA PECTORIS: ICD-10-CM

## 2025-03-20 DIAGNOSIS — R23.8 SKIN IRRITATION: ICD-10-CM

## 2025-03-20 DIAGNOSIS — I65.29 CAROTID ATHEROSCLEROSIS, UNSPECIFIED LATERALITY: ICD-10-CM

## 2025-03-20 DIAGNOSIS — K21.9 GASTROESOPHAGEAL REFLUX DISEASE, UNSPECIFIED WHETHER ESOPHAGITIS PRESENT: ICD-10-CM

## 2025-03-20 RX ORDER — NYSTATIN 100000 U/G
CREAM TOPICAL 2 TIMES DAILY
Qty: 30 G | Refills: 1 | Status: SHIPPED | OUTPATIENT
Start: 2025-03-20 | End: 2025-03-27

## 2025-03-21 RX ORDER — ROSUVASTATIN CALCIUM 40 MG/1
40 TABLET, COATED ORAL NIGHTLY
Qty: 90 TABLET | Refills: 0 | Status: SHIPPED | OUTPATIENT
Start: 2025-03-21

## 2025-03-21 RX ORDER — PANTOPRAZOLE SODIUM 40 MG/1
40 TABLET, DELAYED RELEASE ORAL 2 TIMES DAILY
Qty: 180 TABLET | Refills: 1 | Status: SHIPPED | OUTPATIENT
Start: 2025-03-21

## 2025-03-21 NOTE — TELEPHONE ENCOUNTER
Refill request   pantoprazole 40 mg  levothyroxine 50 mg    Last office visit     12/17/24    Medication pended

## 2025-06-05 ENCOUNTER — TELEPHONE (OUTPATIENT)
Dept: PRIMARY CARE CLINIC | Facility: CLINIC | Age: 65
End: 2025-06-05

## 2025-06-05 ENCOUNTER — OFFICE VISIT (OUTPATIENT)
Dept: PRIMARY CARE CLINIC | Facility: CLINIC | Age: 65
End: 2025-06-05
Payer: COMMERCIAL

## 2025-06-05 ENCOUNTER — LAB VISIT (OUTPATIENT)
Dept: LAB | Facility: HOSPITAL | Age: 65
End: 2025-06-05
Attending: NURSE PRACTITIONER
Payer: COMMERCIAL

## 2025-06-05 VITALS
HEIGHT: 65 IN | DIASTOLIC BLOOD PRESSURE: 68 MMHG | BODY MASS INDEX: 48.82 KG/M2 | RESPIRATION RATE: 16 BRPM | OXYGEN SATURATION: 99 % | WEIGHT: 293 LBS | HEART RATE: 86 BPM | SYSTOLIC BLOOD PRESSURE: 122 MMHG

## 2025-06-05 DIAGNOSIS — M79.7 FIBROMYALGIA: ICD-10-CM

## 2025-06-05 DIAGNOSIS — Z80.3 FAMILY HISTORY OF BREAST CANCER: ICD-10-CM

## 2025-06-05 DIAGNOSIS — M25.531 BILATERAL WRIST PAIN: ICD-10-CM

## 2025-06-05 DIAGNOSIS — E66.01 MORBID (SEVERE) OBESITY DUE TO EXCESS CALORIES: ICD-10-CM

## 2025-06-05 DIAGNOSIS — Z00.00 ENCOUNTER FOR MEDICAL EXAMINATION TO ESTABLISH CARE: ICD-10-CM

## 2025-06-05 DIAGNOSIS — I25.10 CORONARY ARTERY DISEASE INVOLVING NATIVE CORONARY ARTERY OF NATIVE HEART WITHOUT ANGINA PECTORIS: ICD-10-CM

## 2025-06-05 DIAGNOSIS — I10 HYPERTENSION, BENIGN: ICD-10-CM

## 2025-06-05 DIAGNOSIS — J45.30 MILD PERSISTENT ASTHMA WITHOUT COMPLICATION: ICD-10-CM

## 2025-06-05 DIAGNOSIS — K74.60 HEPATIC CIRRHOSIS, UNSPECIFIED HEPATIC CIRRHOSIS TYPE, UNSPECIFIED WHETHER ASCITES PRESENT: ICD-10-CM

## 2025-06-05 DIAGNOSIS — E55.9 VITAMIN D DEFICIENCY: ICD-10-CM

## 2025-06-05 DIAGNOSIS — E78.2 MIXED HYPERLIPIDEMIA: ICD-10-CM

## 2025-06-05 DIAGNOSIS — K21.9 GASTROESOPHAGEAL REFLUX DISEASE, UNSPECIFIED WHETHER ESOPHAGITIS PRESENT: ICD-10-CM

## 2025-06-05 DIAGNOSIS — Z00.00 ENCOUNTER FOR MEDICAL EXAMINATION TO ESTABLISH CARE: Primary | ICD-10-CM

## 2025-06-05 DIAGNOSIS — Z12.31 ENCOUNTER FOR SCREENING MAMMOGRAM FOR MALIGNANT NEOPLASM OF BREAST: ICD-10-CM

## 2025-06-05 DIAGNOSIS — J01.00 ACUTE NON-RECURRENT MAXILLARY SINUSITIS: ICD-10-CM

## 2025-06-05 DIAGNOSIS — I65.29 CAROTID ATHEROSCLEROSIS, UNSPECIFIED LATERALITY: ICD-10-CM

## 2025-06-05 DIAGNOSIS — F33.8 SEASONAL AFFECTIVE DISORDER: ICD-10-CM

## 2025-06-05 DIAGNOSIS — J40 BRONCHITIS: ICD-10-CM

## 2025-06-05 DIAGNOSIS — E89.0 POST-SURGICAL HYPOTHYROIDISM: ICD-10-CM

## 2025-06-05 DIAGNOSIS — M25.532 BILATERAL WRIST PAIN: ICD-10-CM

## 2025-06-05 LAB
25(OH)D3+25(OH)D2 SERPL-MCNC: 30 NG/ML (ref 30–96)
ABSOLUTE EOSINOPHIL (OHS): 0.14 K/UL
ABSOLUTE MONOCYTE (OHS): 0.49 K/UL (ref 0.3–1)
ABSOLUTE NEUTROPHIL COUNT (OHS): 4.19 K/UL (ref 1.8–7.7)
ALBUMIN SERPL BCP-MCNC: 3.8 G/DL (ref 3.5–5.2)
ALP SERPL-CCNC: 88 UNIT/L (ref 40–150)
ALT SERPL W/O P-5'-P-CCNC: 39 UNIT/L (ref 10–44)
ANION GAP (OHS): 10 MMOL/L (ref 8–16)
AST SERPL-CCNC: 34 UNIT/L (ref 11–45)
BASOPHILS # BLD AUTO: 0.07 K/UL
BASOPHILS NFR BLD AUTO: 0.9 %
BILIRUB SERPL-MCNC: 0.5 MG/DL (ref 0.1–1)
BUN SERPL-MCNC: 19 MG/DL (ref 8–23)
CALCIUM SERPL-MCNC: 10.3 MG/DL (ref 8.7–10.5)
CHLORIDE SERPL-SCNC: 103 MMOL/L (ref 95–110)
CHOLEST SERPL-MCNC: 153 MG/DL (ref 120–199)
CHOLEST/HDLC SERPL: 2.7 {RATIO} (ref 2–5)
CO2 SERPL-SCNC: 29 MMOL/L (ref 23–29)
CREAT SERPL-MCNC: 0.8 MG/DL (ref 0.5–1.4)
EAG (OHS): 143 MG/DL (ref 68–131)
ERYTHROCYTE [DISTWIDTH] IN BLOOD BY AUTOMATED COUNT: 15.7 % (ref 11.5–14.5)
GFR SERPLBLD CREATININE-BSD FMLA CKD-EPI: >60 ML/MIN/1.73/M2
GLUCOSE SERPL-MCNC: 117 MG/DL (ref 70–110)
HBA1C MFR BLD: 6.6 % (ref 4–5.6)
HCT VFR BLD AUTO: 43 % (ref 37–48.5)
HDLC SERPL-MCNC: 57 MG/DL (ref 40–75)
HDLC SERPL: 37.3 % (ref 20–50)
HGB BLD-MCNC: 13.1 GM/DL (ref 12–16)
IMM GRANULOCYTES # BLD AUTO: 0.02 K/UL (ref 0–0.04)
IMM GRANULOCYTES NFR BLD AUTO: 0.3 % (ref 0–0.5)
LDLC SERPL CALC-MCNC: 69.6 MG/DL (ref 63–159)
LYMPHOCYTES # BLD AUTO: 2.78 K/UL (ref 1–4.8)
MCH RBC QN AUTO: 24.1 PG (ref 27–31)
MCHC RBC AUTO-ENTMCNC: 30.5 G/DL (ref 32–36)
MCV RBC AUTO: 79 FL (ref 82–98)
NONHDLC SERPL-MCNC: 96 MG/DL
NUCLEATED RBC (/100WBC) (OHS): 0 /100 WBC
PLATELET # BLD AUTO: 294 K/UL (ref 150–450)
PMV BLD AUTO: 9.6 FL (ref 9.2–12.9)
POTASSIUM SERPL-SCNC: 4.3 MMOL/L (ref 3.5–5.1)
PROT SERPL-MCNC: 7.8 GM/DL (ref 6–8.4)
RBC # BLD AUTO: 5.43 M/UL (ref 4–5.4)
RELATIVE EOSINOPHIL (OHS): 1.8 %
RELATIVE LYMPHOCYTE (OHS): 36.2 % (ref 18–48)
RELATIVE MONOCYTE (OHS): 6.4 % (ref 4–15)
RELATIVE NEUTROPHIL (OHS): 54.4 % (ref 38–73)
SODIUM SERPL-SCNC: 142 MMOL/L (ref 136–145)
T4 FREE SERPL-MCNC: 1.04 NG/DL (ref 0.71–1.51)
TRIGL SERPL-MCNC: 132 MG/DL (ref 30–150)
TSH SERPL-ACNC: 2.22 UIU/ML (ref 0.4–4)
WBC # BLD AUTO: 7.69 K/UL (ref 3.9–12.7)

## 2025-06-05 PROCEDURE — 84439 ASSAY OF FREE THYROXINE: CPT

## 2025-06-05 PROCEDURE — 84443 ASSAY THYROID STIM HORMONE: CPT

## 2025-06-05 PROCEDURE — 84075 ASSAY ALKALINE PHOSPHATASE: CPT

## 2025-06-05 PROCEDURE — 82306 VITAMIN D 25 HYDROXY: CPT

## 2025-06-05 PROCEDURE — 36415 COLL VENOUS BLD VENIPUNCTURE: CPT

## 2025-06-05 PROCEDURE — 83036 HEMOGLOBIN GLYCOSYLATED A1C: CPT

## 2025-06-05 PROCEDURE — 82465 ASSAY BLD/SERUM CHOLESTEROL: CPT

## 2025-06-05 PROCEDURE — 99999 PR PBB SHADOW E&M-EST. PATIENT-LVL V: CPT | Mod: PBBFAC,,, | Performed by: NURSE PRACTITIONER

## 2025-06-05 PROCEDURE — 85025 COMPLETE CBC W/AUTO DIFF WBC: CPT

## 2025-06-05 RX ORDER — ALBUTEROL SULFATE 90 UG/1
2 INHALANT RESPIRATORY (INHALATION) EVERY 6 HOURS PRN
Qty: 18 G | Refills: 6 | Status: SHIPPED | OUTPATIENT
Start: 2025-06-05 | End: 2026-06-05

## 2025-06-05 RX ORDER — ROSUVASTATIN CALCIUM 40 MG/1
40 TABLET, COATED ORAL NIGHTLY
Qty: 90 TABLET | Refills: 0 | Status: SHIPPED | OUTPATIENT
Start: 2025-06-05

## 2025-06-05 RX ORDER — LEVOTHYROXINE SODIUM 50 UG/1
50 TABLET ORAL
Qty: 90 TABLET | Refills: 1 | Status: SHIPPED | OUTPATIENT
Start: 2025-06-05

## 2025-06-05 RX ORDER — ERGOCALCIFEROL 1.25 MG/1
50000 CAPSULE ORAL
Qty: 24 CAPSULE | Refills: 1 | Status: SHIPPED | OUTPATIENT
Start: 2025-06-05

## 2025-06-05 RX ORDER — DULOXETIN HYDROCHLORIDE 20 MG/1
40 CAPSULE, DELAYED RELEASE ORAL DAILY
Qty: 180 CAPSULE | Refills: 0 | Status: SHIPPED | OUTPATIENT
Start: 2025-06-05

## 2025-06-05 RX ORDER — PANTOPRAZOLE SODIUM 40 MG/1
40 TABLET, DELAYED RELEASE ORAL 2 TIMES DAILY
Qty: 180 TABLET | Refills: 1 | Status: SHIPPED | OUTPATIENT
Start: 2025-06-05

## 2025-06-05 RX ORDER — HYDROCHLOROTHIAZIDE 12.5 MG/1
12.5 TABLET ORAL DAILY
Qty: 90 TABLET | Refills: 4 | Status: SHIPPED | OUTPATIENT
Start: 2025-06-05

## 2025-06-05 RX ORDER — IBUPROFEN 600 MG/1
600 TABLET, FILM COATED ORAL EVERY 8 HOURS PRN
Qty: 30 TABLET | Refills: 0 | Status: SHIPPED | OUTPATIENT
Start: 2025-06-05

## 2025-06-05 RX ORDER — NEBIVOLOL 10 MG/1
10 TABLET ORAL DAILY
Qty: 90 TABLET | Refills: 3 | Status: SHIPPED | OUTPATIENT
Start: 2025-06-05

## 2025-06-09 ENCOUNTER — RESULTS FOLLOW-UP (OUTPATIENT)
Dept: PRIMARY CARE CLINIC | Facility: CLINIC | Age: 65
End: 2025-06-09
Payer: COMMERCIAL

## 2025-06-09 DIAGNOSIS — E78.2 MIXED HYPERLIPIDEMIA: ICD-10-CM

## 2025-06-09 DIAGNOSIS — I65.29 CAROTID ATHEROSCLEROSIS, UNSPECIFIED LATERALITY: ICD-10-CM

## 2025-06-09 DIAGNOSIS — E66.01 MORBID (SEVERE) OBESITY DUE TO EXCESS CALORIES: ICD-10-CM

## 2025-06-09 DIAGNOSIS — M79.7 FIBROMYALGIA: ICD-10-CM

## 2025-06-09 DIAGNOSIS — I10 HYPERTENSION, BENIGN: ICD-10-CM

## 2025-06-09 DIAGNOSIS — E11.65 TYPE 2 DIABETES MELLITUS WITH HYPERGLYCEMIA, WITHOUT LONG-TERM CURRENT USE OF INSULIN: Primary | ICD-10-CM

## 2025-06-09 DIAGNOSIS — J45.30 MILD PERSISTENT ASTHMA WITHOUT COMPLICATION: ICD-10-CM

## 2025-06-09 RX ORDER — METFORMIN HYDROCHLORIDE 500 MG/1
TABLET, EXTENDED RELEASE ORAL
Qty: 360 TABLET | Refills: 3 | Status: SHIPPED | OUTPATIENT
Start: 2025-06-09

## 2025-06-10 RX ORDER — TIRZEPATIDE 5 MG/0.2ML
5 SYRINGE (ML) SUBCUTANEOUS WEEKLY
Qty: 2 ML | Refills: 3 | Status: SHIPPED | OUTPATIENT
Start: 2025-06-10

## 2025-06-11 ENCOUNTER — TELEPHONE (OUTPATIENT)
Dept: PRIMARY CARE CLINIC | Facility: CLINIC | Age: 65
End: 2025-06-11
Payer: COMMERCIAL

## 2025-06-17 ENCOUNTER — PATIENT MESSAGE (OUTPATIENT)
Dept: PRIMARY CARE CLINIC | Facility: CLINIC | Age: 65
End: 2025-06-17
Payer: COMMERCIAL

## 2025-06-25 PROBLEM — E11.65 TYPE 2 DIABETES MELLITUS WITH HYPERGLYCEMIA: Status: ACTIVE | Noted: 2025-06-25

## 2025-06-30 ENCOUNTER — TELEPHONE (OUTPATIENT)
Dept: PRIMARY CARE CLINIC | Facility: CLINIC | Age: 65
End: 2025-06-30
Payer: COMMERCIAL

## 2025-07-15 ENCOUNTER — OFFICE VISIT (OUTPATIENT)
Dept: PRIMARY CARE CLINIC | Facility: CLINIC | Age: 65
End: 2025-07-15
Payer: COMMERCIAL

## 2025-07-15 ENCOUNTER — HOSPITAL ENCOUNTER (OUTPATIENT)
Dept: RADIOLOGY | Facility: HOSPITAL | Age: 65
Discharge: HOME | End: 2025-07-15
Attending: NURSE PRACTITIONER
Payer: COMMERCIAL

## 2025-07-15 VITALS
RESPIRATION RATE: 16 BRPM | OXYGEN SATURATION: 98 % | HEIGHT: 65 IN | WEIGHT: 293 LBS | HEART RATE: 83 BPM | BODY MASS INDEX: 48.82 KG/M2 | SYSTOLIC BLOOD PRESSURE: 124 MMHG | DIASTOLIC BLOOD PRESSURE: 78 MMHG

## 2025-07-15 DIAGNOSIS — B37.9 ANTIBIOTIC-INDUCED YEAST INFECTION: ICD-10-CM

## 2025-07-15 DIAGNOSIS — T36.95XA ANTIBIOTIC-INDUCED YEAST INFECTION: ICD-10-CM

## 2025-07-15 DIAGNOSIS — J40 BRONCHITIS: ICD-10-CM

## 2025-07-15 DIAGNOSIS — J01.10 ACUTE NON-RECURRENT FRONTAL SINUSITIS: Primary | ICD-10-CM

## 2025-07-15 DIAGNOSIS — R05.1 ACUTE COUGH: ICD-10-CM

## 2025-07-15 DIAGNOSIS — J01.10 ACUTE NON-RECURRENT FRONTAL SINUSITIS: ICD-10-CM

## 2025-07-15 PROCEDURE — 71046 X-RAY EXAM CHEST 2 VIEWS: CPT | Mod: TC

## 2025-07-15 PROCEDURE — 99999 PR PBB SHADOW E&M-EST. PATIENT-LVL V: CPT | Mod: PBBFAC,,, | Performed by: NURSE PRACTITIONER

## 2025-07-15 PROCEDURE — 1159F MED LIST DOCD IN RCRD: CPT | Mod: CPTII,S$GLB,, | Performed by: NURSE PRACTITIONER

## 2025-07-15 PROCEDURE — 3074F SYST BP LT 130 MM HG: CPT | Mod: CPTII,S$GLB,, | Performed by: NURSE PRACTITIONER

## 2025-07-15 PROCEDURE — 1160F RVW MEDS BY RX/DR IN RCRD: CPT | Mod: CPTII,S$GLB,, | Performed by: NURSE PRACTITIONER

## 2025-07-15 PROCEDURE — 3008F BODY MASS INDEX DOCD: CPT | Mod: CPTII,S$GLB,, | Performed by: NURSE PRACTITIONER

## 2025-07-15 PROCEDURE — 3078F DIAST BP <80 MM HG: CPT | Mod: CPTII,S$GLB,, | Performed by: NURSE PRACTITIONER

## 2025-07-15 PROCEDURE — 71046 X-RAY EXAM CHEST 2 VIEWS: CPT | Mod: 26,,, | Performed by: RADIOLOGY

## 2025-07-15 PROCEDURE — 99214 OFFICE O/P EST MOD 30 MIN: CPT | Mod: S$GLB,,, | Performed by: NURSE PRACTITIONER

## 2025-07-15 PROCEDURE — 3044F HG A1C LEVEL LT 7.0%: CPT | Mod: CPTII,S$GLB,, | Performed by: NURSE PRACTITIONER

## 2025-07-15 RX ORDER — AMOXICILLIN AND CLAVULANATE POTASSIUM 875; 125 MG/1; MG/1
1 TABLET, FILM COATED ORAL EVERY 12 HOURS
Qty: 14 TABLET | Refills: 0 | Status: SHIPPED | OUTPATIENT
Start: 2025-07-15 | End: 2025-07-22

## 2025-07-15 RX ORDER — METHYLPREDNISOLONE 4 MG/1
TABLET ORAL
Qty: 21 EACH | Refills: 0 | Status: SHIPPED | OUTPATIENT
Start: 2025-07-15 | End: 2025-08-05

## 2025-07-15 RX ORDER — FLUCONAZOLE 150 MG/1
150 TABLET ORAL DAILY
Qty: 1 TABLET | Refills: 0 | Status: SHIPPED | OUTPATIENT
Start: 2025-07-15 | End: 2025-07-16

## 2025-07-15 RX ORDER — CODEINE PHOSPHATE AND GUAIFENESIN 10; 100 MG/5ML; MG/5ML
5 SOLUTION ORAL 3 TIMES DAILY PRN
Qty: 140 ML | Refills: 0 | Status: SHIPPED | OUTPATIENT
Start: 2025-07-15 | End: 2025-07-25

## 2025-07-15 NOTE — PROGRESS NOTES
"[facility] Note    Chief Complaint      Chief Complaint   Patient presents with    Sore Throat    Otalgia    Chest Congestion     X 1 week     History of Present Illness      Frances Cardoza is a 65 y.o. female patient who presents today for  cough cold symptoms    History of Present Illness    CHIEF COMPLAINT:  Frances presents today with congestion and cough    RESPIRATORY SYMPTOMS:  She reports persistent cough with significant phlegm production, noting this is more extensive than her typical experience. She describes associated back pain and chest tightness. She also reports nasal congestion and sore throat that is red and painful, with water causing irritation when swallowing.    ENT SYMPTOMS:  She experiences intermittent ear popping and temporary hearing impairment.    WEIGHT MANAGEMENT:  She reports current weight of 310-314 lbs, decreased from 323 lbs. She recently completed Tirzepatide treatment with final dose administered last Wednesday.    MENTAL HEALTH:  She reports worsening anxiety symptoms and feeling more depressed, coinciding with seasonal time changes and decreased daylight. Her medication regimen has been previously adjusted to address these seasonal mental health challenges.    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    SKIN:  She reports an extremely itchy mosquito bite from a "white sock" mosquito acquired during recent travel to [place]. The bite symptoms are more intense than typical mosquito bites and are expected to persist for approximately three weeks.      ROS:  ENT: +nasal congestion, +sore throat, +difficulty hearing, +ear pressure  Respiratory: +cough, +chest tightness, +productive cough  Musculoskeletal: +back pain  Skin: +insect bites, +itching  Psychiatric: +anxiety, +depression         Health Maintenance   Topic Date Due    " TETANUS VACCINE  Never done    Shingles Vaccine (1 of 2) Never done    RSV Vaccine (Age 60+ and Pregnant patients) (1 - Risk 60-74 years 1-dose series) Never done    COVID-19 Vaccine (1 - 2024-25 season) 06/05/2026 (Originally 9/1/2024)    Influenza Vaccine (1) 09/01/2025    Mammogram  10/28/2025    Aspirin/Antiplatelet Therapy  07/15/2026    Cervical Cancer Screening  10/29/2029    Lipid Panel  06/05/2030    Colorectal Cancer Screening  04/28/2033    Hepatitis C Screening  Completed    HIV Screening  Completed    Pneumococcal Vaccines (Age 50+)  Completed       Past Medical History:   Diagnosis Date    Arthritis     Asthma     Coronary artery disease     Genetic testing 12/2022    negative, [facility] 84-gene Multi-Cancer +RNA panel    GERD (gastroesophageal reflux disease)     High cholesterol     Hypertension     Migraine headache     Plantar fasciitis     Post-surgical hypothyroidism 1997    Seasonal affective disorder 11/10/2022       Past Surgical History:   Procedure Laterality Date    ADENOIDECTOMY  1977    HERNIA REPAIR  1997    TONSILLECTOMY  1977    TOTAL THYROIDECTOMY Bilateral 1997    due to rapid growth of a benign nodule    uterine ablation  2010    for menorrhagia       family history includes BRCA 1/2 in her maternal aunt, sister, and sister; Breast cancer (age of onset: 25) in her maternal aunt; Breast cancer (age of onset: 56) in her sister; Breast cancer (age of onset: 58) in her sister; Breast cancer (age of onset: 80) in an other family member; Cancer (age of onset: 1) in an other family member; Cancer (age of onset: 50) in her maternal uncle; Cancer (age of onset: 88) in her paternal grandmother; Colon cancer in her mother; Heart attack in her paternal uncle; Heart attacks under age 50 in her paternal grandfather; Heart attacks under age 50 (age of onset: 30) in her brother; Hypertension in her father; Stroke in her paternal uncle; Stroke (age of onset: 71) in her father.    Social  "History[1]    Encounter Medications[2]     Review of patient's allergies indicates:   Allergen Reactions    Coly-mycin m [colistimethate sodium]     Lisinopril      Hives      Latex, natural rubber Rash    Plavix [clopidogrel] Hives and Rash    Zetia [ezetimibe] Rash     Rash       Physical Exam      Vital Signs  Pulse: 83  Resp: 16  SpO2: 98 %  BP: 124/78  BP Location: Right arm  Patient Position: Sitting  Height and Weight  Height: 5' 5" (165.1 cm)  Weight: (!) 142.6 kg (314 lb 6 oz)  BSA (Calculated - sq m): 2.56 sq meters  BMI (Calculated): 52.3  Weight in (lb) to have BMI = 25: 149.9    Physical Exam  Vitals and nursing note reviewed.   Constitutional:       General: She is not in acute distress.     Appearance: Normal appearance. She is well-developed. She is not ill-appearing.   HENT:      Head: Normocephalic and atraumatic.      Right Ear: External ear normal.      Left Ear: External ear normal.      Ears:      Comments:  Redness bilateral ear canals with clear effusion noted behind TMs     Mouth/Throat:      Mouth: Mucous membranes are moist.      Pharynx: Posterior oropharyngeal erythema present.   Eyes:      Conjunctiva/sclera: Conjunctivae normal.   Neck:      Thyroid: No thyromegaly.      Vascular: No JVD.      Trachea: No tracheal deviation.   Cardiovascular:      Rate and Rhythm: Normal rate and regular rhythm.      Heart sounds: Normal heart sounds. No murmur heard.  Pulmonary:      Effort: Pulmonary effort is normal.      Breath sounds: Normal breath sounds.      Comments:  Bronchial irritation noted  bilaterally  Chest:      Chest wall: No tenderness.   Abdominal:      Palpations: Abdomen is soft.      Tenderness: There is no abdominal tenderness. There is no guarding.   Musculoskeletal:         General: Normal range of motion.      Cervical back: Normal range of motion.   Lymphadenopathy:      Cervical: Cervical adenopathy present.   Skin:     General: Skin is warm and dry.   Neurological:      " "General: No focal deficit present.      Mental Status: She is alert and oriented to person, place, and time.   Psychiatric:         Mood and Affect: Mood normal.         Behavior: Behavior normal.         Thought Content: Thought content normal.         Judgment: Judgment normal.          Laboratory:  CBC:  Lab Results   Component Value Date    WBC 7.69 06/05/2025    RBC 5.43 (H) 06/05/2025    HGB 13.1 06/05/2025    HCT 43.0 06/05/2025     06/05/2025    MCV 79 (L) 06/05/2025    MCH 24.1 (L) 06/05/2025    MCHC 30.5 (L) 06/05/2025    MCHC 30.6 (L) 07/15/2024    MCHC 31.6 (L) 01/03/2024     CMP:  Lab Results   Component Value Date     (H) 06/05/2025    CALCIUM 10.3 06/05/2025    ALBUMIN 3.8 06/05/2025    PROT 7.8 06/05/2025     06/05/2025    K 4.3 06/05/2025    CO2 29 06/05/2025     06/05/2025    BUN 19 06/05/2025    ALKPHOS 88 06/05/2025    ALT 39 06/05/2025    AST 34 06/05/2025    BILITOT 0.5 06/05/2025    BILITOT 0.5 01/03/2024    BILITOT 0.7 02/22/2023     URINALYSIS:  No results found for: "COLORU", "CLARITYU", "SPECGRAV", "PHUR", "PROTEINUA", "GLUCOSEU", "BILIRUBINCON", "BLOODU", "WBCU", "RBCU", "BACTERIA", "MUCUS", "NITRITE", "LEUKOCYTESUR", "UROBILINOGEN", "HYALINECASTS"   LIPIDS:  Lab Results   Component Value Date    TSH 2.219 06/05/2025    TSH 2.018 09/10/2024    TSH 2.0 02/18/2006    HDL 57 06/05/2025    HDL 63 09/10/2024    HDL 66 07/05/2022    CHOL 153 06/05/2025    CHOL 160 09/10/2024    CHOL 180 07/05/2022    TRIG 132 06/05/2025    TRIG 169 (H) 09/10/2024    TRIG 151 (H) 07/05/2022    LDLCALC 69.6 06/05/2025    LDLCALC 63.2 09/10/2024    LDLCALC 90 07/05/2022    CHOLHDL 37.3 06/05/2025    CHOLHDL 39.4 09/10/2024    CHOLHDL 2.73 07/05/2022    NONHDLCHOL 96 06/05/2025    NONHDLCHOL 97 09/10/2024    TOTALCHOLEST 2.7 06/05/2025    TOTALCHOLEST 2.5 09/10/2024    TOTALCHOLEST 3.1 02/18/2006     TSH:  Lab Results   Component Value Date    TSH 2.219 06/05/2025    TSH 2.018 09/10/2024 "    TSH 2.0 02/18/2006     A1C:  Lab Results   Component Value Date    HGBA1C 6.6 (H) 06/05/2025    HGBA1C 6.0 (H) 02/22/2023    HGBA1C 5.8 (H) 07/05/2022    HGBA1C 5.6 03/22/2022    HGBA1C 5.7 07/24/2021         Assessment/Plan     Frances Cardoza is a 65 y.o.female with:    Assessment & Plan      ACUTE BRONCHITIS:  - Assessed respiratory symptoms including cough, chest tightness, and copious phlegm production.  - Discussed prevalence of coughing at work and need for radiographic evaluation.  - Ordered chest XR to confirm bronchitis diagnosis and rule out pneumonia.  - Initiated treatment with steroid pack to reduce inflammation, codeine-containing antitussive for symptomatic relief, Augmentin (amoxicillin/clavulanate) as empiric antimicrobial therapy, and Diflucan (fluconazole) prophylactically for potential candidiasis.    ACUTE PHARYNGITIS:  - Observed erythematous and painful throat with dysphagia even to water.  - Frances also reports ears keep popping, affecting hearing.  - Treatment includes steroid pack to reduce pharyngeal inflammation, codeine-containing antitussive for symptomatic relief, and Augmentin (amoxicillin/clavulanate) for empiric treatment.    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  - Frances self-treated with Biofreeze patches for analgesia during the trip.          Acute non-recurrent frontal sinusitis  -     amoxicillin-clavulanate 875-125mg (AUGMENTIN) 875-125 mg per tablet; Take 1 tablet by mouth every 12 (twelve) hours. for 7 days  Dispense: 14 tablet; Refill: 0  -     guaiFENesin-codeine 100-10 mg/5 ml (TUSSI-ORGANIDIN NR)  mg/5 mL syrup; Take 5 mLs by mouth 3 (three) times daily as needed for Cough.  Dispense: 140 mL; Refill: 0  -     methylPREDNISolone (MEDROL DOSEPACK) 4 mg tablet; use as directed  Dispense: 21 each; Refill: 0  -     X-Ray Chest PA And  Lateral; Future; Expected date: 07/15/2025    Acute cough  -     amoxicillin-clavulanate 875-125mg (AUGMENTIN) 875-125 mg per tablet; Take 1 tablet by mouth every 12 (twelve) hours. for 7 days  Dispense: 14 tablet; Refill: 0  -     guaiFENesin-codeine 100-10 mg/5 ml (TUSSI-ORGANIDIN NR)  mg/5 mL syrup; Take 5 mLs by mouth 3 (three) times daily as needed for Cough.  Dispense: 140 mL; Refill: 0  -     methylPREDNISolone (MEDROL DOSEPACK) 4 mg tablet; use as directed  Dispense: 21 each; Refill: 0  -     X-Ray Chest PA And Lateral; Future; Expected date: 07/15/2025    Bronchitis  -     amoxicillin-clavulanate 875-125mg (AUGMENTIN) 875-125 mg per tablet; Take 1 tablet by mouth every 12 (twelve) hours. for 7 days  Dispense: 14 tablet; Refill: 0  -     guaiFENesin-codeine 100-10 mg/5 ml (TUSSI-ORGANIDIN NR)  mg/5 mL syrup; Take 5 mLs by mouth 3 (three) times daily as needed for Cough.  Dispense: 140 mL; Refill: 0  -     methylPREDNISolone (MEDROL DOSEPACK) 4 mg tablet; use as directed  Dispense: 21 each; Refill: 0  -     X-Ray Chest PA And Lateral; Future; Expected date: 07/15/2025    Antibiotic-induced yeast infection  -     fluconazole (DIFLUCAN) 150 MG Tab; Take 1 tablet (150 mg total) by mouth once daily. for 1 day  Dispense: 1 tablet; Refill: 0          Health Maintenance Due   Topic Date Due    TETANUS VACCINE  Never done    Shingles Vaccine (1 of 2) Never done    RSV Vaccine (Age 60+ and Pregnant patients) (1 - Risk 60-74 years 1-dose series) Never done        I spent 38 minutes on the day of this encounter for preparing for, evaluating, treating, and managing this patient.      -Continue current medications and maintain follow up with specialists.  Return to clinic in Follow up if symptoms worsen or fail to improve.     This note was generated with the assistance of ambient listening technology. Verbal consent was obtained by the patient and accompanying visitor(s) for the recording of patient  appointment to facilitate this note. I attest to having reviewed and edited the generated note for accuracy, though some syntax or spelling errors may persist. Please contact the author of this note for any clarification.        VIKAS Rendon  [facility] location                       [1]   Social History  Tobacco Use    Smoking status: Never    Smokeless tobacco: Never   Substance Use Topics    Alcohol use: Yes     Comment: 1x day    Drug use: No   [2]   Outpatient Encounter Medications as of 7/15/2025   Medication Sig Dispense Refill    albuterol (VENTOLIN HFA) 90 mcg/actuation inhaler Inhale 2 puffs into the lungs every 6 (six) hours as needed for Wheezing. Rescue 18 g 6    aspirin (ECOTRIN) 81 MG EC tablet Take 1 tablet (81 mg total) by mouth once daily.      DULoxetine (CYMBALTA) 20 MG capsule Take 2 capsules (40 mg total) by mouth once daily. 180 capsule 0    ergocalciferol (ERGOCALCIFEROL) 50,000 unit Cap Take 1 capsule (50,000 Units total) by mouth every 7 days. 24 capsule 1    hydroCHLOROthiazide 12.5 MG Tab Take 1 tablet (12.5 mg total) by mouth once daily. 90 tablet 4    ibuprofen (ADVIL,MOTRIN) 600 MG tablet Take 1 tablet (600 mg total) by mouth every 8 (eight) hours as needed for Pain. 30 tablet 0    levothyroxine (SYNTHROID) 50 MCG tablet Take 1 tablet (50 mcg total) by mouth before breakfast. 90 tablet 1    metFORMIN (GLUCOPHAGE-XR) 500 MG ER 24hr tablet Take 1 tab by mouth in a.m. with breakfast x1 week, then increase to 1 tab twice daily with meals x1 week, then increase to 2 tabs twice daily with meals 360 tablet 3    nebivoloL (BYSTOLIC) 10 MG Tab Take 1 tablet (10 mg total) by mouth once daily. 90 tablet 3    pantoprazole (PROTONIX) 40 MG tablet Take 1 tablet (40 mg total) by mouth 2 (two) times daily. 180 tablet 1    rosuvastatin (CRESTOR) 40 MG Tab Take 1 tablet (40 mg total) by mouth every evening. 90 tablet 0    tirzepatide 5 mg/0.2 mL Syrg Inject 5 mg into the skin  once a week. 2 mL 3    amoxicillin-clavulanate 875-125mg (AUGMENTIN) 875-125 mg per tablet Take 1 tablet by mouth every 12 (twelve) hours. for 7 days 14 tablet 0    fluconazole (DIFLUCAN) 150 MG Tab Take 1 tablet (150 mg total) by mouth once daily. for 1 day 1 tablet 0    guaiFENesin-codeine 100-10 mg/5 ml (TUSSI-ORGANIDIN NR)  mg/5 mL syrup Take 5 mLs by mouth 3 (three) times daily as needed for Cough. 140 mL 0    methylPREDNISolone (MEDROL DOSEPACK) 4 mg tablet use as directed 21 each 0    nystatin (MYCOSTATIN) cream Apply topically 2 (two) times daily. for 7 days 30 g 1     No facility-administered encounter medications on file as of 7/15/2025.

## 2025-07-17 ENCOUNTER — RESULTS FOLLOW-UP (OUTPATIENT)
Dept: PRIMARY CARE CLINIC | Facility: CLINIC | Age: 65
End: 2025-07-17
Payer: COMMERCIAL

## 2025-07-18 DIAGNOSIS — M79.7 FIBROMYALGIA: ICD-10-CM

## 2025-07-18 DIAGNOSIS — E66.01 MORBID (SEVERE) OBESITY DUE TO EXCESS CALORIES: ICD-10-CM

## 2025-07-18 DIAGNOSIS — J45.30 MILD PERSISTENT ASTHMA WITHOUT COMPLICATION: ICD-10-CM

## 2025-07-18 DIAGNOSIS — E11.65 TYPE 2 DIABETES MELLITUS WITH HYPERGLYCEMIA, WITHOUT LONG-TERM CURRENT USE OF INSULIN: ICD-10-CM

## 2025-07-18 DIAGNOSIS — I65.29 CAROTID ATHEROSCLEROSIS, UNSPECIFIED LATERALITY: ICD-10-CM

## 2025-07-18 DIAGNOSIS — I10 HYPERTENSION, BENIGN: ICD-10-CM

## 2025-07-18 DIAGNOSIS — E78.2 MIXED HYPERLIPIDEMIA: ICD-10-CM

## 2025-07-18 RX ORDER — TIRZEPATIDE 5 MG/0.2ML
5 SYRINGE (ML) SUBCUTANEOUS WEEKLY
Qty: 2 ML | Refills: 3 | Status: SHIPPED | OUTPATIENT
Start: 2025-07-18

## 2025-07-18 RX ORDER — TIRZEPATIDE 5 MG/0.2ML
5 SYRINGE (ML) SUBCUTANEOUS WEEKLY
Qty: 2 ML | Refills: 3 | Status: SHIPPED | OUTPATIENT
Start: 2025-07-18 | End: 2025-07-18 | Stop reason: SDUPTHER

## 2025-07-19 ENCOUNTER — PATIENT MESSAGE (OUTPATIENT)
Dept: PRIMARY CARE CLINIC | Facility: CLINIC | Age: 65
End: 2025-07-19
Payer: COMMERCIAL

## 2025-07-19 DIAGNOSIS — E78.2 MIXED HYPERLIPIDEMIA: ICD-10-CM

## 2025-07-19 DIAGNOSIS — E66.01 MORBID (SEVERE) OBESITY DUE TO EXCESS CALORIES: ICD-10-CM

## 2025-07-19 DIAGNOSIS — I65.29 CAROTID ATHEROSCLEROSIS, UNSPECIFIED LATERALITY: ICD-10-CM

## 2025-07-19 DIAGNOSIS — M79.7 FIBROMYALGIA: ICD-10-CM

## 2025-07-19 DIAGNOSIS — I10 HYPERTENSION, BENIGN: ICD-10-CM

## 2025-07-19 DIAGNOSIS — J45.30 MILD PERSISTENT ASTHMA WITHOUT COMPLICATION: ICD-10-CM

## 2025-07-19 DIAGNOSIS — E11.65 TYPE 2 DIABETES MELLITUS WITH HYPERGLYCEMIA, WITHOUT LONG-TERM CURRENT USE OF INSULIN: ICD-10-CM

## 2025-07-21 NOTE — TELEPHONE ENCOUNTER
Pt medication request was sent as print instead of normal.     Med re-pended to be sent to pt pharmacy

## 2025-07-23 ENCOUNTER — PATIENT MESSAGE (OUTPATIENT)
Dept: PRIMARY CARE CLINIC | Facility: CLINIC | Age: 65
End: 2025-07-23
Payer: COMMERCIAL

## 2025-07-23 RX ORDER — TIRZEPATIDE 5 MG/0.2ML
5 SYRINGE (ML) SUBCUTANEOUS WEEKLY
Qty: 2 ML | Refills: 3 | Status: SHIPPED | OUTPATIENT
Start: 2025-07-23

## 2025-07-23 NOTE — TELEPHONE ENCOUNTER
LOV with Yanique Abad NP , 7/15/2025    Per chart:    tirzepatide  Awaiting authorization: this order is pended.  Inject 5 mg into the skin once a week., Starting Mon 7/21/2025, Normal   Dispense: 2 mL   Refills: 3 ordered   Pharmacy: [id number] [place] [id number] [address] AT [facility] [address] (Ph: [phone])     Med sent as print, needs to be changed

## 2025-08-30 DIAGNOSIS — F33.8 SEASONAL AFFECTIVE DISORDER: ICD-10-CM

## 2025-08-30 DIAGNOSIS — M79.7 FIBROMYALGIA: ICD-10-CM

## 2025-09-02 RX ORDER — DULOXETIN HYDROCHLORIDE 20 MG/1
CAPSULE, DELAYED RELEASE ORAL
Qty: 180 CAPSULE | Refills: 0 | Status: SHIPPED | OUTPATIENT
Start: 2025-09-02